# Patient Record
Sex: MALE | Race: WHITE | NOT HISPANIC OR LATINO | Employment: OTHER | URBAN - METROPOLITAN AREA
[De-identification: names, ages, dates, MRNs, and addresses within clinical notes are randomized per-mention and may not be internally consistent; named-entity substitution may affect disease eponyms.]

---

## 2017-05-23 LAB — HBA1C MFR BLD HPLC: 5.6 %

## 2018-02-16 RX ORDER — TAMSULOSIN HYDROCHLORIDE 0.4 MG/1
CAPSULE ORAL
COMMUNITY
End: 2018-03-13 | Stop reason: SDUPTHER

## 2018-02-16 RX ORDER — ACYCLOVIR 200 MG/1
CAPSULE ORAL
COMMUNITY
End: 2019-01-21

## 2018-02-16 RX ORDER — EMPAGLIFLOZIN 10 MG/1
TABLET, FILM COATED ORAL
Status: ON HOLD | COMMUNITY
End: 2018-11-16 | Stop reason: CLARIF

## 2018-02-16 RX ORDER — CHOLECALCIFEROL (VITAMIN D3) 125 MCG
TABLET ORAL
COMMUNITY
End: 2019-01-21

## 2018-02-16 RX ORDER — ALPRAZOLAM 2 MG/1
TABLET ORAL
COMMUNITY
End: 2021-04-14

## 2018-02-16 RX ORDER — CITALOPRAM 10 MG/1
TABLET ORAL
Status: ON HOLD | COMMUNITY
End: 2018-11-16 | Stop reason: CLARIF

## 2018-02-20 ENCOUNTER — OFFICE VISIT (OUTPATIENT)
Dept: UROLOGY | Facility: CLINIC | Age: 63
End: 2018-02-20
Payer: MEDICARE

## 2018-02-20 VITALS
SYSTOLIC BLOOD PRESSURE: 108 MMHG | DIASTOLIC BLOOD PRESSURE: 80 MMHG | WEIGHT: 209 LBS | HEART RATE: 74 BPM | BODY MASS INDEX: 30.96 KG/M2 | HEIGHT: 69 IN

## 2018-02-20 DIAGNOSIS — N52.9 VASCULOGENIC ERECTILE DYSFUNCTION, UNSPECIFIED VASCULOGENIC ERECTILE DYSFUNCTION TYPE: ICD-10-CM

## 2018-02-20 DIAGNOSIS — N40.1 BENIGN PROSTATIC HYPERPLASIA WITH LOWER URINARY TRACT SYMPTOMS, SYMPTOM DETAILS UNSPECIFIED: Primary | ICD-10-CM

## 2018-02-20 LAB
SL AMB  POCT GLUCOSE, UA: NORMAL
SL AMB LEUKOCYTE ESTERASE,UA: NORMAL
SL AMB POCT BILIRUBIN,UA: NORMAL
SL AMB POCT BLOOD,UA: NORMAL
SL AMB POCT CLARITY,UA: CLEAR
SL AMB POCT COLOR,UA: YELLOW
SL AMB POCT KETONES,UA: NORMAL
SL AMB POCT NITRITE,UA: NORMAL
SL AMB POCT PH,UA: 5
SL AMB POCT SPECIFIC GRAVITY,UA: 1.01
SL AMB POCT URINE PROTEIN: NORMAL
SL AMB POCT UROBILINOGEN: NORMAL

## 2018-02-20 PROCEDURE — 81002 URINALYSIS NONAUTO W/O SCOPE: CPT | Performed by: UROLOGY

## 2018-02-20 PROCEDURE — 51741 ELECTRO-UROFLOWMETRY FIRST: CPT | Performed by: UROLOGY

## 2018-02-20 PROCEDURE — 99214 OFFICE O/P EST MOD 30 MIN: CPT | Performed by: UROLOGY

## 2018-02-20 PROCEDURE — 51798 US URINE CAPACITY MEASURE: CPT | Performed by: UROLOGY

## 2018-02-20 RX ORDER — COLESEVELAM 180 1/1
1875 TABLET ORAL 2 TIMES DAILY WITH MEALS
Status: ON HOLD | COMMUNITY
End: 2018-11-16 | Stop reason: CLARIF

## 2018-02-20 RX ORDER — ASPIRIN 81 MG/1
81 TABLET ORAL DAILY
COMMUNITY

## 2018-02-20 RX ORDER — LANOLIN ALCOHOL/MO/W.PET/CERES
CREAM (GRAM) TOPICAL DAILY
COMMUNITY

## 2018-02-20 RX ORDER — MELATONIN
1000 DAILY
COMMUNITY

## 2018-02-20 RX ORDER — OMEPRAZOLE 20 MG/1
20 CAPSULE, DELAYED RELEASE ORAL DAILY
COMMUNITY
End: 2022-03-16 | Stop reason: DRUGHIGH

## 2018-02-20 RX ORDER — GLIMEPIRIDE 1 MG/1
1 TABLET ORAL
COMMUNITY

## 2018-02-20 RX ORDER — ROSUVASTATIN CALCIUM 5 MG/1
5 TABLET, COATED ORAL DAILY
COMMUNITY

## 2018-02-20 NOTE — LETTER
February 20, 2018     Codey Perry MD  [address] [place] [address]    Patient: Jesse Whipple   YOB: 1955   Date of Visit: 2/20/2018       Dear Dr Nuris Maddox: Thank you for referring Jesse Whipple to me for evaluation  Below are my notes for this consultation  If you have questions, please do not hesitate to call me  I look forward to following your patient along with you  Sincerely,        Taz Alford MD        CC: No Recipients  Taz Alford MD  2/20/2018  8:23 AM  Sign at close encounter  Uroflow Result    Indication:    BPH      Procedure  [facility]  There asked to void volitionally into the uroflow apparatus  The following findings were noted:    Findings:  Voided Volume:   60  Maximum flow (Qmax): For ml/s  Average flow:    2 4 ml/s  Description of emptying curve:   diminutive and flattened due to low emptied volume       Post Void Residual Measurement:  After voiding to completion the patient was brought to the exam room and positioned supine  Residual urine volume was measured with an ultrasound at: Thirty ml        Taz Alford MD  2/20/2018  8:22 AM  Sign at close encounter     Diagnoses and all orders for this visit:    Benign prostatic hyperplasia with lower urinary tract symptoms, symptom details unspecified    Vasculogenic erectile dysfunction, unspecified vasculogenic erectile dysfunction type    Other orders  -     acyclovir (ZOVIRAX) 200 mg capsule; Take by mouth  -     ALPRAZolam (XANAX) 2 MG tablet; Take by mouth  -     citalopram (CeleXA) 10 mg tablet; Take by mouth  -     tamsulosin (FLOMAX) 0 4 mg; Take by mouth  -     sitaGLIPtin-metFORMIN (JANUMET)  MG per tablet; Take by mouth  -     Empagliflozin (JARDIANCE) 10 MG TABS; Take by mouth  -     choline fenofibrate (TRILIPIX) 135 MG capsule;  Take by mouth  -     Ergocalciferol (VITAMIN D2) 2000 units TABS; Take by mouth            Assessment and plan:     BPH doing well on tamsulosin    Erectile dysfunction, worsening      Today, we had a long and candid discussion about the multifactorial etiology of erectile dysfunction and the stepwise approach to treatment  I discussed the advantages and disadvantages of the standard treatments for erectile dysfunction including phosphodiesterase inhibitors, intracavernosal injections, urethral suppositories, vacuum erection devices, and penile implants  The patient is interested in trying oral pharmacotherapy to start  I discussed the potential side affects of these medications including, but not limited to flushing, headaches, visual changes, nasal congestion, interactions with other medications, prolonged erections, and failure to achieve an erection  He was warned never to take nitroglycerin in conjunction with medications for erectile dysfunction  I also recommended that he stagger when he takes alpha-blockers for BPH and phosphodiesterase inhibitors  If the patient fails multiple trials of the maximum strength dose, we will further discuss alternative treatment options  All of his questions were answered today and he understands the logic of this approach  I have renewed his tamsulosin  Also prescribed Viagra 100 mg and instructed to start with half tab dosing  We will see him back in 4 months time for symptom reassessment    Raymond Navarrete MD      Chief Complaint     No chief complaint on file  History of Present Illness     Vanesa Momin is a 63y o  year old male with BPH and obstructive lower urinary tract symptoms  He returns for follow-up after more than 1 year  In the interim he has had a heart attack  Overall however he is doing well  His urinary symptoms remain well controlled with tamsulosin monotherapy  He has had worsening erectile dysfunction over the past few months      Detailed Urologic History     - please refer to HPI    Review of Systems     Review of Systems   Constitutional: Negative for activity change and fatigue  HENT: Negative for congestion  Eyes: Negative for visual disturbance  Respiratory: Negative for shortness of breath and wheezing  Cardiovascular: Negative for chest pain and leg swelling  Gastrointestinal: Negative for abdominal pain  Endocrine: Positive for polyuria  Genitourinary: Negative for dysuria, flank pain, hematuria and urgency  Musculoskeletal: Negative for back pain  Allergic/Immunologic: Negative for immunocompromised state  Neurological: Negative for dizziness and numbness  Psychiatric/Behavioral: Negative for dysphoric mood  All other systems reviewed and are negative  Allergies     Allergies   Allergen Reactions    Choline Fenofibrate      Other reaction(s): jaundice    Iodine Solution  [Povidone Iodine]     Shellfish Allergy     Statins        Physical Exam     Physical Exam   Constitutional: He is oriented to person, place, and time  He appears well-developed and well-nourished  No distress  HENT:   Head: Normocephalic and atraumatic  Eyes: EOM are normal    Neck: Normal range of motion  Cardiovascular:   Negative lower extremity edema   Pulmonary/Chest: Effort normal and breath sounds normal    Abdominal: Soft  Genitourinary:   Genitourinary Comments: Negative suprapubic tenderness   Musculoskeletal: Normal range of motion  Neurological: He is alert and oriented to person, place, and time  Skin: Skin is warm  Psychiatric: He has a normal mood and affect  His behavior is normal            Vital Signs  There were no vitals filed for this visit        Current Medications       Current Outpatient Prescriptions:     acyclovir (ZOVIRAX) 200 mg capsule, Take by mouth, Disp: , Rfl:     ALPRAZolam (XANAX) 2 MG tablet, Take by mouth, Disp: , Rfl:     choline fenofibrate (TRILIPIX) 135 MG capsule, Take by mouth, Disp: , Rfl:    citalopram (CeleXA) 10 mg tablet, Take by mouth, Disp: , Rfl:     Empagliflozin (JARDIANCE) 10 MG TABS, Take by mouth, Disp: , Rfl:     Ergocalciferol (VITAMIN D2) 2000 units TABS, Take by mouth, Disp: , Rfl:     sitaGLIPtin-metFORMIN (JANUMET)  MG per tablet, Take by mouth, Disp: , Rfl:     tamsulosin (FLOMAX) 0 4 mg, Take by mouth, Disp: , Rfl:       Active Problems     Patient Active Problem List   Diagnosis    Benign prostatic hyperplasia    ED (erectile dysfunction)         Past Medical History     Past Medical History:   Diagnosis Date    Anxiety     Back problem     [facility]     History of depression     Multiple myeloma ([facility] )          Surgical History     No past surgical history on file        Family History     Family History   Problem Relation Age of Onset    Heart disease Father     Diabetes Brother          Social History     Social History     History   Smoking Status    Former Smoker   Smokeless Tobacco    Not on file         Pertinent Lab Values     No results found for: CREATININE    No results found for: PSA    @RESULTRCNT(1H])@      Pertinent Imaging      - n/a

## 2018-02-20 NOTE — PROGRESS NOTES
Diagnoses and all orders for this visit:    Benign prostatic hyperplasia with lower urinary tract symptoms, symptom details unspecified    Vasculogenic erectile dysfunction, unspecified vasculogenic erectile dysfunction type    Other orders  -     acyclovir (ZOVIRAX) 200 mg capsule; Take by mouth  -     ALPRAZolam (XANAX) 2 MG tablet; Take by mouth  -     citalopram (CeleXA) 10 mg tablet; Take by mouth  -     tamsulosin (FLOMAX) 0 4 mg; Take by mouth  -     sitaGLIPtin-metFORMIN (JANUMET)  MG per tablet; Take by mouth  -     Empagliflozin (JARDIANCE) 10 MG TABS; Take by mouth  -     choline fenofibrate (TRILIPIX) 135 MG capsule; Take by mouth  -     Ergocalciferol (VITAMIN D2) 2000 units TABS; Take by mouth            Assessment and plan:     BPH doing well on tamsulosin    Erectile dysfunction, worsening      Today, we had a long and candid discussion about the multifactorial etiology of erectile dysfunction and the stepwise approach to treatment  I discussed the advantages and disadvantages of the standard treatments for erectile dysfunction including phosphodiesterase inhibitors, intracavernosal injections, urethral suppositories, vacuum erection devices, and penile implants  The patient is interested in trying oral pharmacotherapy to start  I discussed the potential side affects of these medications including, but not limited to flushing, headaches, visual changes, nasal congestion, interactions with other medications, prolonged erections, and failure to achieve an erection  He was warned never to take nitroglycerin in conjunction with medications for erectile dysfunction  I also recommended that he stagger when he takes alpha-blockers for BPH and phosphodiesterase inhibitors  If the patient fails multiple trials of the maximum strength dose, we will further discuss alternative treatment options  All of his questions were answered today and he understands the logic of this approach  I have renewed his tamsulosin  Also prescribed Viagra 100 mg and instructed to start with half tab dosing  We will see him back in 4 months time for symptom reassessment    Verner Squires, MD      Chief Complaint     No chief complaint on file  History of Present Illness     Cena Leventhal is a 63y o  year old male with BPH and obstructive lower urinary tract symptoms  He returns for follow-up after more than 1 year  In the interim he has had a heart attack  Overall however he is doing well  His urinary symptoms remain well controlled with tamsulosin monotherapy  He has had worsening erectile dysfunction over the past few months  Detailed Urologic History     - please refer to HPI    Review of Systems     Review of Systems   Constitutional: Negative for activity change and fatigue  HENT: Negative for congestion  Eyes: Negative for visual disturbance  Respiratory: Negative for shortness of breath and wheezing  Cardiovascular: Negative for chest pain and leg swelling  Gastrointestinal: Negative for abdominal pain  Endocrine: Positive for polyuria  Genitourinary: Negative for dysuria, flank pain, hematuria and urgency  Musculoskeletal: Negative for back pain  Allergic/Immunologic: Negative for immunocompromised state  Neurological: Negative for dizziness and numbness  Psychiatric/Behavioral: Negative for dysphoric mood  All other systems reviewed and are negative  Allergies     Allergies   Allergen Reactions    Choline Fenofibrate      Other reaction(s): jaundice    Iodine Solution  [Povidone Iodine]     Shellfish Allergy     Statins        Physical Exam     Physical Exam   Constitutional: He is oriented to person, place, and time  He appears well-developed and well-nourished  No distress  HENT:   Head: Normocephalic and atraumatic  Eyes: EOM are normal    Neck: Normal range of motion     Cardiovascular:   Negative lower extremity edema Pulmonary/Chest: Effort normal and breath sounds normal    Abdominal: Soft  Genitourinary:   Genitourinary Comments: Negative suprapubic tenderness   Musculoskeletal: Normal range of motion  Neurological: He is alert and oriented to person, place, and time  Skin: Skin is warm  Psychiatric: He has a normal mood and affect  His behavior is normal            Vital Signs  There were no vitals filed for this visit  Current Medications       Current Outpatient Prescriptions:     acyclovir (ZOVIRAX) 200 mg capsule, Take by mouth, Disp: , Rfl:     ALPRAZolam (XANAX) 2 MG tablet, Take by mouth, Disp: , Rfl:     choline fenofibrate (TRILIPIX) 135 MG capsule, Take by mouth, Disp: , Rfl:     citalopram (CeleXA) 10 mg tablet, Take by mouth, Disp: , Rfl:     Empagliflozin (JARDIANCE) 10 MG TABS, Take by mouth, Disp: , Rfl:     Ergocalciferol (VITAMIN D2) 2000 units TABS, Take by mouth, Disp: , Rfl:     sitaGLIPtin-metFORMIN (JANUMET)  MG per tablet, Take by mouth, Disp: , Rfl:     tamsulosin (FLOMAX) 0 4 mg, Take by mouth, Disp: , Rfl:       Active Problems     Patient Active Problem List   Diagnosis    Benign prostatic hyperplasia    ED (erectile dysfunction)         Past Medical History     Past Medical History:   Diagnosis Date    Anxiety     Back problem     [facility]     History of depression     [facility] myeloma ([facility] )          Surgical History     No past surgical history on file        Family History     Family History   Problem Relation Age of Onset    Heart disease Father     Diabetes Brother          Social History     Social History     History   Smoking Status    Former Smoker   Smokeless Tobacco    Not on file         Pertinent Lab Values     No results found for: CREATININE    No results found for: PSA    @RESULTRCNT(1H])@      Pertinent Imaging      - n/a

## 2018-02-20 NOTE — PATIENT INSTRUCTIONS
BLADDER HEALTH    WHAT IS CONSIDERED NORMAL?  The average bladder can hold about 2 cups of urine before it needs to be emptied   The normal range of voiding urine is 6 to 8 times during a 24 hour period  As we get older, our bladder capacity can get smaller and we may need to pass urine more frequently but usually not more than every 2 hours   Urine should flow easily without discomfort in a good, steady stream until the bladder is empty  No pushing or straining is necessary to empty the bladder   An urge is a signal that you feel as the bladder stretches to fill with urine  Urges can be felt even if the bladder is not full  Urges are not commands to go to the toilet, merely a signal and can be controlled  WHAT ARE GOOD BLADDER HABITS?  Take your time when emptying your bladder  Dont strain or push to empty your bladder  Make sure you empty your bladder completely each time you pass urine  Do not rush the process   Consistently ignoring the urge to go (waiting more than 4 hours between toileting) or urinating too infrequently may be convenient but not healthy for your bladder   Avoid going to the toilet just in case or more often than every 2 hours  It is usually not necessary to go when you feel the first urge  Try to go only when your bladder is full  Urgency and frequency of urination can be improved by retraining the bladder and spacing your fluid intake throughout the day  Practice good toilet habits  Dont let your bladder control your life  TIPS TO MAINTAIN GOOD BLADDER HABITS   Maintain a good fluid intake  Depending on your body size and environment, drink 6 -8 cups (8 oz each) of fluid per day unless otherwise advised by your doctor  Not enough fluid creates a foul odor and dark color of the urine     Limit the amount of caffeine (coffee, cola, chocolate or tea) and citrus foods that you consume as these foods can be associated with increased sensation of urinary urgency and frequency   Limit the amount of alcohol you drink  Alcohol increases urine production and makes it difficult for the brain to coordinate bladder control   Avoid constipation by maintaining a balanced diet of dietary fiber   Cigarette smoking is also irritating to the bladder surface and is associated with bladder cancer  In addition, the coughing associated with smoking may lead to increased incontinent episodes because of the increased pressure  HOW DIET CAN AFFECT YOUR BLADDER  Although there is no particular "diet" that can cure bladder control, there are certain dietary suggestions you can use to help control the problem  There are 2 points to consider when evaluating how your habits and diet may affect your bladder:    1  Foods and fluids can irritate the bladder  Some foods and beverages are thought to contribute to bladder leakage and irritability  However their effect on the bladder is not completely understood and you may want to see if eliminating one or all of these items improves your bladder control  If you are unable to give them up completely, it is recommended that you use the following items in moderation:   Acidic beverages and foods (orange juice, grapefruit juice, lemonade etc)   Alcoholic beverages   Vinegar   Coffee (regular and decaf)   Tea (regular and decaf)   Caffeinated beverages   Carbonated beverages          2  Drinking enough and the right kinds of fluids  Many people with bladder control issues decrease their intake of liquids in hope that they will need to urinate less frequently or have less urinary leakage   You should not restrict fluids to control your bladder  While a decrease in liquid intake does result in a decrease in the volume of urine, the smaller amount of urine may be more highly concentrated         Highly concentrated, dark yellow urine is irritating to the bladder surface and may actually cause you to go to the bathroom more frequently   It also encourages the growth of bacteria, which may lead to infections resulting in incontinence   Substitutions for Bladder Irritants: water is always the best beverage choice    Grape and apple juice are thirst quenchers are good selections and are not as irritating to the bladder   o Low acid fruits:  Pears, apricots, papaya, watermelon  o For coffee drinkers: KAVA®, Postum®, Kevin®, Kaffree Yaritza®  o For tea drinkers:  non-citrus or herbal and sun brewed tea

## 2018-02-20 NOTE — PROGRESS NOTES
Uroflow Result    Indication:    BPH      Procedure  The patient was brought ambulatory to the commode and instructions provided  There asked to void volitionally into the uroflow apparatus  The following findings were noted:    Findings:  Voided Volume:   60  Maximum flow (Qmax): For ml/s  Average flow:    2 4 ml/s  Description of emptying curve:   diminutive and flattened due to low emptied volume       Post Void Residual Measurement:  After voiding to completion the patient was brought to the exam room and positioned supine  Residual urine volume was measured with an ultrasound at:     Thirty ml

## 2018-03-13 DIAGNOSIS — N40.0 BENIGN PROSTATIC HYPERPLASIA, UNSPECIFIED WHETHER LOWER URINARY TRACT SYMPTOMS PRESENT: Primary | ICD-10-CM

## 2018-03-13 DIAGNOSIS — N52.9 ERECTILE DYSFUNCTION, UNSPECIFIED ERECTILE DYSFUNCTION TYPE: ICD-10-CM

## 2018-03-13 RX ORDER — TAMSULOSIN HYDROCHLORIDE 0.4 MG/1
0.4 CAPSULE ORAL
Qty: 30 CAPSULE | Refills: 6 | Status: SHIPPED | OUTPATIENT
Start: 2018-03-13 | End: 2018-06-21 | Stop reason: SDUPTHER

## 2018-03-13 RX ORDER — SILDENAFIL 50 MG/1
50 TABLET, FILM COATED ORAL DAILY PRN
Qty: 10 TABLET | Refills: 6 | Status: SHIPPED | OUTPATIENT
Start: 2018-03-13 | End: 2019-11-19 | Stop reason: DRUGHIGH

## 2018-03-13 NOTE — TELEPHONE ENCOUNTER
[facility] patient,  Spoke to patient, recently seen in follow up on 2/20/18 with Dr Flavia Villegas, patient states his prescriptions for Flomax and Viagra were not sent to [facility] on [address]  Advised patient will send message to advanced practitioner and request refills be sent  Patient requests we call him once scripts are sent  Call back number [phone]

## 2018-06-21 ENCOUNTER — OFFICE VISIT (OUTPATIENT)
Dept: UROLOGY | Facility: CLINIC | Age: 63
End: 2018-06-21
Payer: MEDICARE

## 2018-06-21 VITALS
WEIGHT: 215 LBS | DIASTOLIC BLOOD PRESSURE: 80 MMHG | BODY MASS INDEX: 31.84 KG/M2 | HEART RATE: 74 BPM | SYSTOLIC BLOOD PRESSURE: 114 MMHG | HEIGHT: 69 IN

## 2018-06-21 DIAGNOSIS — Z12.5 PROSTATE CANCER SCREENING: Primary | ICD-10-CM

## 2018-06-21 DIAGNOSIS — N40.0 BENIGN PROSTATIC HYPERPLASIA, UNSPECIFIED WHETHER LOWER URINARY TRACT SYMPTOMS PRESENT: ICD-10-CM

## 2018-06-21 PROCEDURE — 99213 OFFICE O/P EST LOW 20 MIN: CPT | Performed by: PHYSICIAN ASSISTANT

## 2018-06-21 RX ORDER — TAMSULOSIN HYDROCHLORIDE 0.4 MG/1
0.4 CAPSULE ORAL
Qty: 90 CAPSULE | Refills: 3 | Status: SHIPPED | OUTPATIENT
Start: 2018-06-21 | End: 2019-05-23 | Stop reason: SDUPTHER

## 2018-06-21 RX ORDER — ACYCLOVIR 400 MG/1
400 TABLET ORAL DAILY
Refills: 5 | COMMUNITY
Start: 2018-06-09

## 2018-06-21 NOTE — PROGRESS NOTES
1  Prostate cancer screening  PSA Total, Diagnostic   2  Benign prostatic hyperplasia, unspecified whether lower urinary tract symptoms present  tamsulosin (FLOMAX) 0 4 mg         Assessment and plan:       1  BPH with lower urinary tract symptoms -managed by Dr Felipe Husain  - symptoms adequately managed on tamsulosin daily at this time  - refills provided    2  Erectile dysfunction  - patient is having mild improvement with Viagra 50 mg  He has not tried maximum dose management however yet  Patient denies any side effects from the medication     -we discussed other treatment options including daily Cialis, intraurethral suppositories, intracavernosal injections, vacuum assist devices, and penile prostheses  -we discussed trying maximum strength Viagra 100 mg  This will be in the form of Sildenfil 20 mg 5 tablets as needed since Viagra was cost prohibitive   -side effect profile reviewed  Patient is aware that he can never take this medication in conjunction with nitroglycerin  -patient will follow up in the next 2-3 months with a PSA prior to visit for re-evaluation  Felicia Garcia PA-C      Chief Complaint     Chief Complaint   Patient presents with    Benign Prostatic Hypertrophy         History of Present Illness     Nuzhat Rea is a 63 y o  male patient of Dr Jamilah Ortiz with a history of BPH with obstructive lower urinary tract symptoms presenting for follow-up  Patient is BPH with lower urinary tract symptoms have been managed on tamsulosin daily  N/C and post micturition dribbling  He denies any urinary incontinence, gross hematuria, dysuria, suprapubic pressure  He denies any urinary tract infections  Patient at his last appointment had been noting worsening erectile function  He was initiated on Viagra 100 mg was instructed to did start with a half tab dose  Patient had taken Viagra 50 mg without any side effects    He states he is able to only achieve a partial erection with this medication, however this is better than without medication where he is unable to achieve erection at all  Patient states that Viagra 50 mg was cost prohibitive as well  Patient denies any family history of prostate cancer    Laboratory       Review of Systems     Review of Systems   Constitutional: Negative for activity change, appetite change, chills, diaphoresis, fatigue, fever and unexpected weight change  Respiratory: Negative for chest tightness and shortness of breath  Cardiovascular: Negative for chest pain, palpitations and leg swelling  Gastrointestinal: Negative for abdominal distention, abdominal pain, constipation, diarrhea, nausea and vomiting  Genitourinary: Negative for decreased urine volume, difficulty urinating, dysuria, enuresis, flank pain, frequency, genital sores, hematuria and urgency  Musculoskeletal: Negative for back pain, gait problem and myalgias  Skin: Negative for color change, pallor, rash and wound  Psychiatric/Behavioral: Negative for behavioral problems  The patient is not nervous/anxious  Allergies     Allergies   Allergen Reactions    Iodinated Diagnostic Agents Anaphylaxis    Iodine Anaphylaxis    Atorvastatin      Other reaction(s): Other (See Comments)    Choline Fenofibrate      Other reaction(s): jaundice  Other reaction(s): jaundice    Iodine Solution  [Povidone Iodine]     Shellfish Allergy     Statins        Physical Exam     Physical Exam   Constitutional: He is oriented to person, place, and time  He appears well-developed and well-nourished  No distress  HENT:   Head: Normocephalic and atraumatic  Eyes: Conjunctivae are normal    Neck: Normal range of motion  No tracheal deviation present  Pulmonary/Chest: Effort normal    Musculoskeletal: Normal range of motion  He exhibits no edema or deformity  Neurological: He is alert and oriented to person, place, and time  Skin: Skin is warm and dry  No rash noted   He is not diaphoretic  No erythema  Psychiatric: He has a normal mood and affect   His behavior is normal          Vital Signs     Vitals:    06/21/18 0939   BP: 114/80   BP Location: Left arm   Patient Position: Sitting   Cuff Size: Adult   Pulse: 74   Weight: 97 5 kg (215 lb)   Height: 5' 9" (1 753 m)         Current Medications       Current Outpatient Prescriptions:     acyclovir (ZOVIRAX) 200 mg capsule, Take by mouth, Disp: , Rfl:     ALPRAZolam (XANAX) 2 MG tablet, Take by mouth, Disp: , Rfl:     aspirin (ECOTRIN LOW STRENGTH) 81 mg EC tablet, Take 81 mg by mouth daily, Disp: , Rfl:     cholecalciferol (VITAMIN D3) 1,000 units tablet, Take 1,000 Units by mouth daily, Disp: , Rfl:     choline fenofibrate (TRILIPIX) 135 MG capsule, Take by mouth, Disp: , Rfl:     citalopram (CeleXA) 10 mg tablet, Take by mouth, Disp: , Rfl:     colesevelam (WELCHOL) 625 mg tablet, Take 1,875 mg by mouth 2 (two) times a day with meals, Disp: , Rfl:     cyanocobalamin (VITAMIN B-12) 1,000 mcg tablet, Take by mouth daily, Disp: , Rfl:     Empagliflozin (JARDIANCE) 10 MG TABS, Take by mouth, Disp: , Rfl:     Ergocalciferol (VITAMIN D2) 2000 units TABS, Take by mouth, Disp: , Rfl:     glimepiride (AMARYL) 1 mg tablet, Take 1 mg by mouth every morning before breakfast, Disp: , Rfl:     metoprolol tartrate (LOPRESSOR) 25 mg tablet, Take 25 mg by mouth every 12 (twelve) hours, Disp: , Rfl:     omeprazole (PriLOSEC) 20 mg delayed release capsule, Take 20 mg by mouth daily, Disp: , Rfl:     rosuvastatin (CRESTOR) 5 mg tablet, Take 5 mg by mouth daily, Disp: , Rfl:     sildenafil (VIAGRA) 50 MG tablet, Take 1 tablet (50 mg total) by mouth daily as needed for erectile dysfunction, Disp: 10 tablet, Rfl: 6    sitaGLIPtin-metFORMIN (JANUMET)  MG per tablet, Take by mouth, Disp: , Rfl:     tamsulosin (FLOMAX) 0 4 mg, Take 1 capsule (0 4 mg total) by mouth daily with dinner, Disp: 90 capsule, Rfl: 3    acyclovir (ZOVIRAX) 400 MG tablet, Take 400 mg by mouth daily, Disp: , Rfl: 5      Active Problems     Patient Active Problem List   Diagnosis    Benign prostatic hyperplasia    ED (erectile dysfunction)         Past Medical History     Past Medical History:   Diagnosis Date    Anxiety     Back problem     [facility] ([facility] )     History of depression     Multiple myeloma ([facility] )          Surgical History     History reviewed  No pertinent surgical history        Family History     Family History   Problem Relation Age of Onset    Heart disease Father     Diabetes Brother          Social History     Social History       Radiology

## 2018-08-09 ENCOUNTER — TELEPHONE (OUTPATIENT)
Dept: GASTROENTEROLOGY | Facility: CLINIC | Age: 63
End: 2018-08-09

## 2018-08-14 ENCOUNTER — TELEPHONE (OUTPATIENT)
Dept: GASTROENTEROLOGY | Facility: CLINIC | Age: 63
End: 2018-08-14

## 2018-10-17 RX ORDER — TRAZODONE HYDROCHLORIDE 50 MG/1
50 TABLET ORAL
Refills: 1 | COMMUNITY
Start: 2018-08-28 | End: 2021-05-25

## 2018-10-17 RX ORDER — LOSARTAN POTASSIUM 25 MG/1
25 TABLET ORAL DAILY
Refills: 1 | COMMUNITY
Start: 2018-09-09 | End: 2022-03-16 | Stop reason: DRUGHIGH

## 2018-10-17 RX ORDER — KETOCONAZOLE 20 MG/G
CREAM TOPICAL
Refills: 2 | COMMUNITY
Start: 2018-08-01 | End: 2020-10-06

## 2018-10-17 RX ORDER — CHLORHEXIDINE GLUCONATE 0.12 MG/ML
RINSE ORAL
Refills: 2 | COMMUNITY
Start: 2018-07-31 | End: 2021-04-14

## 2018-10-17 RX ORDER — BUPROPION HYDROCHLORIDE 100 MG/1
300 TABLET, EXTENDED RELEASE ORAL DAILY
Refills: 4 | COMMUNITY
Start: 2018-10-10 | End: 2019-01-21

## 2018-10-18 ENCOUNTER — OFFICE VISIT (OUTPATIENT)
Dept: GASTROENTEROLOGY | Facility: CLINIC | Age: 63
End: 2018-10-18
Payer: MEDICARE

## 2018-10-18 VITALS
HEART RATE: 72 BPM | WEIGHT: 214 LBS | BODY MASS INDEX: 31.6 KG/M2 | DIASTOLIC BLOOD PRESSURE: 74 MMHG | SYSTOLIC BLOOD PRESSURE: 130 MMHG

## 2018-10-18 DIAGNOSIS — K59.1 FUNCTIONAL DIARRHEA: ICD-10-CM

## 2018-10-18 DIAGNOSIS — Z12.11 SCREENING FOR COLON CANCER: Primary | ICD-10-CM

## 2018-10-18 PROBLEM — R19.7 DIARRHEA: Status: ACTIVE | Noted: 2018-10-18

## 2018-10-18 PROCEDURE — 99204 OFFICE O/P NEW MOD 45 MIN: CPT | Performed by: INTERNAL MEDICINE

## 2018-10-18 RX ORDER — ZOLEDRONIC ACID 4 MG/100ML
4 INJECTION, SOLUTION, CONCENTRATE INTRAVENOUS ONCE
COMMUNITY
End: 2021-04-14

## 2018-10-18 RX ORDER — LENALIDOMIDE 5 MG/1
5 CAPSULE ORAL DAILY
COMMUNITY

## 2018-10-18 NOTE — PROGRESS NOTES
Consultation - [facility]  Markiah Sauls 1955 66 y o  male     ASSESSMENT @ PLAN:   He is a 61-year-old male that needs a colonoscopy for colon cancer screening  He has multiple myeloma and is in remission  He is having some diarrhea secondary to his Revlimid therapy  Will do colonoscopy to investigate    Will recommend high-fiber diet    He will be checking his platelet count before the colonoscopy to make sure that it is above 50,000    Chief Complaint:   Diarrhea and colon cancer screening    HPI:   He is a 61-year-old male here for colon cancer screening and diarrhea  He is having some diarrhea from the Revlimid therapy  He has no melena hematochezia  He needs a colonoscopy  He is in remission for multiple myeloma for about a year  He has no fevers chills nausea vomiting  Nobody in the family has Crohn's disease or ulcerative colitis  His last colonoscopy was in 2013 he had benign polyps  REVIEW OF SYSTEMS:     CONSTITUTIONAL: Denies any fever, chills, or rigors  Good appetite, and no recent weight loss  HEENT: No earache or tinnitus  Denies hearing loss or visual disturbances  CARDIOVASCULAR: No chest pain or palpitations  RESPIRATORY: Denies any cough, hemoptysis, shortness of breath or dyspnea on exertion  GASTROINTESTINAL: As noted in the History of Present Illness  GENITOURINARY: No problems with urination  Denies any hematuria or dysuria  NEUROLOGIC: No dizziness or vertigo, denies headaches  MUSCULOSKELETAL: Denies any muscle or joint pain  SKIN: Denies skin rashes or itching  ENDOCRINE: Denies excessive thirst  Denies intolerance to heat or cold  PSYCHOSOCIAL: Denies depression or anxiety  Denies any recent memory loss       Past Medical History:   Diagnosis Date    Anxiety     Back problem     BPH (benign prostatic hyperplasia)     [facility]     History of depression     Multiple myeloma (HCC)     [facility] )       Past Surgical History:   Procedure Laterality Date    HERNIA REPAIR      TONSILLECTOMY       Social History     Social History    Marital status: /Civil Union     Spouse name: N/A    Number of children: N/A    Years of education: N/A     Occupational History    Not on file  Social History Main Topics    Smoking status: Former Smoker    Smokeless tobacco: Never Used    Alcohol use No    Drug use: No    Sexual activity: Not on file     Other Topics Concern    Not on file     Social History Narrative    No narrative on file     Family History   Problem Relation Age of Onset    Heart disease Father     Diabetes Brother      Iodinated diagnostic agents; Iodine; Trilipix [choline fenofibrate]; Atorvastatin; Iodine solution [povidone iodine];  Shellfish allergy; and Statins  Current Outpatient Prescriptions   Medication Sig Dispense Refill    acyclovir (ZOVIRAX) 400 MG tablet Take 400 mg by mouth daily  5    ALPRAZolam (XANAX) 2 MG tablet Take by mouth      aspirin (ECOTRIN LOW STRENGTH) 81 mg EC tablet Take 81 mg by mouth daily      cholecalciferol (VITAMIN D3) 1,000 units tablet Take 1,000 Units by mouth daily      glimepiride (AMARYL) 1 mg tablet Take 1 mg by mouth every morning before breakfast      ketoconazole (NIZORAL) 2 % cream APPLY DAILY BETWEEN ALL TOES  2    lenalidomide (REVLIMID) 5 MG CAPS Take 5 mg by mouth daily      losartan (COZAAR) 25 mg tablet Take 25 mg by mouth daily  1    metoprolol tartrate (LOPRESSOR) 25 mg tablet Take 25 mg by mouth every 12 (twelve) hours      omeprazole (PriLOSEC) 20 mg delayed release capsule Take 20 mg by mouth daily      rosuvastatin (CRESTOR) 5 mg tablet Take 5 mg by mouth daily      sildenafil (VIAGRA) 50 MG tablet Take 1 tablet (50 mg total) by mouth daily as needed for erectile dysfunction 10 tablet 6    sitaGLIPtin-metFORMIN (JANUMET)  MG per tablet Take by mouth      tamsulosin (FLOMAX) 0 4 mg Take 1 capsule (0 4 mg total) by mouth daily with dinner 90 capsule 3    traZODone (DESYREL) 50 mg tablet 50 mg daily at bedtime as needed for sleep  1    zoledronic acid (ZOMETA) 4 mg/100 mL Infuse 4 mg into a venous catheter once      acyclovir (ZOVIRAX) 200 mg capsule Take by mouth      buPROPion (WELLBUTRIN SR) 100 mg 12 hr tablet Take 300 mg by mouth daily  4    chlorhexidine (PERIDEX) 0 12 % solution RINSE FOR 60 SECONDS AND EXPECTORATE TWICE DAILY  2    choline fenofibrate (TRILIPIX) 135 MG capsule Take by mouth      citalopram (CeleXA) 10 mg tablet Take by mouth      colesevelam (WELCHOL) 625 mg tablet Take 1,875 mg by mouth 2 (two) times a day with meals      cyanocobalamin (VITAMIN B-12) 1,000 mcg tablet Take by mouth daily      Empagliflozin (JARDIANCE) 10 MG TABS Take by mouth      Ergocalciferol (VITAMIN D2) 2000 units TABS Take by mouth       No current facility-administered medications for this visit  Blood pressure 130/74, pulse 72, weight 97 1 kg (214 lb)  PHYSICAL EXAM:     General Appearance:   Alert, cooperative, no distress, appears stated age    HEENT:   Normocephalic, atraumatic, anicteric      Neck:  Supple, symmetrical, trachea midline, no adenopathy;    thyroid: no enlargement/tenderness/nodules; no carotid  bruit or JVD    Lungs:   Clear to auscultation bilaterally; no rales, rhonchi or wheezing; respirations unlabored    Heart[de-identified]   S1 and S2 normal; regular rate and rhythm; no murmur, rub, or gallop     Abdomen:   Soft, non-tender, non-distended; normal bowel sounds; no masses, no organomegaly    Genitalia:   Deferred    Rectal:   Deferred    Extremities:  No cyanosis, clubbing or edema    Pulses:  2+ and symmetric all extremities    Skin:  Skin color, texture, turgor normal, no rashes or lesions    Lymph nodes:  No palpable cervical, axillary or inguinal lymphadenopathy        No results found for: WBC, HGB, HCT, MCV, PLT  No results found for: GLUCOSE, CALCIUM, NA, K, CO2, CL, BUN, CREATININE  No results found for: ALT, AST, GGT, ALKPHOS, BILITOT  No results found for: INR, PROTIME

## 2018-11-14 ENCOUNTER — TELEPHONE (OUTPATIENT)
Dept: UROLOGY | Facility: CLINIC | Age: 63
End: 2018-11-14

## 2018-11-15 ENCOUNTER — ANESTHESIA EVENT (OUTPATIENT)
Dept: PERIOP | Facility: HOSPITAL | Age: 63
End: 2018-11-15
Payer: MEDICARE

## 2018-11-15 ENCOUNTER — OFFICE VISIT (OUTPATIENT)
Dept: UROLOGY | Facility: CLINIC | Age: 63
End: 2018-11-15
Payer: MEDICARE

## 2018-11-15 VITALS
BODY MASS INDEX: 32.17 KG/M2 | DIASTOLIC BLOOD PRESSURE: 70 MMHG | HEIGHT: 69 IN | HEART RATE: 64 BPM | SYSTOLIC BLOOD PRESSURE: 124 MMHG | WEIGHT: 217.2 LBS

## 2018-11-15 DIAGNOSIS — Z12.5 PROSTATE CANCER SCREENING: Primary | ICD-10-CM

## 2018-11-15 DIAGNOSIS — N52.9 ERECTILE DYSFUNCTION, UNSPECIFIED ERECTILE DYSFUNCTION TYPE: ICD-10-CM

## 2018-11-15 PROCEDURE — 99213 OFFICE O/P EST LOW 20 MIN: CPT | Performed by: PHYSICIAN ASSISTANT

## 2018-11-15 RX ORDER — SILDENAFIL CITRATE 20 MG/1
TABLET ORAL
Qty: 50 TABLET | Refills: 6 | Status: ON HOLD | OUTPATIENT
Start: 2018-11-15 | End: 2018-11-16 | Stop reason: CLARIF

## 2018-11-15 NOTE — PROGRESS NOTES
1  Prostate cancer screening  PSA, Total Screen   2  Erectile dysfunction, unspecified erectile dysfunction type  sildenafil (REVATIO) 20 mg tablet     Assessment and plan:       1  BPH with lower urinary tract symptoms -managed by Dr Farrah aZidi  - continue tamsulosin monotherapy    2  Erectile dysfunction  - unable to achieve fully rigid erection with sildenafil 100 mg   - we discussed further intervention with vacuum assisted device, intraurethral suppositories, and intracavernosal injections  Patient does not wish to pursue any of these modalities because he feels like it removed the spontaneity   -  I encouraged patient to increase cardiovascular exercise and have weight loss  - he will continue with sildenafil 100 mg as needed  3  Routine prostate cancer screening  - PSA within normal limits of 0 35  F/u 1 year PSA prior to visit    Christofer Carlson PA-C      Chief Complaint     Chief Complaint   Patient presents with    Benign Prostatic Hypertrophy    prostate cancer screening     PSA=0 35(10/29/2018)       History of Present Illness     Ramiro Moody is a 66 y o   male patient of Dr Farrah Zaidi a history of BPH with obstructive lower urinary tract symptoms presenting for follow-up    Patient continues to take tamsulosin monotherapy  He is overall comfortable with urination  Feels like he has a fair stream, feels empty after urination, has nocturia 2-3 times nightly  Denies any dysuria, gross hematuria, suprapubic pressure, or urinary infections  Patient has had complaints of erectile dysfunction  He most recently had been utilizing sildenafil 100 mg  He states that he was able to achieve approximately a 70% rigidity of the erection, however did not feel that this was sufficient enough for penetration  He denied any adverse reactions to the medication  Patient had a recent PSA is 0 35 (10/29/2018)        Review of Systems     Review of Systems   Constitutional: Negative for activity change, appetite change, chills, diaphoresis, fatigue, fever and unexpected weight change  Respiratory: Negative for chest tightness and shortness of breath  Cardiovascular: Negative for chest pain, palpitations and leg swelling  Gastrointestinal: Negative for abdominal distention, abdominal pain, constipation, diarrhea, nausea and vomiting  Genitourinary: Negative for decreased urine volume, difficulty urinating, dysuria, enuresis, flank pain, frequency, genital sores, hematuria and urgency  Musculoskeletal: Negative for back pain, gait problem and myalgias  Skin: Negative for color change, pallor, rash and wound  Psychiatric/Behavioral: Negative for behavioral problems  The patient is not nervous/anxious  Allergies     Allergies   Allergen Reactions    Iodinated Diagnostic Agents Anaphylaxis    Iodine Anaphylaxis    Trilipix [Choline Fenofibrate]      Other reaction(s): jaundice  Other reaction(s): jaundice    Atorvastatin     Iodine Solution [Povidone Iodine]     Shellfish Allergy     Statins        Physical Exam     Physical Exam   Constitutional: He appears well-developed and well-nourished  No distress  HENT:   Head: Normocephalic and atraumatic  Eyes: Conjunctivae are normal    Neck: Normal range of motion  No tracheal deviation present  Pulmonary/Chest: Effort normal    Musculoskeletal: Normal range of motion  He exhibits no edema or deformity  Neurological: He is alert  Skin: Skin is warm and dry  No rash noted  He is not diaphoretic  No erythema  Psychiatric: He has a normal mood and affect   His behavior is normal          Vital Signs     Vitals:    11/15/18 1103   BP: 124/70   Pulse: 64   Weight: 98 5 kg (217 lb 3 2 oz)   Height: 5' 9" (1 753 m)         Current Medications       Current Outpatient Prescriptions:     acyclovir (ZOVIRAX) 200 mg capsule, Take by mouth, Disp: , Rfl:     acyclovir (ZOVIRAX) 400 MG tablet, Take 400 mg by mouth daily, Disp: , Rfl: 5    ALPRAZolam (XANAX) 2 MG tablet, Take by mouth, Disp: , Rfl:     aspirin (ECOTRIN LOW STRENGTH) 81 mg EC tablet, Take 81 mg by mouth daily, Disp: , Rfl:     buPROPion (WELLBUTRIN SR) 100 mg 12 hr tablet, Take 300 mg by mouth daily, Disp: , Rfl: 4    cholecalciferol (VITAMIN D3) 1,000 units tablet, Take 1,000 Units by mouth daily, Disp: , Rfl:     colesevelam (WELCHOL) 625 mg tablet, Take 1,875 mg by mouth 2 (two) times a day with meals, Disp: , Rfl:     cyanocobalamin (VITAMIN B-12) 1,000 mcg tablet, Take by mouth daily, Disp: , Rfl:     Empagliflozin (JARDIANCE) 10 MG TABS, Take by mouth, Disp: , Rfl:     Ergocalciferol (VITAMIN D2) 2000 units TABS, Take by mouth, Disp: , Rfl:     glimepiride (AMARYL) 1 mg tablet, Take 1 mg by mouth every morning before breakfast, Disp: , Rfl:     ketoconazole (NIZORAL) 2 % cream, APPLY DAILY BETWEEN ALL TOES, Disp: , Rfl: 2    lenalidomide (REVLIMID) 5 MG CAPS, Take 5 mg by mouth daily, Disp: , Rfl:     losartan (COZAAR) 25 mg tablet, Take 25 mg by mouth daily, Disp: , Rfl: 1    metoprolol tartrate (LOPRESSOR) 25 mg tablet, Take 25 mg by mouth every 12 (twelve) hours, Disp: , Rfl:     omeprazole (PriLOSEC) 20 mg delayed release capsule, Take 20 mg by mouth daily, Disp: , Rfl:     rosuvastatin (CRESTOR) 5 mg tablet, Take 5 mg by mouth daily, Disp: , Rfl:     sildenafil (VIAGRA) 50 MG tablet, Take 1 tablet (50 mg total) by mouth daily as needed for erectile dysfunction, Disp: 10 tablet, Rfl: 6    sitaGLIPtin-metFORMIN (JANUMET)  MG per tablet, Take by mouth, Disp: , Rfl:     tamsulosin (FLOMAX) 0 4 mg, Take 1 capsule (0 4 mg total) by mouth daily with dinner, Disp: 90 capsule, Rfl: 3    traZODone (DESYREL) 50 mg tablet, 50 mg daily at bedtime as needed for sleep, Disp: , Rfl: 1    zoledronic acid (ZOMETA) 4 mg/100 mL, Infuse 4 mg into a venous catheter once, Disp: , Rfl:     chlorhexidine (PERIDEX) 0 12 % solution, RINSE FOR 60 SECONDS AND EXPECTORATE TWICE DAILY, Disp: , Rfl: 2    choline fenofibrate (TRILIPIX) 135 MG capsule, Take by mouth, Disp: , Rfl:     citalopram (CeleXA) 10 mg tablet, Take by mouth, Disp: , Rfl:     sildenafil (REVATIO) 20 mg tablet, Please take 5 tablet PO PRN erectile dysufnction, Disp: 50 tablet, Rfl: 6      Active Problems     Patient Active Problem List   Diagnosis    Benign prostatic hyperplasia    ED (erectile dysfunction)    Screening for colon cancer    Diarrhea       Past Medical History     Past Medical History:   Diagnosis Date    Anxiety     Back problem     BPH (benign prostatic hyperplasia)     [facility]     History of depression     [facility]     [facility] myeloma ([facility]          Surgical History     Past Surgical History:   Procedure Laterality Date    HERNIA REPAIR      TONSILLECTOMY         Family History     Family History   Problem Relation Age of Onset    Heart disease Father     Diabetes Brother        Social History     Social History       Radiology

## 2018-11-16 ENCOUNTER — ANESTHESIA (OUTPATIENT)
Dept: PERIOP | Facility: HOSPITAL | Age: 63
End: 2018-11-16
Payer: MEDICARE

## 2018-11-16 ENCOUNTER — HOSPITAL ENCOUNTER (OUTPATIENT)
Facility: HOSPITAL | Age: 63
Setting detail: OUTPATIENT SURGERY
Discharge: HOME | End: 2018-11-16
Attending: INTERNAL MEDICINE | Admitting: INTERNAL MEDICINE
Payer: MEDICARE

## 2018-11-16 VITALS
SYSTOLIC BLOOD PRESSURE: 108 MMHG | HEIGHT: 69 IN | WEIGHT: 211.42 LBS | BODY MASS INDEX: 31.31 KG/M2 | TEMPERATURE: 98.5 F | DIASTOLIC BLOOD PRESSURE: 52 MMHG | OXYGEN SATURATION: 97 % | RESPIRATION RATE: 18 BRPM | HEART RATE: 68 BPM

## 2018-11-16 LAB — GLUCOSE SERPL-MCNC: 167 MG/DL (ref 65–140)

## 2018-11-16 PROCEDURE — G0105 COLORECTAL SCRN; HI RISK IND: HCPCS | Performed by: INTERNAL MEDICINE

## 2018-11-16 PROCEDURE — 82948 REAGENT STRIP/BLOOD GLUCOSE: CPT

## 2018-11-16 RX ORDER — SODIUM CHLORIDE, SODIUM LACTATE, POTASSIUM CHLORIDE, CALCIUM CHLORIDE 600; 310; 30; 20 MG/100ML; MG/100ML; MG/100ML; MG/100ML
50 INJECTION, SOLUTION INTRAVENOUS CONTINUOUS
Status: DISCONTINUED | OUTPATIENT
Start: 2018-11-16 | End: 2018-11-16 | Stop reason: HOSPADM

## 2018-11-16 RX ORDER — SODIUM CHLORIDE, SODIUM LACTATE, POTASSIUM CHLORIDE, CALCIUM CHLORIDE 600; 310; 30; 20 MG/100ML; MG/100ML; MG/100ML; MG/100ML
INJECTION, SOLUTION INTRAVENOUS CONTINUOUS PRN
Status: DISCONTINUED | OUTPATIENT
Start: 2018-11-16 | End: 2018-11-16 | Stop reason: SURG

## 2018-11-16 RX ORDER — PROPOFOL 10 MG/ML
INJECTION, EMULSION INTRAVENOUS AS NEEDED
Status: DISCONTINUED | OUTPATIENT
Start: 2018-11-16 | End: 2018-11-16 | Stop reason: SURG

## 2018-11-16 RX ORDER — SODIUM CHLORIDE, SODIUM LACTATE, POTASSIUM CHLORIDE, CALCIUM CHLORIDE 600; 310; 30; 20 MG/100ML; MG/100ML; MG/100ML; MG/100ML
125 INJECTION, SOLUTION INTRAVENOUS CONTINUOUS
Status: DISCONTINUED | OUTPATIENT
Start: 2018-11-16 | End: 2018-11-16 | Stop reason: HOSPADM

## 2018-11-16 RX ADMIN — PROPOFOL 30 MG: 10 INJECTION, EMULSION INTRAVENOUS at 07:34

## 2018-11-16 RX ADMIN — PROPOFOL 30 MG: 10 INJECTION, EMULSION INTRAVENOUS at 07:31

## 2018-11-16 RX ADMIN — SODIUM CHLORIDE, SODIUM LACTATE, POTASSIUM CHLORIDE, AND CALCIUM CHLORIDE: .6; .31; .03; .02 INJECTION, SOLUTION INTRAVENOUS at 07:26

## 2018-11-16 RX ADMIN — PROPOFOL 90 MG: 10 INJECTION, EMULSION INTRAVENOUS at 07:28

## 2018-11-16 NOTE — ANESTHESIA POSTPROCEDURE EVALUATION
Post-Op Assessment Note      CV Status:  Stable    Mental Status:  Alert and awake    Hydration Status:  Euvolemic    PONV Controlled:  Controlled    Airway Patency:  Patent    Post Op Vitals Reviewed: Yes          Staff: CRNA, Anesthesiologist           BP      Temp     Pulse     Resp      SpO2

## 2018-11-16 NOTE — ANESTHESIA PREPROCEDURE EVALUATION
Review of Systems/Medical History  Patient summary reviewed  Chart reviewed      Cardiovascular  EKG reviewed, Hyperlipidemia, Hypertension , Past MI > 6 months, Cardiac stents > 6 months    Pulmonary  Shortness of breath,        GI/Hepatic            Endo/Other  Diabetes well controlled type 2 Oral agent,   Comment:  this AM    GYN       Hematology   Musculoskeletal       Neurology   Psychology   Anxiety,              Physical Exam    Airway    Mallampati score: III  TM Distance: >3 FB  Neck ROM: full     Dental   No notable dental hx     Cardiovascular  Rhythm: regular, Rate: normal, Cardiovascular exam normal    Pulmonary  Pulmonary exam normal Breath sounds clear to auscultation,     Other Findings        Anesthesia Plan  ASA Score- 2     Anesthesia Type- IV sedation with anesthesia with ASA Monitors  Additional Monitors:   Airway Plan:         Plan Factors-    Induction- intravenous  Postoperative Plan- Plan for postoperative opioid use  Informed Consent- Anesthetic plan and risks discussed with patient and spouse  I personally reviewed this patient with the CRNA  Discussed and agreed on the Anesthesia Plan with the CRNA  Karrie Nissen

## 2018-11-16 NOTE — DISCHARGE INSTRUCTIONS
Colonoscopy   WHAT YOU NEED TO KNOW:   A colonoscopy is a procedure to examine the inside of your colon (intestine) with a scope  Polyps or tissue growths may have been removed during your colonoscopy  It is normal to feel bloated and to have some abdominal discomfort  You should be passing gas  If you have hemorrhoids or you had polyps removed, you may have a small amount of bleeding  DISCHARGE INSTRUCTIONS:   Seek care immediately if:   · You have a large amount of bright red blood in your bowel movements  · Your abdomen is hard and firm and you have severe pain  · You have sudden trouble breathing  Contact your healthcare provider if:   · You develop a rash or hives  · You have a fever within 24 hours of your procedure  · You have not had a bowel movement for 3 days after your procedure  · You have questions or concerns about your condition or care  Activity:   · Do not lift, strain, or run  for 3 days after your procedure  · Rest after your procedure  You have been given medicine to relax you  Do not  drive or make important decisions until the day after your procedure  Return to your normal activity as directed  · Relieve gas and discomfort from bloating  by lying on your right side with a heating pad on your abdomen  You may need to take short walks to help the gas move out  Eat small meals until bloating is relieved  If you had polyps removed: For 7 days after your procedure:  · Do not  take aspirin  · Do not  go on long car rides  Help prevent constipation:   · Eat a variety of healthy foods  Healthy foods include fruit, vegetables, whole-grain breads, low-fat dairy products, beans, lean meat, and fish  Ask if you need to be on a special diet  Your healthcare provider may recommend that you eat high-fiber foods such as cooked beans  Fiber helps you have regular bowel movements  · Drink liquids as directed    Adults should drink between 9 and 13 eight-ounce cups of liquid every day  Ask what amount is best for you  For most people, good liquids to drink are water, juice, and milk  · Exercise as directed  Talk to your healthcare provider about the best exercise plan for you  Exercise can help prevent constipation, decrease your blood pressure and improve your health  Follow up with your healthcare provider as directed:  Write down your questions so you remember to ask them during your visits  © 2017 [employer] Information is for End User's use only and may not be sold, redistributed or otherwise used for commercial purposes  All illustrations and images included in CareNotes® are the copyrighted property of [employer]  or [employer]  The above information is an  only  It is not intended as medical advice for individual conditions or treatments  Talk to your doctor, nurse or pharmacist before following any medical regimen to see if it is safe and effective for you

## 2018-11-16 NOTE — DISCHARGE INSTR - AVS FIRST PAGE
COLONOSCOPY    PROCEDURE: Colonoscopy    INDICATIONS: History of Colon Polyps; last exam in 2012    POST-OP DIAGNOSIS: See the impression below    SEDATION: Monitored anesthesia care, check anesthesia records    PHYSICAL EXAM:  Vitals:    11/16/18 0642   BP: 122/64   Pulse: 66   Resp: 12   Temp: (!) 97 2 °F (36 2 °C)   SpO2: 97%     Body mass index is 31 22 kg/m²  General: NAD  Heart: S1 & S2 normal, RRR  Lungs: CTA, No rales or rhonchi  Abdomen: Soft, nontender, nondistended, good bowel sounds    CONSENT:  Informed consent was obtained for the procedure, including sedation after explaining the risks and benefits of the procedure  Risks including but not limited to bleeding, perforation, infection, aspiration were discussed in detail  Also explained about less than 100%$ sensitivity with the exam and other alternatives  PREPARATION:   EKG tracing, pulse oximetry, blood pressure were monitored throughout the procedure  Patient was identified by myself both verbally and by visual inspection of ID band  DESCRIPTION:   Patient was placed in the left lateral decubitus position and was sedated with the above medication  Digital rectal examination was performed  The colonoscope was introduced in to the anal canal and advanced up to cecum, which was identified by the appendiceal orifice and IC valve  A careful inspection was made as the colonoscope was withdrawn, including a retroflexed view of the rectum; findings and interventions are described below  Appropriate photodocumentation was obtained  The quality of the colonic preparation was excellent  The blood loss was minimal     FINDINGS:  There was diverticulosis noted in the cecum ascending colon hepatic flexure splenic flexure descending colon and sigmoid colon    The transverse colon and rectum were normal          IMPRESSIONS:    Pandiverticulosis    RECOMMENDATIONS:  High-fiber diet  Repeat colonoscopy in 5 years    COMPLICATIONS:  None; patient tolerated the procedure well    DISPOSITION: PACU  CONDITION: Stable    Tai Garcia MD  11/16/2018,7:38 AM

## 2019-01-21 ENCOUNTER — OFFICE VISIT (OUTPATIENT)
Dept: INTERNAL MEDICINE CLINIC | Facility: CLINIC | Age: 64
End: 2019-01-21
Payer: MEDICARE

## 2019-01-21 VITALS
SYSTOLIC BLOOD PRESSURE: 124 MMHG | DIASTOLIC BLOOD PRESSURE: 78 MMHG | RESPIRATION RATE: 14 BRPM | WEIGHT: 214.6 LBS | BODY MASS INDEX: 31.78 KG/M2 | HEIGHT: 69 IN | HEART RATE: 72 BPM

## 2019-01-21 DIAGNOSIS — F33.42 RECURRENT MAJOR DEPRESSIVE DISORDER, IN FULL REMISSION (HCC): ICD-10-CM

## 2019-01-21 DIAGNOSIS — I10 ESSENTIAL HYPERTENSION: Primary | ICD-10-CM

## 2019-01-21 DIAGNOSIS — N40.1 BENIGN PROSTATIC HYPERPLASIA WITH LOWER URINARY TRACT SYMPTOMS, SYMPTOM DETAILS UNSPECIFIED: ICD-10-CM

## 2019-01-21 DIAGNOSIS — C90.01 MULTIPLE MYELOMA IN REMISSION (HCC): ICD-10-CM

## 2019-01-21 DIAGNOSIS — E11.9 TYPE 2 DIABETES MELLITUS WITHOUT COMPLICATION, WITHOUT LONG-TERM CURRENT USE OF INSULIN (HCC): ICD-10-CM

## 2019-01-21 PROBLEM — C90.00 MULTIPLE MYELOMA (HCC): Status: ACTIVE | Noted: 2019-01-21

## 2019-01-21 PROBLEM — I21.9 MYOCARDIAL INFARCTION (HCC): Status: RESOLVED | Noted: 2017-12-30 | Resolved: 2019-01-21

## 2019-01-21 PROCEDURE — 99204 OFFICE O/P NEW MOD 45 MIN: CPT | Performed by: INTERNAL MEDICINE

## 2019-01-21 RX ORDER — NITROGLYCERIN 0.4 MG/1
TABLET SUBLINGUAL
Refills: 0 | COMMUNITY
Start: 2019-01-18

## 2019-01-21 RX ORDER — BUPROPION HYDROCHLORIDE 150 MG/1
450 TABLET ORAL DAILY
Refills: 4 | COMMUNITY
Start: 2019-01-14 | End: 2021-04-14

## 2019-01-21 RX ORDER — ALPRAZOLAM 1 MG/1
1 TABLET ORAL 2 TIMES DAILY PRN
Refills: 0 | COMMUNITY
Start: 2018-12-07 | End: 2022-03-15

## 2019-01-21 RX ORDER — SITAGLIPTIN AND METFORMIN HYDROCHLORIDE 1000; 50 MG/1; MG/1
2 TABLET, FILM COATED, EXTENDED RELEASE ORAL EVERY EVENING
Refills: 2 | COMMUNITY
Start: 2018-12-22 | End: 2022-03-16 | Stop reason: DRUGHIGH

## 2019-01-21 NOTE — PROGRESS NOTES
Assessment/Plan:       Diagnoses and all orders for this visit:    Essential hypertension    Benign prostatic hyperplasia with lower urinary tract symptoms, symptom details unspecified    Multiple myeloma in remission ([facility] )    Recurrent major depressive disorder, in full remission ([facility] )    Type 2 diabetes mellitus without complication, without long-term current use of insulin ([facility] )    Other orders  -     ALPRAZolam (XANAX) 1 mg tablet; 1 mg 2 (two) times a day as needed  -     buPROPion (WELLBUTRIN XL) 150 mg 24 hr tablet; Take 450 mg by mouth daily    -     nitroglycerin (NITROSTAT) 0 4 mg SL tablet; PLACE 1 TABLET UNDER TONGUE EVERY 5 MINS 3 TIMES IF NEEDED FOR CHEST PAIN  -     JANUMET XR  MG TB24; Take 2 tablets by mouth every evening          Patient Instructions   Chronic diagnoses noted above were treated and stable  Multiple specialty follow-up so noted  Laboratory data reviewed from October; no abnormalities that need to be addressed  See primary care again yearly  See me at the time of your birthday  Call if problems develop  Subjective:      Patient ID: Irina Tristan is a 66 y o  male  A 37-year-old male who has multiple health problems but was treated by multiple specialists  Multiple myeloma in remission followed at [facility], on [address]   Currently taking acyclovir for prevention of shingles as a part of this process  Major depression treated with bupropion  Diabetic followed by [facility]  Last A1c reported to be less than 6  BPH followed by Dr Gatito Yanes  Coronary artery disease  Myocardial infarction in December 2017  Treated at [facility][1] with stenting  Currently on medication and followed by Dr Ness Yang    Only surgical history is tonsillectomy and right inguinal hernia  Occasional alcohol, no drugs no smoking  A retiree from [employer]    At this moment he feels well  No symptoms reported          The following portions of the patient's history were reviewed and updated as appropriate:   He has a past medical history of Back problem; Diabetes ([facility] ); History of depression; Hyperlipidemia; Hypertension; Multiple myeloma ([facility] ); and Myocardial infarction ([facility] )  ,   does not have any pertinent problems on file  ,   has a past surgical history that includes Hernia repair; TONSILLECTOMY; Cardiac catheterization; Tonsillectomy; and pr colonoscopy flx dx w/collj spec when pfrmd (N/A, 11/16/2018)  ,  family history includes Diabetes in his brother; Heart disease in his father  ,   reports that he has never smoked  He has never used smokeless tobacco  He reports that he drinks alcohol  He reports that he does not use drugs  ,  is allergic to iodinated diagnostic agents; iodine; trilipix [choline fenofibrate]; atorvastatin; iodine solution [povidone iodine]; shellfish allergy; and statins     Current Outpatient Prescriptions   Medication Sig Dispense Refill    acyclovir (ZOVIRAX) 400 MG tablet Take 400 mg by mouth daily  5    aspirin (ECOTRIN LOW STRENGTH) 81 mg EC tablet Take 81 mg by mouth daily      buPROPion (WELLBUTRIN XL) 150 mg 24 hr tablet Take 450 mg by mouth daily    4    cholecalciferol (VITAMIN D3) 1,000 units tablet Take 1,000 Units by mouth daily      cyanocobalamin (VITAMIN B-12) 1,000 mcg tablet Take by mouth daily      glimepiride (AMARYL) 1 mg tablet Take 1 mg by mouth every morning before breakfast      JANUMET XR  MG TB24 Take 2 tablets by mouth every evening  2    ketoconazole (NIZORAL) 2 % cream BETWEEN ALL TOES as needed  2    lenalidomide (REVLIMID) 5 MG CAPS Take 5 mg by mouth daily      losartan (COZAAR) 25 mg tablet Take 25 mg by mouth daily  1    metoprolol tartrate (LOPRESSOR) 25 mg tablet Take 25 mg by mouth every 12 (twelve) hours      nitroglycerin (NITROSTAT) 0 4 mg SL tablet PLACE 1 TABLET UNDER TONGUE EVERY 5 MINS 3 TIMES IF NEEDED FOR CHEST PAIN  0    omeprazole (PriLOSEC) 20 mg delayed release capsule Take 20 mg by mouth daily      rosuvastatin (CRESTOR) 5 mg tablet Take 5 mg by mouth daily      sildenafil (VIAGRA) 50 MG tablet Take 1 tablet (50 mg total) by mouth daily as needed for erectile dysfunction 10 tablet 6    tamsulosin (FLOMAX) 0 4 mg Take 1 capsule (0 4 mg total) by mouth daily with dinner 90 capsule 3    traZODone (DESYREL) 50 mg tablet 50 mg daily at bedtime as needed for sleep  1    zoledronic acid (ZOMETA) 4 mg/100 mL Infuse 4 mg into a venous catheter once      ALPRAZolam (XANAX) 1 mg tablet 1 mg 2 (two) times a day as needed  0    ALPRAZolam (XANAX) 2 MG tablet Take by mouth      chlorhexidine (PERIDEX) 0 12 % solution RINSE FOR 60 SECONDS AND EXPECTORATE TWICE DAILY  2     No current facility-administered medications for this visit  Review of Systems   Constitutional: Negative for chills and fever  HENT: Negative for sore throat and trouble swallowing  Eyes: Negative for pain  Respiratory: Negative for cough and shortness of breath  Cardiovascular: Negative for chest pain and palpitations  Gastrointestinal: Negative for abdominal pain, blood in stool, diarrhea, nausea and vomiting  Endocrine: Negative for cold intolerance and heat intolerance  Genitourinary: Negative for dysuria, frequency and testicular pain  Musculoskeletal: Negative for joint swelling  Allergic/Immunologic: Negative for immunocompromised state  Neurological: Negative for dizziness, syncope and headaches  Hematological: Negative for adenopathy  Does not bruise/bleed easily  Psychiatric/Behavioral: Negative for dysphoric mood  The patient is not nervous/anxious  Objective:  Vitals:    01/21/19 0918   BP: 124/78   Pulse: 72   Resp: 14      Physical Exam   Constitutional: He is oriented to person, place, and time  He appears well-developed and well-nourished  No distress     An alert moderately overweight male patient who appears to be stated age   HENT:   Head: Normocephalic and atraumatic  Eyes: Pupils are equal, round, and reactive to light  Neck: Normal range of motion  Neck supple  No tracheal deviation present  No thyromegaly present  Cardiovascular: Normal rate, regular rhythm and normal heart sounds  Exam reveals no gallop  No murmur heard  Pulmonary/Chest: Effort normal  No respiratory distress  He has no wheezes  He has no rales  Abdominal: Soft  Bowel sounds are normal  There is no tenderness  Musculoskeletal: Normal range of motion  He exhibits no tenderness or deformity  Neurological: He is alert and oriented to person, place, and time  He has normal reflexes  Coordination normal    Skin: Skin is warm and dry  Psychiatric: He has a normal mood and affect

## 2019-01-21 NOTE — PATIENT INSTRUCTIONS
Chronic diagnoses noted above were treated and stable  Multiple specialty follow-up so noted  Laboratory data reviewed from October; no abnormalities that need to be addressed  See primary care again yearly  See me at the time of your birthday  Call if problems develop

## 2019-03-11 LAB — HBA1C MFR BLD HPLC: 6.3 %

## 2019-05-06 ENCOUNTER — TELEPHONE (OUTPATIENT)
Dept: INTERNAL MEDICINE CLINIC | Facility: CLINIC | Age: 64
End: 2019-05-06

## 2019-05-23 DIAGNOSIS — N40.0 BENIGN PROSTATIC HYPERPLASIA, UNSPECIFIED WHETHER LOWER URINARY TRACT SYMPTOMS PRESENT: ICD-10-CM

## 2019-05-23 RX ORDER — TAMSULOSIN HYDROCHLORIDE 0.4 MG/1
0.8 CAPSULE ORAL
Qty: 90 CAPSULE | Refills: 3 | Status: SHIPPED | OUTPATIENT
Start: 2019-05-23 | End: 2019-11-19 | Stop reason: SDUPTHER

## 2019-06-11 ENCOUNTER — TRANSCRIBE ORDERS (OUTPATIENT)
Dept: LAB | Facility: CLINIC | Age: 64
End: 2019-06-11

## 2019-06-11 ENCOUNTER — APPOINTMENT (OUTPATIENT)
Dept: LAB | Facility: CLINIC | Age: 64
End: 2019-06-11
Payer: MEDICARE

## 2019-06-11 DIAGNOSIS — C90.00 MYELOMA ASSOCIATED AMYLOIDOSIS (HCC): ICD-10-CM

## 2019-06-11 DIAGNOSIS — E85.9 MYELOMA ASSOCIATED AMYLOIDOSIS (HCC): ICD-10-CM

## 2019-06-11 DIAGNOSIS — E85.9 MYELOMA ASSOCIATED AMYLOIDOSIS (HCC): Primary | ICD-10-CM

## 2019-06-11 DIAGNOSIS — C90.00 MYELOMA ASSOCIATED AMYLOIDOSIS (HCC): Primary | ICD-10-CM

## 2019-06-11 LAB
BASOPHILS # BLD AUTO: 0.03 THOUSANDS/ΜL (ref 0–0.1)
BASOPHILS NFR BLD AUTO: 1 % (ref 0–1)
EOSINOPHIL # BLD AUTO: 0.38 THOUSAND/ΜL (ref 0–0.61)
EOSINOPHIL NFR BLD AUTO: 12 % (ref 0–6)
ERYTHROCYTE [DISTWIDTH] IN BLOOD BY AUTOMATED COUNT: 19.4 % (ref 11.6–15.1)
HCT VFR BLD AUTO: 30.1 % (ref 36.5–49.3)
HGB BLD-MCNC: 9.4 G/DL (ref 12–17)
IMM GRANULOCYTES # BLD AUTO: 0.01 THOUSAND/UL (ref 0–0.2)
IMM GRANULOCYTES NFR BLD AUTO: 0 % (ref 0–2)
LYMPHOCYTES # BLD AUTO: 0.96 THOUSANDS/ΜL (ref 0.6–4.47)
LYMPHOCYTES NFR BLD AUTO: 30 % (ref 14–44)
MCH RBC QN AUTO: 24.9 PG (ref 26.8–34.3)
MCHC RBC AUTO-ENTMCNC: 31.2 G/DL (ref 31.4–37.4)
MCV RBC AUTO: 80 FL (ref 82–98)
MONOCYTES # BLD AUTO: 0.27 THOUSAND/ΜL (ref 0.17–1.22)
MONOCYTES NFR BLD AUTO: 9 % (ref 4–12)
NEUTROPHILS # BLD AUTO: 1.51 THOUSANDS/ΜL (ref 1.85–7.62)
NEUTS SEG NFR BLD AUTO: 48 % (ref 43–75)
NRBC BLD AUTO-RTO: 1 /100 WBCS
RBC # BLD AUTO: 3.77 MILLION/UL (ref 3.88–5.62)
WBC # BLD AUTO: 3.16 THOUSAND/UL (ref 4.31–10.16)

## 2019-06-11 PROCEDURE — 36415 COLL VENOUS BLD VENIPUNCTURE: CPT

## 2019-06-11 PROCEDURE — 85025 COMPLETE CBC W/AUTO DIFF WBC: CPT

## 2019-09-04 ENCOUNTER — APPOINTMENT (OUTPATIENT)
Dept: LAB | Facility: CLINIC | Age: 64
End: 2019-09-04
Payer: MEDICARE

## 2019-09-04 ENCOUNTER — TRANSCRIBE ORDERS (OUTPATIENT)
Dept: ADMINISTRATIVE | Facility: HOSPITAL | Age: 64
End: 2019-09-04

## 2019-09-04 DIAGNOSIS — C90.00 MYELOMA ASSOCIATED AMYLOIDOSIS (HCC): Primary | ICD-10-CM

## 2019-09-04 DIAGNOSIS — E85.9 MYELOMA ASSOCIATED AMYLOIDOSIS (HCC): ICD-10-CM

## 2019-09-04 DIAGNOSIS — E85.9 MYELOMA ASSOCIATED AMYLOIDOSIS (HCC): Primary | ICD-10-CM

## 2019-09-04 DIAGNOSIS — C90.00 MYELOMA ASSOCIATED AMYLOIDOSIS (HCC): ICD-10-CM

## 2019-09-04 LAB
ANISOCYTOSIS BLD QL SMEAR: PRESENT
BASOPHILS # BLD MANUAL: 0 THOUSAND/UL (ref 0–0.1)
BASOPHILS NFR MAR MANUAL: 0 % (ref 0–1)
DACRYOCYTES BLD QL SMEAR: PRESENT
EOSINOPHIL # BLD MANUAL: 0.23 THOUSAND/UL (ref 0–0.4)
EOSINOPHIL NFR BLD MANUAL: 6 % (ref 0–6)
ERYTHROCYTE [DISTWIDTH] IN BLOOD BY AUTOMATED COUNT: 19.6 % (ref 11.6–15.1)
HCT VFR BLD AUTO: 32.5 % (ref 36.5–49.3)
HGB BLD-MCNC: 10.1 G/DL (ref 12–17)
LYMPHOCYTES # BLD AUTO: 1.39 THOUSAND/UL (ref 0.6–4.47)
LYMPHOCYTES # BLD AUTO: 36 % (ref 14–44)
MCH RBC QN AUTO: 24.8 PG (ref 26.8–34.3)
MCHC RBC AUTO-ENTMCNC: 31.1 G/DL (ref 31.4–37.4)
MCV RBC AUTO: 80 FL (ref 82–98)
MICROCYTES BLD QL AUTO: PRESENT
MONOCYTES # BLD AUTO: 0.31 THOUSAND/UL (ref 0–1.22)
MONOCYTES NFR BLD: 8 % (ref 4–12)
NEUTROPHILS # BLD MANUAL: 1.89 THOUSAND/UL (ref 1.85–7.62)
NEUTS SEG NFR BLD AUTO: 49 % (ref 43–75)
NRBC BLD AUTO-RTO: 1 /100 WBCS
PLATELET # BLD AUTO: 69 THOUSANDS/UL (ref 149–390)
PLATELET BLD QL SMEAR: ABNORMAL
POIKILOCYTOSIS BLD QL SMEAR: PRESENT
RBC # BLD AUTO: 4.07 MILLION/UL (ref 3.88–5.62)
RBC MORPH BLD: PRESENT
VARIANT LYMPHS # BLD AUTO: 1 %
WBC # BLD AUTO: 3.85 THOUSAND/UL (ref 4.31–10.16)

## 2019-09-04 PROCEDURE — 36415 COLL VENOUS BLD VENIPUNCTURE: CPT

## 2019-09-04 PROCEDURE — 85027 COMPLETE CBC AUTOMATED: CPT

## 2019-09-04 PROCEDURE — 85007 BL SMEAR W/DIFF WBC COUNT: CPT

## 2019-09-12 ENCOUNTER — TELEPHONE (OUTPATIENT)
Dept: INTERNAL MEDICINE CLINIC | Facility: CLINIC | Age: 64
End: 2019-09-12

## 2019-09-17 ENCOUNTER — OFFICE VISIT (OUTPATIENT)
Dept: INTERNAL MEDICINE CLINIC | Facility: CLINIC | Age: 64
End: 2019-09-17
Payer: MEDICARE

## 2019-09-17 DIAGNOSIS — G62.9 NEUROPATHY: Primary | ICD-10-CM

## 2019-09-17 DIAGNOSIS — C90.01 MULTIPLE MYELOMA IN REMISSION (HCC): ICD-10-CM

## 2019-09-17 DIAGNOSIS — E11.9 TYPE 2 DIABETES MELLITUS WITHOUT COMPLICATION, WITHOUT LONG-TERM CURRENT USE OF INSULIN (HCC): ICD-10-CM

## 2019-09-17 DIAGNOSIS — E78.2 MIXED HYPERLIPIDEMIA: ICD-10-CM

## 2019-09-17 DIAGNOSIS — I10 ESSENTIAL HYPERTENSION: ICD-10-CM

## 2019-09-17 DIAGNOSIS — R13.12 OROPHARYNGEAL DYSPHAGIA: ICD-10-CM

## 2019-09-17 PROBLEM — E78.5 HYPERLIPIDEMIA: Status: ACTIVE | Noted: 2019-09-17

## 2019-09-17 PROCEDURE — 99213 OFFICE O/P EST LOW 20 MIN: CPT | Performed by: INTERNAL MEDICINE

## 2019-09-17 NOTE — PATIENT INSTRUCTIONS
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

## 2019-09-17 NOTE — PROGRESS NOTES
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  However, not severe enough to need medication  Nevertheless, the next time the patient gets good routine call laboratory panels done check in particular B12 and folate and thyroid  Referral for video fluoroscopy with speech therapy to evaluate oropharyngeal dysphagia  See me back here again in about a year  However, call to review these various test reports  Continue to follow with various specialists  Subjective:      Patient ID: Missy Marques is a 60 y o  male  A 29-year-old male who has multiple health problems but was treated by multiple specialists  Multiple myeloma in remission followed at [facility], on [place]   Currently taking acyclovir for prevention of shingles as a part of this process  Major depression treated with bupropion  Diabetic followed by [facility]  Last A1c reported to be less than 6  BPH followed by Dr Isma Hirsch  Coronary artery disease  Myocardial infarction in December 2017  Treated at [facility] with stenting  Currently on medication and followed by Dr Johanna Salas    Only surgical history is tonsillectomy and right inguinal hernia  Occasional alcohol, no drugs no smoking  A retiree from the [employer]    A chief complaint verbalized today of coughing which happens while he eats  It is not constant but it does occur    He also has a sensation of difficulty handling his secretions  He has a low-grade sensation of occasionally choking on his saliva  This is been going on for at least 6 months  He has never had radiation therapy in the neck  He has not had any change in his medication regimes in the 6 months since I have last seen him    Another complaint is that of paresthesias of both feet which have developed in the past 6 months  He has a sensation as if he has calluses on the bottom of both feet with a reduction in sensation  The following portions of the patient's history were reviewed and updated as appropriate:   He has a past medical history of Back problem, Diabetes ([facility] ), History of depression, Hyperlipidemia, Hypertension, Multiple myeloma ([facility] ), and Myocardial infarction ([facility] )  ,  does not have any pertinent problems on file  ,   has a past surgical history that includes Hernia repair; TONSILLECTOMY; Cardiac catheterization; Tonsillectomy; and pr colonoscopy flx dx w/collj spec when pfrmd (N/A, 11/16/2018)  ,  family history includes Diabetes in his brother; Heart disease in his father  ,   reports that he has never smoked  He has never used smokeless tobacco  He reports that he drinks alcohol  He reports that he does not use drugs  ,  is allergic to iodinated diagnostic agents; iodine; trilipix [choline fenofibrate]; atorvastatin; iodine solution [povidone iodine]; and shellfish allergy     Current Outpatient Medications   Medication Sig Dispense Refill    acyclovir (ZOVIRAX) 400 MG tablet Take 400 mg by mouth daily  5    ALPRAZolam (XANAX) 1 mg tablet 1 mg 2 (two) times a day as needed  0    ALPRAZolam (XANAX) 2 MG tablet Take by mouth      aspirin (ECOTRIN LOW STRENGTH) 81 mg EC tablet Take 81 mg by mouth daily      buPROPion (WELLBUTRIN XL) 150 mg 24 hr tablet Take 450 mg by mouth daily    4    cholecalciferol (VITAMIN D3) 1,000 units tablet Take 1,000 Units by mouth daily      cyanocobalamin (VITAMIN B-12) 1,000 mcg tablet Take by mouth daily      glimepiride (AMARYL) 1 mg tablet Take 1 mg by mouth every morning before breakfast      JANUMET XR  MG TB24 Take 2 tablets by mouth every evening  2    ketoconazole (NIZORAL) 2 % cream BETWEEN ALL TOES as needed  2    lenalidomide (REVLIMID) 5 MG CAPS Take 5 mg by mouth daily      losartan (COZAAR) 25 mg tablet Take 25 mg by mouth daily  1    metoprolol tartrate (LOPRESSOR) 25 mg tablet Take 25 mg by mouth every 12 (twelve) hours      nitroglycerin (NITROSTAT) 0 4 mg SL tablet PLACE 1 TABLET UNDER TONGUE EVERY 5 MINS 3 TIMES IF NEEDED FOR CHEST PAIN  0    omeprazole (PriLOSEC) 20 mg delayed release capsule Take 20 mg by mouth daily      rosuvastatin (CRESTOR) 5 mg tablet Take 5 mg by mouth daily      sildenafil (VIAGRA) 50 MG tablet Take 1 tablet (50 mg total) by mouth daily as needed for erectile dysfunction 10 tablet 6    tamsulosin (FLOMAX) 0 4 mg Take 2 capsules (0 8 mg total) by mouth daily with dinner 90 capsule 3    traZODone (DESYREL) 50 mg tablet 50 mg daily at bedtime as needed for sleep  1    zoledronic acid (ZOMETA) 4 mg/100 mL Infuse 4 mg into a venous catheter once      chlorhexidine (PERIDEX) 0 12 % solution RINSE FOR 60 SECONDS AND EXPECTORATE TWICE DAILY  2     No current facility-administered medications for this visit  Review of Systems   Constitutional: Negative for chills and fever  HENT: Negative for sore throat and trouble swallowing  Eyes: Negative for pain  Respiratory: Negative for cough and shortness of breath  Cardiovascular: Negative for chest pain and palpitations  Gastrointestinal: Negative for abdominal pain, blood in stool, diarrhea, nausea and vomiting  Endocrine: Negative for cold intolerance and heat intolerance  Genitourinary: Negative for dysuria, frequency and testicular pain  Musculoskeletal: Negative for joint swelling  Allergic/Immunologic: Negative for immunocompromised state  Neurological: Positive for numbness   Negative for dizziness, syncope and headaches  Hematological: Negative for adenopathy  Does not bruise/bleed easily  Psychiatric/Behavioral: Negative for dysphoric mood  The patient is not nervous/anxious  Objective: There were no vitals filed for this visit  Physical Exam   Constitutional: He is oriented to person, place, and time  He appears well-developed and well-nourished  No distress  An alert moderately overweight male patient who appears to be stated age   HENT:   Head: Normocephalic and atraumatic  Eyes: Pupils are equal, round, and reactive to light  Neck: Normal range of motion  Neck supple  No tracheal deviation present  No thyromegaly present  Cardiovascular: Normal rate, regular rhythm and normal heart sounds  Exam reveals no gallop  No murmur heard  Pulmonary/Chest: Effort normal  No respiratory distress  He has no wheezes  He has no rales  Abdominal: Soft  Bowel sounds are normal  There is no tenderness  Musculoskeletal: Normal range of motion  He exhibits no tenderness or deformity  Neurological: He is alert and oriented to person, place, and time  He has normal reflexes  Coordination normal    Skin: Skin is warm and dry  Psychiatric: He has a normal mood and affect

## 2019-09-17 NOTE — PROGRESS NOTES
Diabetic Foot Exam    Patient's shoes and socks removed  Right Foot/Ankle   Right Foot Inspection  Skin Exam: skin intact                            Sensory       Monofilament testing: diminished  Vascular    The right DP pulse is 0  The right PT pulse is 2+  Left Foot/Ankle  Left Foot Inspection  Skin Exam: skin intact                                         Sensory       Monofilament: diminished  Vascular    The left DP pulse is 0  The left PT pulse is 2+  Assign Risk Category:  No deformity present;  Loss of protective sensation; Weak pulses       Risk: 2

## 2019-10-01 ENCOUNTER — HOSPITAL ENCOUNTER (OUTPATIENT)
Dept: RADIOLOGY | Facility: HOSPITAL | Age: 64
Discharge: HOME | End: 2019-10-01
Attending: INTERNAL MEDICINE
Payer: MEDICARE

## 2019-10-01 DIAGNOSIS — R13.12 OROPHARYNGEAL DYSPHAGIA: ICD-10-CM

## 2019-10-01 PROCEDURE — G8998 SWALLOW D/C STATUS: HCPCS

## 2019-10-01 PROCEDURE — 92611 MOTION FLUOROSCOPY/SWALLOW: CPT

## 2019-10-01 PROCEDURE — G8996 SWALLOW CURRENT STATUS: HCPCS

## 2019-10-01 PROCEDURE — 74230 X-RAY XM SWLNG FUNCJ C+: CPT

## 2019-10-01 PROCEDURE — G8997 SWALLOW GOAL STATUS: HCPCS

## 2019-10-01 NOTE — PROCEDURES
Speech-Language Pathology Videofluoroscopic Swallow Study      Patient Name: Mya Pedro    CHCRW'N Date: 10/1/2019     Problem List  Active Problems:    * No active hospital problems  *      Past Medical History  Past Medical History:   Diagnosis Date    Back problem     Diabetes [facility] )     History of depression     Hyperlipidemia     Hypertension     Multiple myeloma ([facility] )     Myocardial infarction [facility])        Past Surgical History  Past Surgical History:   Procedure Laterality Date    CARDIAC CATHETERIZATION      HERNIA REPAIR      [place] COLONOSCOPY FLX DX W/COLLJ SPEC WHEN PFRMD N/A 11/16/2018    Procedure: COLONOSCOPY;  Surgeon: Homero Pascual MD;  Location: [facility]; Service: Gastroenterology    TONSILLECTOMY      TONSILLECTOMY           General Information;  Mr Mya Pedro is a 60 y o  male referred by his primary care physician given "coughing which happens while he eats  It is not constant but it does occur  He also has a sensation of difficulty handling his secretions  He has a low-grade sensation of occasionally choking on his saliva  This is been going on for at least 6 months "  "Raisin bran really gives me trouble "  VBS was recommended to assess oropharyngeal stage swallowing skills at this time  He was viewed in lateral position and was given trials of pureed, soft moist food (bites of Nutrigrain bar) and solid food (dense hard cookie) as well as nectar (mildly thick), thin liquid, food mixed with thin liquid and a13mm pill with thin liquid  Oral stage:  Mild oral stage dysphagia dymptoms     Mastication was timely and effective with materials administered today  Bolus formation and transfer were functional to mildly impaired with mild oral residue noted with foods  Oral control was mildly impaired as evidenced by episodes of  premature spillage over the base of tongue with food, food/liquid combination and saliva  Pharyngeal stage;   Pt presented with pharyngeal swallowing skills that were functional to mildly impaired  Swallowing initiation was prompt with liquids  Oral spillage and/or mildly delayed initiation noted episodically with foods  Epiglottis appeared to abut the posterior tongue, essentially minimizing vallecular space (increasing the risk for saliva and food to spill to the hypopharynx or laryngeal inlet)  Hyolaryngeal rise and anterior displacement appeared adequate  Tongue base retraction appeared to be of adequate strength  Pharyngeal constriction also appeared adequate  Management of food/liquid follows: All liquid passed through the pharynx with ease and safety with no penetration, aspiration or significant pharyngeal residue noted despite challenge with consecutive sips  With pureed foods, mild oral residue and SALIVA spilled after the primary swallow with TRACE amount of material penetrating into the larynx (+cough produced in response to this mild penetration)  With soft food, solid food and material spilled down lateral channels of the pharynx just reaching the pyriform spaces before swallows were initiated  Given mixed food/liquid bolus, material spilled deeply and filled the pyriform spaces before the swallow with increased aspiration risk  Despite the spillage and delay (risk), no penetration or aspiration occurred with foods today  Given a 13 mm pill with a teaspoon of thin liquid, the liquid traversed the pharynx safely but the pill was retained in the vallecular space  Given large sips of liquid, the pill was then transferred to the pharynx  Strategies and Efficacy: NECK FLEXION and SECONDARY SWALLOW lessened spillage of material from the mouth and managed oral residue and saliva in a limited sample today  Esophageal stage;  Esophageal screening was completed with no gross stasis identified  Assessment Summary; Pt presents with mild oropharyngeal dysphagia as described above   I am uncertain if he has a variant of normal re: shape and position of epiglottis (eg omega shaped epiglottis which limited his vallecular space) which contributes to presumed occasions of material (eg saliva or trace amount of food/liquid) penetrating into his laryngeal inlet versus other  I am hopeful that strategies will lessen his symptoms  Recommendations: Diet as desired and tolerated (consider minimizing mixed food/liquid boluses especially raisin bran or any dense dry cereal w/milk (eg shredded wheat)  Recommended Form of Meds: whole with large sip or sips of liquid     Aspiration precautions and/or compensatory strategies:   1  Use "chin down" (neck flexion) before/durign swallowing (use gravity to avoid spillage/"Seepage" of foods from mouth into pharynx  2  Eat slowly, allowing time to swallow twice with foods    Results Reviewed with: patient     Treatment Recommended: I plan to follow-up by phone in 2 weeks to assess iof use of strategies is lessening throat clearing and cough with oral intake  Thank you for this referral   Please do not hesitate to contact me with any questions or concerns      Alka [facility]   Inpatient Speech Pathologist  [place] License [id number]    Available via [facility] Text or  Cell [phone]

## 2019-10-02 ENCOUNTER — APPOINTMENT (OUTPATIENT)
Dept: LAB | Facility: CLINIC | Age: 64
End: 2019-10-02
Payer: MEDICARE

## 2019-10-02 DIAGNOSIS — C90.00 MYELOMA ASSOCIATED AMYLOIDOSIS (HCC): ICD-10-CM

## 2019-10-02 DIAGNOSIS — G62.9 NEUROPATHY: ICD-10-CM

## 2019-10-02 DIAGNOSIS — E85.9 MYELOMA ASSOCIATED AMYLOIDOSIS (HCC): ICD-10-CM

## 2019-10-02 LAB
BASOPHILS # BLD AUTO: 0.02 THOUSANDS/ΜL (ref 0–0.1)
BASOPHILS NFR BLD AUTO: 1 % (ref 0–1)
EOSINOPHIL # BLD AUTO: 0.32 THOUSAND/ΜL (ref 0–0.61)
EOSINOPHIL NFR BLD AUTO: 10 % (ref 0–6)
ERYTHROCYTE [DISTWIDTH] IN BLOOD BY AUTOMATED COUNT: 19.3 % (ref 11.6–15.1)
FOLATE SERPL-MCNC: 11.7 NG/ML (ref 3.1–17.5)
HCT VFR BLD AUTO: 32.5 % (ref 36.5–49.3)
HGB BLD-MCNC: 10.2 G/DL (ref 12–17)
IMM GRANULOCYTES # BLD AUTO: 0.01 THOUSAND/UL (ref 0–0.2)
IMM GRANULOCYTES NFR BLD AUTO: 0 % (ref 0–2)
IRON SERPL-MCNC: 104 UG/DL (ref 65–175)
LYMPHOCYTES # BLD AUTO: 1.08 THOUSANDS/ΜL (ref 0.6–4.47)
LYMPHOCYTES NFR BLD AUTO: 32 % (ref 14–44)
MCH RBC QN AUTO: 25.1 PG (ref 26.8–34.3)
MCHC RBC AUTO-ENTMCNC: 31.4 G/DL (ref 31.4–37.4)
MCV RBC AUTO: 80 FL (ref 82–98)
MONOCYTES # BLD AUTO: 0.3 THOUSAND/ΜL (ref 0.17–1.22)
MONOCYTES NFR BLD AUTO: 9 % (ref 4–12)
NEUTROPHILS # BLD AUTO: 1.65 THOUSANDS/ΜL (ref 1.85–7.62)
NEUTS SEG NFR BLD AUTO: 48 % (ref 43–75)
NRBC BLD AUTO-RTO: 1 /100 WBCS
PLATELET # BLD AUTO: 64 THOUSANDS/UL (ref 149–390)
RBC # BLD AUTO: 4.07 MILLION/UL (ref 3.88–5.62)
TSH SERPL DL<=0.05 MIU/L-ACNC: 1.05 UIU/ML (ref 0.36–3.74)
VIT B12 SERPL-MCNC: 493 PG/ML (ref 100–900)
WBC # BLD AUTO: 3.38 THOUSAND/UL (ref 4.31–10.16)

## 2019-10-02 PROCEDURE — 85025 COMPLETE CBC W/AUTO DIFF WBC: CPT

## 2019-10-02 PROCEDURE — 82607 VITAMIN B-12: CPT

## 2019-10-02 PROCEDURE — 84443 ASSAY THYROID STIM HORMONE: CPT

## 2019-10-02 PROCEDURE — 36415 COLL VENOUS BLD VENIPUNCTURE: CPT

## 2019-10-02 PROCEDURE — 82746 ASSAY OF FOLIC ACID SERUM: CPT

## 2019-10-02 PROCEDURE — 83540 ASSAY OF IRON: CPT

## 2019-10-21 ENCOUNTER — APPOINTMENT (OUTPATIENT)
Dept: LAB | Facility: CLINIC | Age: 64
End: 2019-10-21
Payer: MEDICARE

## 2019-10-21 ENCOUNTER — TRANSCRIBE ORDERS (OUTPATIENT)
Dept: ADMINISTRATIVE | Facility: HOSPITAL | Age: 64
End: 2019-10-21

## 2019-10-21 DIAGNOSIS — E11.649 UNCONTROLLED TYPE 2 DIABETES MELLITUS WITH HYPOGLYCEMIA, UNSPECIFIED HYPOGLYCEMIA COMA STATUS (HCC): ICD-10-CM

## 2019-10-21 DIAGNOSIS — E55.9 AVITAMINOSIS D: ICD-10-CM

## 2019-10-21 DIAGNOSIS — E11.649 UNCONTROLLED TYPE 2 DIABETES MELLITUS WITH HYPOGLYCEMIA, UNSPECIFIED HYPOGLYCEMIA COMA STATUS (HCC): Primary | ICD-10-CM

## 2019-10-21 DIAGNOSIS — E53.8 BIOTIN-(PROPIONYL-COA-CARBOXYLASE) LIGASE DEFICIENCY: ICD-10-CM

## 2019-10-21 LAB
25(OH)D3 SERPL-MCNC: 27 NG/ML (ref 30–100)
ALBUMIN SERPL BCP-MCNC: 3.9 G/DL (ref 3.5–5)
ALP SERPL-CCNC: 112 U/L (ref 46–116)
ALT SERPL W P-5'-P-CCNC: 51 U/L (ref 12–78)
ANION GAP SERPL CALCULATED.3IONS-SCNC: 6 MMOL/L (ref 4–13)
AST SERPL W P-5'-P-CCNC: 28 U/L (ref 5–45)
BILIRUB SERPL-MCNC: 0.98 MG/DL (ref 0.2–1)
BUN SERPL-MCNC: 19 MG/DL (ref 5–25)
CALCIUM SERPL-MCNC: 9.1 MG/DL (ref 8.3–10.1)
CHLORIDE SERPL-SCNC: 106 MMOL/L (ref 100–108)
CHOLEST SERPL-MCNC: 80 MG/DL (ref 50–200)
CO2 SERPL-SCNC: 24 MMOL/L (ref 21–32)
CREAT SERPL-MCNC: 1.29 MG/DL (ref 0.6–1.3)
EST. AVERAGE GLUCOSE BLD GHB EST-MCNC: 103 MG/DL
GFR SERPL CREATININE-BSD FRML MDRD: 59 ML/MIN/1.73SQ M
GLUCOSE P FAST SERPL-MCNC: 158 MG/DL (ref 65–99)
HBA1C MFR BLD: 5.2 % (ref 4.2–6.3)
HDLC SERPL-MCNC: 31 MG/DL
LDLC SERPL CALC-MCNC: 1 MG/DL (ref 0–100)
NONHDLC SERPL-MCNC: 49 MG/DL
POTASSIUM SERPL-SCNC: 4.5 MMOL/L (ref 3.5–5.3)
PROT SERPL-MCNC: 7 G/DL (ref 6.4–8.2)
SODIUM SERPL-SCNC: 136 MMOL/L (ref 136–145)
T4 FREE SERPL-MCNC: 2.13 NG/DL (ref 0.76–1.46)
TRIGL SERPL-MCNC: 240 MG/DL
TSH SERPL DL<=0.05 MIU/L-ACNC: 1.14 UIU/ML (ref 0.36–3.74)
VIT B12 SERPL-MCNC: 455 PG/ML (ref 100–900)

## 2019-10-21 PROCEDURE — 83036 HEMOGLOBIN GLYCOSYLATED A1C: CPT

## 2019-10-21 PROCEDURE — 84443 ASSAY THYROID STIM HORMONE: CPT

## 2019-10-21 PROCEDURE — 82607 VITAMIN B-12: CPT

## 2019-10-21 PROCEDURE — 80053 COMPREHEN METABOLIC PANEL: CPT

## 2019-10-21 PROCEDURE — 84439 ASSAY OF FREE THYROXINE: CPT

## 2019-10-21 PROCEDURE — 82306 VITAMIN D 25 HYDROXY: CPT

## 2019-10-21 PROCEDURE — 80061 LIPID PANEL: CPT

## 2019-10-21 PROCEDURE — 36415 COLL VENOUS BLD VENIPUNCTURE: CPT

## 2019-11-12 ENCOUNTER — APPOINTMENT (OUTPATIENT)
Dept: LAB | Facility: CLINIC | Age: 64
End: 2019-11-12
Payer: MEDICARE

## 2019-11-12 DIAGNOSIS — Z12.5 PROSTATE CANCER SCREENING: ICD-10-CM

## 2019-11-12 LAB — PSA SERPL-MCNC: 0.4 NG/ML (ref 0–4)

## 2019-11-12 PROCEDURE — 36415 COLL VENOUS BLD VENIPUNCTURE: CPT

## 2019-11-12 PROCEDURE — G0103 PSA SCREENING: HCPCS

## 2019-11-18 NOTE — PROGRESS NOTES
Assessment and plan:       1  BPH with lower urinary tract symptoms - managed by Dr Starr Coronel  - Continue tamsulosin monotherapy  Patient has increased dose to 0 8 mg daily  - We discussed addition of finasteride or workup for bladder outlet obstruction procedures  He is not interested in further management at this time  In the future he would only be interested in finasteride as he would like to avoid surgical intervention   - Side effect profile of tamsulosin was again reviewed as he is doubling the dose and this can lead to an increased prevalence of side effects   - we did discuss dietary and behavioral modifications to improve his nocturia which seems to be the most bothersome  He reports that he does not snore  He also reports that is difficult for him to urinate sitting down      2  Erectile dysfunction  - Unable to achieve fully rigid erection with sildenafil 100 mg   - We discussed further intervention with vacuum assisted device, intraurethral suppositories, and intracavernosal injections  Patient does not wish to pursue any of these modalities because he feels like it removed the spontaneity   - I encouraged patient to increase cardiovascular exercise and have weight loss  - He will continue with sildenafil 100 mg as needed  - A refill of this prescription was sent to his preferred pharmacy      3  Routine prostate cancer screening  - PSA within normal limits of 0 4    - Digital rectal exam remains benign  Follow up in one year with PSA prior to visit for symptom reassessment and routine prostate cancer screening  Tangela Jim PA-C      Chief Complaint     Chief Complaint   Patient presents with    prostate caner screening         History of Present Illness     Angely Collado is a 60 y o  male patient known to Dr Starr Coronel for history of BPH with lower urinary tract symptoms presenting for follow-up  He continues to take tamsulosin with a good result in his symptoms    He did recently increased his dose from 1 tab to 2 tabs daily  He continues to feel comfortable with his urination  He reports a fair urinary stream, feels empty after urination, and continues to have nocturia 5 times nightly  He also continues to deny any dysuria, gross hematuria, suprapubic pressure, or history of urinary tract infections  Patient additionally has a history of erectile dysfunction  For this he takes sildenafil 100 mg and is able to achieve an approximately 70% erection which was not significant for penetration  Addition of a vacuum assisted device, intraurethral suppository, and intracavernosal injections were discussed with the patient  He was not interested in pursuing these modalities  PSA drawn 11/12/2019 remains stable at 0 4  Laboratory     Lab Results   Component Value Date    CREATININE 1 29 10/21/2019       Lab Results   Component Value Date    PSA 0 4 11/12/2019       No results found for this or any previous visit (from the past 1 hour(s))  Review of Systems     Review of Systems   Constitutional: Negative for chills and fever  HENT: Negative  Eyes: Negative  Respiratory: Negative for shortness of breath  Cardiovascular: Negative for chest pain  Gastrointestinal: Negative for constipation, diarrhea, nausea and vomiting  Genitourinary: Positive for difficulty urinating, frequency and urgency  Negative for dysuria, enuresis, flank pain and hematuria  Musculoskeletal: Negative  AUA SYMPTOM SCORE      Most Recent Value   AUA SYMPTOM SCORE   How often have you had a sensation of not emptying your bladder completely after you finished urinating? 1   How often have you had to urinate again less than two hours after you finished urinating? 0   How often have you found you stopped and started again several times when you urinate? 2   How often have you found it difficult to postpone urination?   0   How often have you had a weak urinary stream?  4 How often have you had to push or strain to begin urination? 4   How many times did you most typically get up to urinate from the time you went to bed at night until the time you got up in the morning? 5   Quality of Life: If you were to spend the rest of your life with your urinary condition just the way it is now, how would you feel about that?  2   AUA SYMPTOM SCORE  16                    Allergies     Allergies   Allergen Reactions    Iodinated Diagnostic Agents Anaphylaxis    Iodine Anaphylaxis    Trilipix [Choline Fenofibrate]      Other reaction(s): jaundice  Other reaction(s): jaundice    Atorvastatin     Iodine Solution [Povidone Iodine]     Shellfish Allergy        Physical Exam     Physical Exam   Constitutional: He is oriented to person, place, and time  He appears well-developed and well-nourished  No distress  HENT:   Head: Normocephalic and atraumatic  Right Ear: External ear normal    Left Ear: External ear normal    Nose: Nose normal    Eyes: Right eye exhibits no discharge  Left eye exhibits no discharge  No scleral icterus  Cardiovascular: Normal rate  Pulmonary/Chest: Effort normal    Genitourinary:   Genitourinary Comments: Digital rectal exam reveals an approximately 35 g gland that is smooth, nontender, and without nodules  Musculoskeletal:   Ambulates independently   Neurological: He is alert and oriented to person, place, and time  Skin: Skin is warm and dry  He is not diaphoretic  Psychiatric: He has a normal mood and affect  His behavior is normal  Judgment and thought content normal    Nursing note and vitals reviewed          Vital Signs     Vitals:    11/19/19 1015   BP: 130/76   Pulse: 76   Weight: 98 kg (216 lb)   Height: 5' 9" (1 753 m)         Current Medications       Current Outpatient Medications:     acyclovir (ZOVIRAX) 400 MG tablet, Take 400 mg by mouth daily, Disp: , Rfl: 5    ALPRAZolam (XANAX) 1 mg tablet, 1 mg 2 (two) times a day as needed, Disp: , Rfl: 0    aspirin (ECOTRIN LOW STRENGTH) 81 mg EC tablet, Take 81 mg by mouth daily, Disp: , Rfl:     buPROPion (WELLBUTRIN XL) 150 mg 24 hr tablet, Take 450 mg by mouth daily  , Disp: , Rfl: 4    cholecalciferol (VITAMIN D3) 1,000 units tablet, Take 1,000 Units by mouth daily, Disp: , Rfl:     cyanocobalamin (VITAMIN B-12) 1,000 mcg tablet, Take by mouth daily, Disp: , Rfl:     glimepiride (AMARYL) 1 mg tablet, Take 1 mg by mouth every morning before breakfast, Disp: , Rfl:     JANUMET XR  MG TB24, Take 2 tablets by mouth every evening, Disp: , Rfl: 2    lenalidomide (REVLIMID) 5 MG CAPS, Take 5 mg by mouth daily, Disp: , Rfl:     losartan (COZAAR) 25 mg tablet, Take 25 mg by mouth daily, Disp: , Rfl: 1    metoprolol tartrate (LOPRESSOR) 25 mg tablet, Take 25 mg by mouth every 12 (twelve) hours, Disp: , Rfl:     nitroglycerin (NITROSTAT) 0 4 mg SL tablet, PLACE 1 TABLET UNDER TONGUE EVERY 5 MINS 3 TIMES IF NEEDED FOR CHEST PAIN, Disp: , Rfl: 0    omeprazole (PriLOSEC) 20 mg delayed release capsule, Take 20 mg by mouth daily, Disp: , Rfl:     rosuvastatin (CRESTOR) 5 mg tablet, Take 5 mg by mouth daily, Disp: , Rfl:     tamsulosin (FLOMAX) 0 4 mg, Take 2 capsules (0 8 mg total) by mouth daily with dinner, Disp: 180 capsule, Rfl: 3    zoledronic acid (ZOMETA) 4 mg/100 mL, Infuse 4 mg into a venous catheter once, Disp: , Rfl:     ALPRAZolam (XANAX) 2 MG tablet, Take by mouth, Disp: , Rfl:     chlorhexidine (PERIDEX) 0 12 % solution, RINSE FOR 60 SECONDS AND EXPECTORATE TWICE DAILY, Disp: , Rfl: 2    ketoconazole (NIZORAL) 2 % cream, BETWEEN ALL TOES as needed, Disp: , Rfl: 2    sildenafil (REVATIO) 20 mg tablet, Take 1 tablet (20 mg total) by mouth 3 (three) times a day Take 2-4 tablets as needed, Disp: 90 tablet, Rfl: 6    traZODone (DESYREL) 50 mg tablet, 50 mg daily at bedtime as needed for sleep, Disp: , Rfl: 1      Active Problems     Patient Active Problem List   Diagnosis    Benign prostatic hyperplasia    ED (erectile dysfunction)    Screening for colon cancer    Hypertension    Multiple myeloma (HCC)    [facility] disorder[facility] full [facility]    [facility]    Neuropathy    Hyperlipidemia    Oropharyngeal dysphagia         Past Medical History     Past Medical History:   Diagnosis Date    Back problem     [facility] )     History of depression     Hyperlipidemia     Hypertension     [facility] myeloma ([facility] )     Myocardial infarction [facility])          Surgical History     Past Surgical History:   Procedure Laterality Date    CARDIAC CATHETERIZATION      HERNIA REPAIR      TN COLONOSCOPY FLX DX W/COLLJ SPEC WHEN PFRMD N/A 11/16/2018    Procedure: COLONOSCOPY;  Surgeon: Lena Ramos MD;  Location: [facility];   Service: Gastroenterology    TONSILLECTOMY      TONSILLECTOMY           Family History     Family History   Problem Relation Age of Onset    Heart disease Father     Diabetes Brother          Social History     Social History       Radiology

## 2019-11-19 ENCOUNTER — OFFICE VISIT (OUTPATIENT)
Dept: UROLOGY | Facility: CLINIC | Age: 64
End: 2019-11-19
Payer: MEDICARE

## 2019-11-19 VITALS
SYSTOLIC BLOOD PRESSURE: 130 MMHG | HEART RATE: 76 BPM | HEIGHT: 69 IN | DIASTOLIC BLOOD PRESSURE: 76 MMHG | BODY MASS INDEX: 31.99 KG/M2 | WEIGHT: 216 LBS

## 2019-11-19 DIAGNOSIS — N40.0 BENIGN PROSTATIC HYPERPLASIA, UNSPECIFIED WHETHER LOWER URINARY TRACT SYMPTOMS PRESENT: ICD-10-CM

## 2019-11-19 DIAGNOSIS — N52.9 ERECTILE DYSFUNCTION, UNSPECIFIED ERECTILE DYSFUNCTION TYPE: Primary | ICD-10-CM

## 2019-11-19 DIAGNOSIS — Z12.5 PROSTATE CANCER SCREENING: ICD-10-CM

## 2019-11-19 PROCEDURE — 99214 OFFICE O/P EST MOD 30 MIN: CPT | Performed by: PHYSICIAN ASSISTANT

## 2019-11-19 RX ORDER — TAMSULOSIN HYDROCHLORIDE 0.4 MG/1
0.8 CAPSULE ORAL
Qty: 180 CAPSULE | Refills: 3 | Status: SHIPPED | OUTPATIENT
Start: 2019-11-19 | End: 2020-11-04

## 2019-11-19 RX ORDER — SILDENAFIL CITRATE 20 MG/1
20 TABLET ORAL 3 TIMES DAILY
Qty: 90 TABLET | Refills: 6 | Status: SHIPPED | OUTPATIENT
Start: 2019-11-19 | End: 2021-08-16 | Stop reason: ALTCHOICE

## 2019-11-26 ENCOUNTER — APPOINTMENT (OUTPATIENT)
Dept: LAB | Facility: CLINIC | Age: 64
End: 2019-11-26
Payer: MEDICARE

## 2019-11-26 ENCOUNTER — TRANSCRIBE ORDERS (OUTPATIENT)
Dept: ADMINISTRATIVE | Facility: HOSPITAL | Age: 64
End: 2019-11-26

## 2019-11-26 DIAGNOSIS — E85.9 MYELOMA ASSOCIATED AMYLOIDOSIS (HCC): ICD-10-CM

## 2019-11-26 DIAGNOSIS — E85.9 MYELOMA ASSOCIATED AMYLOIDOSIS (HCC): Primary | ICD-10-CM

## 2019-11-26 DIAGNOSIS — C90.00 MYELOMA ASSOCIATED AMYLOIDOSIS (HCC): ICD-10-CM

## 2019-11-26 DIAGNOSIS — C90.00 MYELOMA ASSOCIATED AMYLOIDOSIS (HCC): Primary | ICD-10-CM

## 2019-11-26 LAB
BASOPHILS # BLD AUTO: 0.02 THOUSANDS/ΜL (ref 0–0.1)
BASOPHILS NFR BLD AUTO: 1 % (ref 0–1)
EOSINOPHIL # BLD AUTO: 0.26 THOUSAND/ΜL (ref 0–0.61)
EOSINOPHIL NFR BLD AUTO: 7 % (ref 0–6)
ERYTHROCYTE [DISTWIDTH] IN BLOOD BY AUTOMATED COUNT: 19.6 % (ref 11.6–15.1)
HCT VFR BLD AUTO: 30.5 % (ref 36.5–49.3)
HGB BLD-MCNC: 9.3 G/DL (ref 12–17)
IMM GRANULOCYTES # BLD AUTO: 0.02 THOUSAND/UL (ref 0–0.2)
IMM GRANULOCYTES NFR BLD AUTO: 1 % (ref 0–2)
LYMPHOCYTES # BLD AUTO: 1.38 THOUSANDS/ΜL (ref 0.6–4.47)
LYMPHOCYTES NFR BLD AUTO: 38 % (ref 14–44)
MCH RBC QN AUTO: 24.1 PG (ref 26.8–34.3)
MCHC RBC AUTO-ENTMCNC: 30.5 G/DL (ref 31.4–37.4)
MCV RBC AUTO: 79 FL (ref 82–98)
MONOCYTES # BLD AUTO: 0.3 THOUSAND/ΜL (ref 0.17–1.22)
MONOCYTES NFR BLD AUTO: 8 % (ref 4–12)
NEUTROPHILS # BLD AUTO: 1.67 THOUSANDS/ΜL (ref 1.85–7.62)
NEUTS SEG NFR BLD AUTO: 45 % (ref 43–75)
NRBC BLD AUTO-RTO: 1 /100 WBCS
PLATELET # BLD AUTO: 58 THOUSANDS/UL (ref 149–390)
RBC # BLD AUTO: 3.86 MILLION/UL (ref 3.88–5.62)
WBC # BLD AUTO: 3.65 THOUSAND/UL (ref 4.31–10.16)

## 2019-11-26 PROCEDURE — 85025 COMPLETE CBC W/AUTO DIFF WBC: CPT

## 2019-11-26 PROCEDURE — 36415 COLL VENOUS BLD VENIPUNCTURE: CPT

## 2020-01-02 ENCOUNTER — TRANSCRIBE ORDERS (OUTPATIENT)
Dept: ADMINISTRATIVE | Facility: HOSPITAL | Age: 65
End: 2020-01-02

## 2020-01-02 ENCOUNTER — APPOINTMENT (OUTPATIENT)
Dept: LAB | Facility: CLINIC | Age: 65
End: 2020-01-02
Payer: MEDICARE

## 2020-01-02 DIAGNOSIS — I11.9 MALIGNANT HYPERTENSIVE HEART DISEASE WITHOUT HEART FAILURE: ICD-10-CM

## 2020-01-02 DIAGNOSIS — E78.5 HYPERLIPIDEMIA, UNSPECIFIED HYPERLIPIDEMIA TYPE: Primary | ICD-10-CM

## 2020-01-02 DIAGNOSIS — E78.5 HYPERLIPIDEMIA, UNSPECIFIED HYPERLIPIDEMIA TYPE: ICD-10-CM

## 2020-01-02 LAB
ALT SERPL W P-5'-P-CCNC: 43 U/L (ref 12–78)
AST SERPL W P-5'-P-CCNC: 21 U/L (ref 5–45)
CHOLEST SERPL-MCNC: 98 MG/DL (ref 50–200)
HDLC SERPL-MCNC: 33 MG/DL
LDLC SERPL CALC-MCNC: 35 MG/DL (ref 0–100)
NONHDLC SERPL-MCNC: 65 MG/DL
TRIGL SERPL-MCNC: 151 MG/DL

## 2020-01-02 PROCEDURE — 36415 COLL VENOUS BLD VENIPUNCTURE: CPT

## 2020-01-02 PROCEDURE — 84450 TRANSFERASE (AST) (SGOT): CPT

## 2020-01-02 PROCEDURE — 80061 LIPID PANEL: CPT

## 2020-01-02 PROCEDURE — 84460 ALANINE AMINO (ALT) (SGPT): CPT

## 2020-03-24 ENCOUNTER — TRANSCRIBE ORDERS (OUTPATIENT)
Dept: LAB | Facility: CLINIC | Age: 65
End: 2020-03-24

## 2020-03-24 ENCOUNTER — APPOINTMENT (OUTPATIENT)
Dept: LAB | Facility: CLINIC | Age: 65
End: 2020-03-24
Payer: MEDICARE

## 2020-03-24 DIAGNOSIS — E11.649 UNCONTROLLED TYPE 2 DIABETES MELLITUS WITH HYPOGLYCEMIA, UNSPECIFIED HYPOGLYCEMIA COMA STATUS (HCC): Primary | ICD-10-CM

## 2020-03-24 DIAGNOSIS — E11.649 UNCONTROLLED TYPE 2 DIABETES MELLITUS WITH HYPOGLYCEMIA, UNSPECIFIED HYPOGLYCEMIA COMA STATUS (HCC): ICD-10-CM

## 2020-03-24 LAB
ALBUMIN SERPL BCP-MCNC: 3.8 G/DL (ref 3.5–5)
ALP SERPL-CCNC: 114 U/L (ref 46–116)
ALT SERPL W P-5'-P-CCNC: 57 U/L (ref 12–78)
ANION GAP SERPL CALCULATED.3IONS-SCNC: 7 MMOL/L (ref 4–13)
AST SERPL W P-5'-P-CCNC: 28 U/L (ref 5–45)
BILIRUB SERPL-MCNC: 1.04 MG/DL (ref 0.2–1)
BUN SERPL-MCNC: 20 MG/DL (ref 5–25)
CALCIUM SERPL-MCNC: 8.8 MG/DL (ref 8.3–10.1)
CHLORIDE SERPL-SCNC: 102 MMOL/L (ref 100–108)
CHOLEST SERPL-MCNC: 105 MG/DL (ref 50–200)
CO2 SERPL-SCNC: 27 MMOL/L (ref 21–32)
CREAT SERPL-MCNC: 1.45 MG/DL (ref 0.6–1.3)
EST. AVERAGE GLUCOSE BLD GHB EST-MCNC: 137 MG/DL
GFR SERPL CREATININE-BSD FRML MDRD: 51 ML/MIN/1.73SQ M
GLUCOSE P FAST SERPL-MCNC: 188 MG/DL (ref 65–99)
HBA1C MFR BLD: 6.4 %
HDLC SERPL-MCNC: 36 MG/DL
LDLC SERPL CALC-MCNC: 16 MG/DL (ref 0–100)
NONHDLC SERPL-MCNC: 69 MG/DL
POTASSIUM SERPL-SCNC: 4.5 MMOL/L (ref 3.5–5.3)
PROT SERPL-MCNC: 6.7 G/DL (ref 6.4–8.2)
SODIUM SERPL-SCNC: 136 MMOL/L (ref 136–145)
TRIGL SERPL-MCNC: 266 MG/DL

## 2020-03-24 PROCEDURE — 36415 COLL VENOUS BLD VENIPUNCTURE: CPT

## 2020-03-24 PROCEDURE — 83036 HEMOGLOBIN GLYCOSYLATED A1C: CPT

## 2020-03-24 PROCEDURE — 80061 LIPID PANEL: CPT

## 2020-03-24 PROCEDURE — 80053 COMPREHEN METABOLIC PANEL: CPT

## 2020-07-06 ENCOUNTER — TRANSCRIBE ORDERS (OUTPATIENT)
Dept: LAB | Facility: CLINIC | Age: 65
End: 2020-07-06

## 2020-07-06 ENCOUNTER — APPOINTMENT (OUTPATIENT)
Dept: LAB | Facility: CLINIC | Age: 65
End: 2020-07-06
Payer: MEDICARE

## 2020-07-06 DIAGNOSIS — R06.02 SHORTNESS OF BREATH: ICD-10-CM

## 2020-07-06 DIAGNOSIS — E78.2 MIXED HYPERLIPIDEMIA: ICD-10-CM

## 2020-07-06 DIAGNOSIS — R53.82 CHRONIC FATIGUE: ICD-10-CM

## 2020-07-06 DIAGNOSIS — E78.2 MIXED HYPERLIPIDEMIA: Primary | ICD-10-CM

## 2020-07-06 DIAGNOSIS — I10 ESSENTIAL HYPERTENSION: ICD-10-CM

## 2020-07-06 LAB
ALT SERPL W P-5'-P-CCNC: 49 U/L (ref 12–78)
ANION GAP SERPL CALCULATED.3IONS-SCNC: 7 MMOL/L (ref 4–13)
AST SERPL W P-5'-P-CCNC: 26 U/L (ref 5–45)
BUN SERPL-MCNC: 16 MG/DL (ref 5–25)
CALCIUM SERPL-MCNC: 8.9 MG/DL (ref 8.3–10.1)
CHLORIDE SERPL-SCNC: 107 MMOL/L (ref 100–108)
CHOLEST SERPL-MCNC: 87 MG/DL (ref 50–200)
CO2 SERPL-SCNC: 25 MMOL/L (ref 21–32)
CREAT SERPL-MCNC: 1.22 MG/DL (ref 0.6–1.3)
GFR SERPL CREATININE-BSD FRML MDRD: 63 ML/MIN/1.73SQ M
GLUCOSE P FAST SERPL-MCNC: 172 MG/DL (ref 65–99)
HDLC SERPL-MCNC: 37 MG/DL
LDLC SERPL CALC-MCNC: 19 MG/DL (ref 0–100)
NONHDLC SERPL-MCNC: 50 MG/DL
POTASSIUM SERPL-SCNC: 4.8 MMOL/L (ref 3.5–5.3)
SODIUM SERPL-SCNC: 139 MMOL/L (ref 136–145)
TRIGL SERPL-MCNC: 157 MG/DL

## 2020-07-06 PROCEDURE — 84460 ALANINE AMINO (ALT) (SGPT): CPT

## 2020-07-06 PROCEDURE — 36415 COLL VENOUS BLD VENIPUNCTURE: CPT

## 2020-07-06 PROCEDURE — 80061 LIPID PANEL: CPT

## 2020-07-06 PROCEDURE — 80048 BASIC METABOLIC PNL TOTAL CA: CPT

## 2020-07-06 PROCEDURE — 84450 TRANSFERASE (AST) (SGOT): CPT

## 2020-10-06 ENCOUNTER — OFFICE VISIT (OUTPATIENT)
Dept: OBGYN CLINIC | Facility: CLINIC | Age: 65
End: 2020-10-06
Payer: MEDICARE

## 2020-10-06 VITALS
TEMPERATURE: 98.1 F | DIASTOLIC BLOOD PRESSURE: 78 MMHG | BODY MASS INDEX: 31.1 KG/M2 | SYSTOLIC BLOOD PRESSURE: 125 MMHG | HEIGHT: 69 IN | HEART RATE: 70 BPM | WEIGHT: 210 LBS

## 2020-10-06 DIAGNOSIS — M72.0 DUPUYTREN'S CONTRACTURE OF LEFT HAND: Primary | ICD-10-CM

## 2020-10-06 PROCEDURE — 99203 OFFICE O/P NEW LOW 30 MIN: CPT | Performed by: ORTHOPAEDIC SURGERY

## 2020-10-06 RX ORDER — ICOSAPENT ETHYL 1000 MG/1
2 CAPSULE ORAL 2 TIMES DAILY
COMMUNITY
Start: 2020-09-02

## 2020-10-06 RX ORDER — RIVAROXABAN 2.5 MG/1
2.5 TABLET, FILM COATED ORAL 2 TIMES DAILY
COMMUNITY
Start: 2020-07-16

## 2020-10-06 RX ORDER — BUPROPION HYDROCHLORIDE 100 MG/1
TABLET, EXTENDED RELEASE ORAL
COMMUNITY
Start: 2020-08-01 | End: 2021-05-25

## 2020-10-06 RX ORDER — GABAPENTIN 300 MG/1
CAPSULE ORAL
COMMUNITY
Start: 2020-10-05 | End: 2021-08-16 | Stop reason: ALTCHOICE

## 2020-10-06 RX ORDER — COLESEVELAM 180 1/1
1250 TABLET ORAL 2 TIMES DAILY PRN
COMMUNITY
Start: 2020-07-21

## 2020-10-06 RX ORDER — EMPAGLIFLOZIN 10 MG/1
TABLET, FILM COATED ORAL
COMMUNITY
Start: 2020-08-31 | End: 2021-04-14

## 2020-10-06 RX ORDER — DULOXETIN HYDROCHLORIDE 30 MG/1
30 CAPSULE, DELAYED RELEASE ORAL DAILY
COMMUNITY
Start: 2020-10-02 | End: 2021-08-16 | Stop reason: DRUGHIGH

## 2020-11-04 DIAGNOSIS — N40.0 BENIGN PROSTATIC HYPERPLASIA, UNSPECIFIED WHETHER LOWER URINARY TRACT SYMPTOMS PRESENT: ICD-10-CM

## 2020-11-04 RX ORDER — TAMSULOSIN HYDROCHLORIDE 0.4 MG/1
0.8 CAPSULE ORAL
Qty: 180 CAPSULE | Refills: 3 | Status: SHIPPED | OUTPATIENT
Start: 2020-11-04 | End: 2021-08-20 | Stop reason: HOSPADM

## 2021-01-14 ENCOUNTER — TRANSCRIBE ORDERS (OUTPATIENT)
Dept: LAB | Facility: CLINIC | Age: 66
End: 2021-01-14

## 2021-01-14 ENCOUNTER — LAB (OUTPATIENT)
Dept: LAB | Facility: CLINIC | Age: 66
End: 2021-01-14
Payer: MEDICARE

## 2021-01-14 DIAGNOSIS — E55.9 VITAMIN D DEFICIENCY: ICD-10-CM

## 2021-01-14 DIAGNOSIS — E11.65 UNCONTROLLED TYPE 2 DIABETES MELLITUS WITH HYPERGLYCEMIA (HCC): ICD-10-CM

## 2021-01-14 DIAGNOSIS — Z12.5 PROSTATE CANCER SCREENING: ICD-10-CM

## 2021-01-14 DIAGNOSIS — E78.2 MIXED HYPERLIPIDEMIA: ICD-10-CM

## 2021-01-14 DIAGNOSIS — N18.30 STAGE 3 CHRONIC KIDNEY DISEASE, UNSPECIFIED WHETHER STAGE 3A OR 3B CKD (HCC): ICD-10-CM

## 2021-01-14 DIAGNOSIS — I10 HYPERTENSION, ESSENTIAL: ICD-10-CM

## 2021-01-14 DIAGNOSIS — G62.9 NEUROPATHY: ICD-10-CM

## 2021-01-14 DIAGNOSIS — E78.5 HYPERLIPIDEMIA, UNSPECIFIED HYPERLIPIDEMIA TYPE: Primary | ICD-10-CM

## 2021-01-14 DIAGNOSIS — I25.10 DISEASE OF CARDIOVASCULAR SYSTEM: ICD-10-CM

## 2021-01-14 DIAGNOSIS — E53.8 VITAMIN B 12 DEFICIENCY: ICD-10-CM

## 2021-01-14 DIAGNOSIS — E78.5 HYPERLIPIDEMIA, UNSPECIFIED HYPERLIPIDEMIA TYPE: ICD-10-CM

## 2021-01-14 DIAGNOSIS — E11.69 TYPE II DIABETES MELLITUS WITH COMA (HCC): ICD-10-CM

## 2021-01-14 DIAGNOSIS — E53.8 VITAMIN B 12 DEFICIENCY: Primary | ICD-10-CM

## 2021-01-14 LAB
25(OH)D3 SERPL-MCNC: 49 NG/ML (ref 30–100)
ALBUMIN SERPL BCP-MCNC: 3.8 G/DL (ref 3.5–5)
ALP SERPL-CCNC: 131 U/L (ref 46–116)
ALT SERPL W P-5'-P-CCNC: 48 U/L (ref 12–78)
ANION GAP SERPL CALCULATED.3IONS-SCNC: 5 MMOL/L (ref 4–13)
AST SERPL W P-5'-P-CCNC: 24 U/L (ref 5–45)
BILIRUB SERPL-MCNC: 1.07 MG/DL (ref 0.2–1)
BUN SERPL-MCNC: 18 MG/DL (ref 5–25)
CALCIUM SERPL-MCNC: 9.4 MG/DL (ref 8.3–10.1)
CHLORIDE SERPL-SCNC: 105 MMOL/L (ref 100–108)
CHOLEST SERPL-MCNC: 102 MG/DL (ref 50–200)
CO2 SERPL-SCNC: 28 MMOL/L (ref 21–32)
CREAT SERPL-MCNC: 1.57 MG/DL (ref 0.6–1.3)
CREAT UR-MCNC: 166 MG/DL
EST. AVERAGE GLUCOSE BLD GHB EST-MCNC: 131 MG/DL
GFR SERPL CREATININE-BSD FRML MDRD: 46 ML/MIN/1.73SQ M
GLUCOSE P FAST SERPL-MCNC: 157 MG/DL (ref 65–99)
HBA1C MFR BLD: 6.2 %
HDLC SERPL-MCNC: 39 MG/DL
INSULIN SERPL-ACNC: 10.2 MU/L (ref 3–25)
LDLC SERPL CALC-MCNC: 28 MG/DL (ref 0–100)
MICROALBUMIN UR-MCNC: 27.6 MG/L (ref 0–20)
MICROALBUMIN/CREAT 24H UR: 17 MG/G CREATININE (ref 0–30)
NONHDLC SERPL-MCNC: 63 MG/DL
POTASSIUM SERPL-SCNC: 4.2 MMOL/L (ref 3.5–5.3)
PROT SERPL-MCNC: 6.8 G/DL (ref 6.4–8.2)
PSA SERPL-MCNC: 0.4 NG/ML (ref 0–4)
SODIUM SERPL-SCNC: 138 MMOL/L (ref 136–145)
TRIGL SERPL-MCNC: 173 MG/DL
VIT B12 SERPL-MCNC: 684 PG/ML (ref 100–900)

## 2021-01-14 PROCEDURE — 36415 COLL VENOUS BLD VENIPUNCTURE: CPT

## 2021-01-14 PROCEDURE — 82043 UR ALBUMIN QUANTITATIVE: CPT | Performed by: NURSE PRACTITIONER

## 2021-01-14 PROCEDURE — 82570 ASSAY OF URINE CREATININE: CPT | Performed by: NURSE PRACTITIONER

## 2021-01-14 PROCEDURE — 82306 VITAMIN D 25 HYDROXY: CPT

## 2021-01-14 PROCEDURE — 80053 COMPREHEN METABOLIC PANEL: CPT

## 2021-01-14 PROCEDURE — 83036 HEMOGLOBIN GLYCOSYLATED A1C: CPT

## 2021-01-14 PROCEDURE — 80061 LIPID PANEL: CPT

## 2021-01-14 PROCEDURE — 82607 VITAMIN B-12: CPT

## 2021-01-14 PROCEDURE — 83525 ASSAY OF INSULIN: CPT

## 2021-01-14 PROCEDURE — G0103 PSA SCREENING: HCPCS

## 2021-01-15 ENCOUNTER — TRANSCRIBE ORDERS (OUTPATIENT)
Dept: LAB | Facility: HOSPITAL | Age: 66
End: 2021-01-15

## 2021-01-15 DIAGNOSIS — G62.9 NEUROPATHY: Primary | ICD-10-CM

## 2021-01-18 ENCOUNTER — LAB (OUTPATIENT)
Dept: LAB | Facility: CLINIC | Age: 66
End: 2021-01-18
Payer: MEDICARE

## 2021-01-18 DIAGNOSIS — G62.9 NEUROPATHY: ICD-10-CM

## 2021-01-18 PROCEDURE — 82542 COL CHROMOTOGRAPHY QUAL/QUAN: CPT

## 2021-01-21 ENCOUNTER — OFFICE VISIT (OUTPATIENT)
Dept: UROLOGY | Facility: CLINIC | Age: 66
End: 2021-01-21
Payer: MEDICARE

## 2021-01-21 VITALS
DIASTOLIC BLOOD PRESSURE: 82 MMHG | BODY MASS INDEX: 31.4 KG/M2 | HEIGHT: 69 IN | WEIGHT: 212 LBS | SYSTOLIC BLOOD PRESSURE: 130 MMHG | HEART RATE: 79 BPM

## 2021-01-21 DIAGNOSIS — N40.0 BENIGN PROSTATIC HYPERPLASIA, UNSPECIFIED WHETHER LOWER URINARY TRACT SYMPTOMS PRESENT: Primary | ICD-10-CM

## 2021-01-21 LAB — POST-VOID RESIDUAL VOLUME, ML POC: 70 ML

## 2021-01-21 PROCEDURE — 99213 OFFICE O/P EST LOW 20 MIN: CPT | Performed by: PHYSICIAN ASSISTANT

## 2021-01-21 PROCEDURE — 51798 US URINE CAPACITY MEASURE: CPT | Performed by: PHYSICIAN ASSISTANT

## 2021-01-21 NOTE — PROGRESS NOTES
1  Benign prostatic hyperplasia, unspecified whether lower urinary tract symptoms present  POCT Measure PVR       Assessment and plan:       1  BPH with lower urinary tract symptoms - managed by Dr Maxine Reid  - Continue tamsulosin twice daily  - LUTS persistent and bothersome to patient  Will coordinate cystoscopy and TRUS for further evaluation to see if candidate for urolift versus TURP  - dietary and behavioral modifications advised         2  Erectile dysfunction  -  patient denies adequate response to oral PDE 5 inhibitors  - discuss intracavernosal injections  Patient reports he has other medical conditions at this time as she wishes to further evaluate  Was encouraged to contact us should he wish to pursue intracavernosal injection therapy     3  Routine prostate cancer screening  - PSA within normal limits of 0 4    - Digital rectal exam remains benign  Hernandez Goetz PA-C      Chief Complaint     LUTS     History of Present Illness     Sierra Us is a 63 y o  male patient known to Dr Maxine Reid for history of BPH with lower urinary tract symptoms presenting for follow-up  He continues to take tamsulosin twice daily dosing with a good result in his symptoms  He continues to feel comfortable with his urination  He reports a fair urinary stream, feels empty after urination, and continues to have nocturia 5 times nightly  Reports frequency [id number] during the daytime  He also continues to deny any dysuria, gross hematuria, suprapubic pressure, or history of urinary tract infections  Patient has previously been offered cystoscopy and transrectal ultrasound for further workup however declines any surgical intervention  Patient has tried maximum strength oral PDE5 inhibitors without much erectile response  Reports good libido, but minimal rigidity not sufficient for penetration  Most recent PSA remains stable at 0 4 (01/14/2021)      PVR 70mL  AUA SYMPTOM SCORE      Most Recent Value AUA SYMPTOM SCORE   How often have you had a sensation of not emptying your bladder completely after you finished urinating? 1   How often have you had to urinate again less than two hours after you finished urinating? 4   How often have you found you stopped and started again several times when you urinate? 5   How often have you found it difficult to postpone urination? 1   How often have you had a weak urinary stream?  5   How often have you had to push or strain to begin urination? 5   How many times did you most typically get up to urinate from the time you went to bed at night until the time you got up in the morning? 5   Quality of Life: If you were to spend the rest of your life with your urinary condition just the way it is now, how would you feel about that?  5   AUA SYMPTOM SCORE  26            Laboratory     Lab Results   Component Value Date    CREATININE 1 57 (H) 01/14/2021       Lab Results   Component Value Date    PSA 0 4 01/14/2021    PSA 0 4 11/12/2019       No results found for this or any previous visit (from the past 1 hour(s))  Review of Systems     Review of Systems   Constitutional: Negative for chills and fever  HENT: Negative  Eyes: Negative  Respiratory: Negative for shortness of breath  Cardiovascular: Negative for chest pain  Gastrointestinal: Negative for constipation, diarrhea, nausea and vomiting  Genitourinary: Positive for difficulty urinating, frequency and urgency  Negative for dysuria, enuresis, flank pain and hematuria  Musculoskeletal: Negative  Allergies     Allergies   Allergen Reactions    Iodinated Diagnostic Agents Anaphylaxis    Iodine Anaphylaxis    Trilipix [Choline Fenofibrate]      Other reaction(s): jaundice  Other reaction(s): jaundice    Atorvastatin     Iodine Solution [Povidone Iodine]     Shellfish Allergy        Physical Exam     Physical Exam  Vitals signs and nursing note reviewed     Constitutional:       General: He is not in acute distress  Appearance: He is well-developed  He is not diaphoretic  HENT:      Head: Normocephalic and atraumatic  Right Ear: External ear normal       Left Ear: External ear normal       Nose: Nose normal    Eyes:      General: No scleral icterus  Right eye: No discharge  Left eye: No discharge  Cardiovascular:      Rate and Rhythm: Normal rate  Pulmonary:      Effort: Pulmonary effort is normal    Genitourinary:     Comments: Digital rectal exam reveals an approximately 35 g gland that is smooth, nontender, and without nodules  Musculoskeletal:      Comments: Ambulates independently   Skin:     General: Skin is warm and dry  Neurological:      Mental Status: He is alert and oriented to person, place, and time  Psychiatric:         Behavior: Behavior normal          Thought Content: Thought content normal          Judgment: Judgment normal            Vital Signs     There were no vitals filed for this visit        Current Medications       Current Outpatient Medications:     acyclovir (ZOVIRAX) 400 MG tablet, Take 400 mg by mouth daily, Disp: , Rfl: 5    ALPRAZolam (XANAX) 1 mg tablet, 1 mg 2 (two) times a day as needed, Disp: , Rfl: 0    ALPRAZolam (XANAX) 2 MG tablet, Take by mouth, Disp: , Rfl:     aspirin (ECOTRIN LOW STRENGTH) 81 mg EC tablet, Take 81 mg by mouth daily, Disp: , Rfl:     buPROPion (WELLBUTRIN SR) 100 mg 12 hr tablet, TAKE 3 TABLETS DAILY IN THE MORNING, Disp: , Rfl:     buPROPion (WELLBUTRIN XL) 150 mg 24 hr tablet, Take 450 mg by mouth daily  , Disp: , Rfl: 4    chlorhexidine (PERIDEX) 0 12 % solution, RINSE FOR 60 SECONDS AND EXPECTORATE TWICE DAILY, Disp: , Rfl: 2    cholecalciferol (VITAMIN D3) 1,000 units tablet, Take 1,000 Units by mouth daily, Disp: , Rfl:     ciclopirox (LOPROX) 0 77 % cream, APPLY TOPICALLY TO AFFECTED AREAS ON THE GROIN 2 TIMES A DAY X 30 DAYS, Disp: , Rfl:     colesevelam (WELCHOL) 625 mg tablet, Take 1,250 mg by mouth 2 (two) times a day, Disp: , Rfl:     cyanocobalamin (VITAMIN B-12) 1,000 mcg tablet, Take by mouth daily, Disp: , Rfl:     DULoxetine (CYMBALTA) 30 mg delayed release capsule, , Disp: , Rfl:     Empagliflozin (Jardiance) 10 MG TABS, TAKE 1 TABLET EVERY DAY, Disp: , Rfl:     gabapentin (NEURONTIN) 300 mg capsule, , Disp: , Rfl:     glimepiride (AMARYL) 1 mg tablet, Take 1 mg by mouth every morning before breakfast, Disp: , Rfl:     JANUMET XR  MG TB24, Take 2 tablets by mouth every evening, Disp: , Rfl: 2    lenalidomide (REVLIMID) 5 MG CAPS, Take 5 mg by mouth daily, Disp: , Rfl:     losartan (COZAAR) 25 mg tablet, Take 25 mg by mouth daily, Disp: , Rfl: 1    metoprolol tartrate (LOPRESSOR) 25 mg tablet, Take 25 mg by mouth every 12 (twelve) hours, Disp: , Rfl:     nitroglycerin (NITROSTAT) 0 4 mg SL tablet, PLACE 1 TABLET UNDER TONGUE EVERY 5 MINS 3 TIMES IF NEEDED FOR CHEST PAIN, Disp: , Rfl: 0    omeprazole (PriLOSEC) 20 mg delayed release capsule, Take 20 mg by mouth daily, Disp: , Rfl:     rosuvastatin (CRESTOR) 5 mg tablet, Take 5 mg by mouth daily, Disp: , Rfl:     sildenafil (REVATIO) 20 mg tablet, Take 1 tablet (20 mg total) by mouth 3 (three) times a day Take 2-4 tablets as needed, Disp: 90 tablet, Rfl: 6    silver sulfadiazine (SILVADENE,SSD) 1 % cream, APPLY TOPICALLY TO AFFECTED AREAS ON THE GROIN 2 TIMES A DAY X 30 DAYS, Disp: , Rfl:     tamsulosin (FLOMAX) 0 4 mg, TAKE 2 CAPSULES (0 8 MG TOTAL) BY MOUTH DAILY WITH DINNER, Disp: 180 capsule, Rfl: 3    traZODone (DESYREL) 50 mg tablet, 50 mg daily at bedtime as needed for sleep, Disp: , Rfl: 1    Vascepa 1 g CAPS, Take 2 capsules by mouth 2 (two) times a day, Disp: , Rfl:     Xarelto 2 5 MG tablet, Take 2 5 mg by mouth 2 (two) times a day, Disp: , Rfl:     zoledronic acid (ZOMETA) 4 mg/100 mL, Infuse 4 mg into a venous catheter once, Disp: , Rfl:       Active Problems     Patient Active Problem List Diagnosis    Benign prostatic hyperplasia    ED (erectile dysfunction)    Screening for colon cancer    Hypertension    Multiple myeloma (HCC)    Recurrent major depressive disorder, in full remission [facility] )    [facility]    Neuropathy    Hyperlipidemia    Oropharyngeal dysphagia         Past Medical History     Past Medical History:   Diagnosis Date    Back problem     [facility] ([facility] )     History of depression     Hyperlipidemia     Hypertension     [facility] myeloma ([facility] )     [facility] infarction [facility])          Surgical History     Past Surgical History:   Procedure Laterality Date    CARDIAC CATHETERIZATION      HERNIA REPAIR      SC COLONOSCOPY FLX DX W/COLLJ SPEC WHEN PFRMD N/A 11/16/2018    Procedure: COLONOSCOPY;  Surgeon: Meaghan Ferraro MD;  Location: [facility];   Service: Gastroenterology    TONSILLECTOMY      TONSILLECTOMY           Family History     Family History   Problem Relation Age of Onset    Heart disease Father     Diabetes Brother          Social History     Social History       Radiology

## 2021-01-23 LAB — UBIQUINONE10 SERPL-MCNC: 0.53 UG/ML (ref 0.37–2.2)

## 2021-02-04 ENCOUNTER — TELEPHONE (OUTPATIENT)
Dept: UROLOGY | Facility: CLINIC | Age: 66
End: 2021-02-04

## 2021-02-16 ENCOUNTER — LAB (OUTPATIENT)
Dept: LAB | Facility: CLINIC | Age: 66
End: 2021-02-16
Payer: MEDICARE

## 2021-02-16 ENCOUNTER — TRANSCRIBE ORDERS (OUTPATIENT)
Dept: LAB | Facility: CLINIC | Age: 66
End: 2021-02-16

## 2021-02-16 DIAGNOSIS — R94.4 NONSPECIFIC ABNORMAL RESULTS OF KIDNEY FUNCTION STUDY: ICD-10-CM

## 2021-02-16 DIAGNOSIS — R94.4 NONSPECIFIC ABNORMAL RESULTS OF KIDNEY FUNCTION STUDY: Primary | ICD-10-CM

## 2021-02-16 LAB
ERYTHROCYTE [DISTWIDTH] IN BLOOD BY AUTOMATED COUNT: 19 % (ref 11.6–15.1)
HCT VFR BLD AUTO: 36.2 % (ref 36.5–49.3)
HGB BLD-MCNC: 11.1 G/DL (ref 12–17)
MCH RBC QN AUTO: 22.6 PG (ref 26.8–34.3)
MCHC RBC AUTO-ENTMCNC: 30.7 G/DL (ref 31.4–37.4)
MCV RBC AUTO: 74 FL (ref 82–98)
PLATELET # BLD AUTO: 72 THOUSANDS/UL (ref 149–390)
RBC # BLD AUTO: 4.91 MILLION/UL (ref 3.88–5.62)
WBC # BLD AUTO: 5.18 THOUSAND/UL (ref 4.31–10.16)

## 2021-02-16 PROCEDURE — 81001 URINALYSIS AUTO W/SCOPE: CPT | Performed by: INTERNAL MEDICINE

## 2021-02-16 PROCEDURE — 82570 ASSAY OF URINE CREATININE: CPT | Performed by: INTERNAL MEDICINE

## 2021-02-16 PROCEDURE — 84100 ASSAY OF PHOSPHORUS: CPT

## 2021-02-16 PROCEDURE — 84156 ASSAY OF PROTEIN URINE: CPT | Performed by: INTERNAL MEDICINE

## 2021-02-16 PROCEDURE — 80053 COMPREHEN METABOLIC PANEL: CPT

## 2021-02-16 PROCEDURE — 36415 COLL VENOUS BLD VENIPUNCTURE: CPT

## 2021-02-16 PROCEDURE — 85027 COMPLETE CBC AUTOMATED: CPT

## 2021-02-17 LAB
ALBUMIN SERPL BCP-MCNC: 3.8 G/DL (ref 3.5–5)
ALP SERPL-CCNC: 105 U/L (ref 46–116)
ALT SERPL W P-5'-P-CCNC: 30 U/L (ref 12–78)
ANION GAP SERPL CALCULATED.3IONS-SCNC: 5 MMOL/L (ref 4–13)
AST SERPL W P-5'-P-CCNC: 20 U/L (ref 5–45)
BACTERIA UR QL AUTO: ABNORMAL /HPF
BILIRUB SERPL-MCNC: 1.19 MG/DL (ref 0.2–1)
BILIRUB UR QL STRIP: NEGATIVE
BUN SERPL-MCNC: 15 MG/DL (ref 5–25)
CALCIUM SERPL-MCNC: 8.9 MG/DL (ref 8.3–10.1)
CHLORIDE SERPL-SCNC: 105 MMOL/L (ref 100–108)
CLARITY UR: CLEAR
CO2 SERPL-SCNC: 29 MMOL/L (ref 21–32)
COLOR UR: YELLOW
CREAT SERPL-MCNC: 1.16 MG/DL (ref 0.6–1.3)
CREAT UR-MCNC: 131 MG/DL
GFR SERPL CREATININE-BSD FRML MDRD: 67 ML/MIN/1.73SQ M
GLUCOSE SERPL-MCNC: 86 MG/DL (ref 65–140)
GLUCOSE UR STRIP-MCNC: ABNORMAL MG/DL
HGB UR QL STRIP.AUTO: NEGATIVE
HYALINE CASTS #/AREA URNS LPF: ABNORMAL /LPF
KETONES UR STRIP-MCNC: NEGATIVE MG/DL
LEUKOCYTE ESTERASE UR QL STRIP: ABNORMAL
NITRITE UR QL STRIP: NEGATIVE
NON-SQ EPI CELLS URNS QL MICRO: ABNORMAL /HPF
PH UR STRIP.AUTO: 6 [PH]
PHOSPHATE SERPL-MCNC: 2.9 MG/DL (ref 2.3–4.1)
POTASSIUM SERPL-SCNC: 4.2 MMOL/L (ref 3.5–5.3)
PROT SERPL-MCNC: 6.5 G/DL (ref 6.4–8.2)
PROT UR STRIP-MCNC: NEGATIVE MG/DL
PROT UR-MCNC: 24 MG/DL
PROT/CREAT UR: 0.18 MG/G{CREAT} (ref 0–0.1)
RBC #/AREA URNS AUTO: ABNORMAL /HPF
SODIUM SERPL-SCNC: 139 MMOL/L (ref 136–145)
SP GR UR STRIP.AUTO: 1.02 (ref 1–1.03)
UROBILINOGEN UR QL STRIP.AUTO: 1 E.U./DL
WBC #/AREA URNS AUTO: ABNORMAL /HPF

## 2021-03-30 DIAGNOSIS — Z23 ENCOUNTER FOR IMMUNIZATION: ICD-10-CM

## 2021-04-14 NOTE — PATIENT INSTRUCTIONS

## 2021-04-14 NOTE — PROGRESS NOTES
Problem List Items Addressed This Visit        Genitourinary    Urinary retention due to benign prostatic hyperplasia      Patient with a slow urinary stream, difficulty initiating his stream as well, cystoscopy shows a highly obstructed prostatic urethra, with a high tone bladder neck, his transrectal ultrasound shows a 40 15 gram gland    He is inclined to proceed with transurethral vaporization of prostate with a button electrode given his history of thrombocytopenia, multiple myeloma, and use of anticoagulant medications         Relevant Orders    Case request operating room: TRANSURETHRAL RESECTION OF PROSTATE (TURP) (Completed)    Other ejaculatory dysfunction     Patient with complaints of premature ejaculation, likely related to a peripheral neuropathy from his history of diabetes, I told him that this is not a condition that is easily treatable, however if he is to choose Tri Mix therapy in the future he would be expected to have erections sufficient for penetration that would be independent of his reaching climax            Other    ED (erectile dysfunction)      Patient with little response to Viagra, spoke with him about Tri Mix therapy and penile prosthesis placement, he does not believe that he would want a prosthesis, he may consider Tri Mix therapy in the future         [facility] ([facility] )      Patient on ongoing therapy with weekly infusions for his history of multiple myeloma, given history of blood/marrow dyscrasia, may be at increased risk for bleeding after outlet surgery         Encounter to discuss test results      Real-time review of his cystoscopic and transrectal ultrasound findings was performed with him and his wife today in the procedure room         Thrombocytopenia ([facility] )      The patient's platelet count is around 70 or so, no active bleeding at this time, I did tell him this will increase his risk of bleeding from any bladder outlet obstruction procedure           Other Visit Diagnoses     Benign prostatic hyperplasia, unspecified whether lower urinary tract symptoms present    -  Primary    Relevant Orders    POCT Measure PVR (Completed)                  Assessment and plan:       Please see problem oriented charting for the assessment plan of today's urological complaints       Umberto Mosley MD      Chief Complaint     Chief Complaint   Patient presents with    Benign Prostatic Hypertrophy    Cystoscopy     TRUS URO/PVR         History of Present Illness     Lisandro Garcia is a 63 y o  Man with troubles urinating, slow to initiate his urinary stream, intermittency, worsening incomplete bladder emptying with a PVR above 100 milliliters at this time, increased from 60-70 previously  Gets up at night 4-5 times, also on happy with his symptoms, currently on tamsulosin dual dose, he has wanted to avoid finasteride due to his comorbid erectile dysfunction  Also counseled him on erectile dysfunction options today to include penile prosthesis and Tri Mix, he will consider all options going forward but does not believe he would want a prosthesis placed  Endoscopic in cystoscopic workup today show him to be either a candidate for Uro lift or TURP or transurethral vaporization of prostate, I spoke with him about the potential risk of bladder neck contracture, as urethral stricture, incontinence, failure of the procedure, and the risks of infection, bleeding, pain, damage to surrounding structures, need for additional procedures, risk of failure of the procedure, risk of anesthesia, risk of positioning complications including neurapraxia, chronic pain, and paralysis as well as risk of rhabdomyolysis and compartment syndrome  Risk of deep venous thrombosis and venous thromboembolism as well as pneumonia and other potential unpredictable complications were also discussed with the patient       After this discussion he wishes to proceed with a transurethral vaporization of prostate which will have a lower chance of causing bleeding postoperatively    Detailed Urologic History     - please refer to HPI    Review of Systems     Review of Systems   Constitutional: Negative  HENT: Negative  Eyes: Negative  Respiratory: Negative  Cardiovascular: Negative  Gastrointestinal: Negative  Endocrine: Negative  Genitourinary: Positive for difficulty urinating, frequency and urgency  Musculoskeletal: Negative  Skin: Negative  Allergic/Immunologic: Negative  Neurological: Negative  Hematological: Negative  Psychiatric/Behavioral: Negative  Allergies     Allergies   Allergen Reactions    Iodinated Diagnostic Agents Anaphylaxis    Trilipix [Choline Fenofibrate]      Other reaction(s): jaundice  Other reaction(s): jaundice    Atorvastatin     Shellfish Allergy - Food Allergy        Physical Exam     Physical Exam  Vitals signs reviewed  Constitutional:       General: He is not in acute distress  Appearance: Normal appearance  He is not ill-appearing, toxic-appearing or diaphoretic  HENT:      Head: Normocephalic and atraumatic  Eyes:      General: No scleral icterus  Right eye: No discharge  Left eye: No discharge  Cardiovascular:      Pulses: Normal pulses  Pulmonary:      Effort: Pulmonary effort is normal    Abdominal:      General: There is no distension  Palpations: There is no mass  Tenderness: There is no abdominal tenderness  Hernia: No hernia is present  Genitourinary:     Comments: Penis normal  Musculoskeletal:         General: No swelling or tenderness  Skin:     General: Skin is warm  Neurological:      General: No focal deficit present  Mental Status: He is alert and oriented to person, place, and time  Cranial Nerves: No cranial nerve deficit  Psychiatric:         Mood and Affect: Mood normal          Behavior: Behavior normal          Thought Content:  Thought content normal  Judgment: Judgment normal              Vital Signs  Vitals:    04/15/21 1257   BP: 140/80   BP Location: Left arm   Patient Position: Sitting   Cuff Size: Standard   Pulse: 84   Height: 5' 9" (1 753 m)         Current Medications       Current Outpatient Medications:     acyclovir (ZOVIRAX) 400 MG tablet, Take 400 mg by mouth daily, Disp: , Rfl: 5    ALPRAZolam (XANAX) 1 mg tablet, 1 mg 2 (two) times a day as needed, Disp: , Rfl: 0    aspirin (ECOTRIN LOW STRENGTH) 81 mg EC tablet, Take 81 mg by mouth daily, Disp: , Rfl:     buPROPion (WELLBUTRIN SR) 100 mg 12 hr tablet, TAKE 3 TABLETS DAILY IN THE MORNING, Disp: , Rfl:     cholecalciferol (VITAMIN D3) 1,000 units tablet, Take 1,000 Units by mouth daily, Disp: , Rfl:     colesevelam (WELCHOL) 625 mg tablet, Take 1,250 mg by mouth 2 (two) times a day, Disp: , Rfl:     cyanocobalamin (VITAMIN B-12) 1,000 mcg tablet, Take by mouth daily, Disp: , Rfl:     DULoxetine (CYMBALTA) 30 mg delayed release capsule, , Disp: , Rfl:     gabapentin (NEURONTIN) 300 mg capsule, , Disp: , Rfl:     glimepiride (AMARYL) 1 mg tablet, Take 1 mg by mouth every morning before breakfast, Disp: , Rfl:     JANUMET XR  MG TB24, Take 2 tablets by mouth every evening, Disp: , Rfl: 2    lenalidomide (REVLIMID) 5 MG CAPS, Take 5 mg by mouth daily, Disp: , Rfl:     losartan (COZAAR) 25 mg tablet, Take 25 mg by mouth daily, Disp: , Rfl: 1    metoprolol tartrate (LOPRESSOR) 25 mg tablet, Take 25 mg by mouth every 12 (twelve) hours, Disp: , Rfl:     nitroglycerin (NITROSTAT) 0 4 mg SL tablet, PLACE 1 TABLET UNDER TONGUE EVERY 5 MINS 3 TIMES IF NEEDED FOR CHEST PAIN, Disp: , Rfl: 0    omeprazole (PriLOSEC) 20 mg delayed release capsule, Take 20 mg by mouth daily, Disp: , Rfl:     rosuvastatin (CRESTOR) 5 mg tablet, Take 5 mg by mouth daily, Disp: , Rfl:     sildenafil (REVATIO) 20 mg tablet, Take 1 tablet (20 mg total) by mouth 3 (three) times a day Take 2-4 tablets as needed, Disp: 90 tablet, Rfl: 6    tamsulosin (FLOMAX) 0 4 mg, TAKE 2 CAPSULES (0 8 MG TOTAL) BY MOUTH DAILY WITH DINNER, Disp: 180 capsule, Rfl: 3    traZODone (DESYREL) 50 mg tablet, 50 mg daily at bedtime as needed for sleep, Disp: , Rfl: 1    Vascepa 1 g CAPS, Take 2 capsules by mouth 2 (two) times a day, Disp: , Rfl:     Xarelto 2 5 MG tablet, Take 2 5 mg by mouth 2 (two) times a day, Disp: , Rfl:       Active Problems     Patient Active Problem List   Diagnosis    ED (erectile dysfunction)    Screening for colon cancer    Hypertension    [facility]    Recurrent major depressive disorder, in full remission ([facility] )    Diabetes ([facility] )    Neuropathy    Hyperlipidemia    Oropharyngeal dysphagia    Urinary retention due to benign prostatic hyperplasia    Encounter to discuss test results    Thrombocytopenia ([facility] )    Other ejaculatory dysfunction         Past Medical History     Past Medical History:   Diagnosis Date    Back problem     Diabetes ([facility] )     History of depression     Hyperlipidemia     Hypertension     Multiple myeloma ([facility] )     [facility])          Surgical History     Past Surgical History:   Procedure Laterality Date    CARDIAC CATHETERIZATION      HERNIA REPAIR      NC COLONOSCOPY FLX DX W/COLLJ SPEC WHEN PFRMD N/A 11/16/2018    Procedure: COLONOSCOPY;  Surgeon: Jaylon Oshea MD;  Location: [facility];   Service: Gastroenterology    TONSILLECTOMY      TONSILLECTOMY           Family History     Family History   Problem Relation Age of Onset    Heart disease Father     Diabetes Brother          Social History     Social History     Social History     Tobacco Use   Smoking Status Never Smoker   Smokeless Tobacco Never Used         Pertinent Lab Values     Lab Results   Component Value Date    CREATININE 1 16 02/16/2021       Lab Results   Component Value Date    PSA 0 4 01/14/2021    PSA 0 4 11/12/2019             Pertinent Imaging      real time review of transrectal ultrasound performed

## 2021-04-15 ENCOUNTER — PROCEDURE VISIT (OUTPATIENT)
Dept: UROLOGY | Facility: CLINIC | Age: 66
End: 2021-04-15
Payer: MEDICARE

## 2021-04-15 VITALS
SYSTOLIC BLOOD PRESSURE: 140 MMHG | BODY MASS INDEX: 31.31 KG/M2 | HEIGHT: 69 IN | DIASTOLIC BLOOD PRESSURE: 80 MMHG | HEART RATE: 84 BPM

## 2021-04-15 DIAGNOSIS — N53.19 OTHER EJACULATORY DYSFUNCTION: ICD-10-CM

## 2021-04-15 DIAGNOSIS — C90.01 MULTIPLE MYELOMA IN REMISSION (HCC): ICD-10-CM

## 2021-04-15 DIAGNOSIS — Z71.2 ENCOUNTER TO DISCUSS TEST RESULTS: ICD-10-CM

## 2021-04-15 DIAGNOSIS — R33.8 URINARY RETENTION DUE TO BENIGN PROSTATIC HYPERPLASIA: ICD-10-CM

## 2021-04-15 DIAGNOSIS — N40.1 URINARY RETENTION DUE TO BENIGN PROSTATIC HYPERPLASIA: ICD-10-CM

## 2021-04-15 DIAGNOSIS — D69.6 THROMBOCYTOPENIA (HCC): ICD-10-CM

## 2021-04-15 DIAGNOSIS — N40.0 BENIGN PROSTATIC HYPERPLASIA, UNSPECIFIED WHETHER LOWER URINARY TRACT SYMPTOMS PRESENT: Primary | ICD-10-CM

## 2021-04-15 DIAGNOSIS — N52.9 VASCULOGENIC ERECTILE DYSFUNCTION, UNSPECIFIED VASCULOGENIC ERECTILE DYSFUNCTION TYPE: ICD-10-CM

## 2021-04-15 LAB — POST-VOID RESIDUAL VOLUME, ML POC: 116 ML

## 2021-04-15 PROCEDURE — 51798 US URINE CAPACITY MEASURE: CPT | Performed by: UROLOGY

## 2021-04-15 PROCEDURE — 52000 CYSTOURETHROSCOPY: CPT | Performed by: UROLOGY

## 2021-04-15 PROCEDURE — 76872 US TRANSRECTAL: CPT | Performed by: UROLOGY

## 2021-04-15 PROCEDURE — 99214 OFFICE O/P EST MOD 30 MIN: CPT | Performed by: UROLOGY

## 2021-04-15 RX ORDER — GABAPENTIN 100 MG/1
300 CAPSULE ORAL ONCE
Status: CANCELLED | OUTPATIENT
Start: 2021-04-15 | End: 2021-04-15

## 2021-04-15 RX ORDER — ACETAMINOPHEN 325 MG/1
975 TABLET ORAL ONCE
Status: CANCELLED | OUTPATIENT
Start: 2021-04-15 | End: 2021-04-15

## 2021-04-15 NOTE — ASSESSMENT & PLAN NOTE
Patient with little response to Viagra, spoke with him about Tri Mix therapy and penile prosthesis placement, he does not believe that he would want a prosthesis, he may consider Tri Mix therapy in the future

## 2021-04-15 NOTE — ASSESSMENT & PLAN NOTE
Real-time review of his cystoscopic and transrectal ultrasound findings was performed with him and his wife today in the procedure room

## 2021-04-15 NOTE — ASSESSMENT & PLAN NOTE
The patient's platelet count is around 70 or so, no active bleeding at this time, I did tell him this will increase his risk of bleeding from any bladder outlet obstruction procedure

## 2021-04-15 NOTE — PROGRESS NOTES
Office TRUS    Indication    urinary retention, troubles urinating     Informed consent   The risks, benefits and alternatives to TRUS discussed with patient, informed consent obtained      Transrectal ultrasonography  The patient was placed in the left lateral decubitus position  After an attentive digital rectal examination, a 7 5 mHz sidefire ultrasound probe was gently inserted into the rectum and biplanar imaging of the prostate was done with the findings noted below  Images were taken of any abnormal findings and also to document prostate size  Bladder  The bladder base appeared normal     Prostate  Digital rectal exam findings:  - enlarged, no nodules    Ultrasound size measurements:  -Volume:  40 15 cm3    Ultrasound findings:  -Cysts: None  -Masses: None  -Median lobe: absent    Complications  There were no procedural complications  Disposition  The patient was dismissed to home     Post-procedure instructions: Today he underwent an uncomplicated transrectal ultrasound  Showing a 40 15 gram gland, and high-grade bladder outlet obstruction, patient will likely benefit from bipolar transurethral vaporization of the prostate and incision of the bladder neck          Biopsy prostate     Date/Time 4/15/2021 1:45 PM     Performed by  Hailey Johnson MD     Authorized by Hailey Johnson MD      Universal Protocol   Consent: Verbal consent obtained  Written consent obtained    Risks and benefits: risks, benefits and alternatives were discussed  Consent given by: patient  Patient understanding: patient states understanding of the procedure being performed  Patient consent: the patient's understanding of the procedure matches consent given  Procedure consent: procedure consent matches procedure scheduled  Relevant documents: relevant documents present and verified  Test results: test results available and properly labeled  Site marked: the operative site was not marked  Radiology Images displayed and confirmed  If images not available, report reviewed: imaging studies available  Required items: required blood products, implants, devices, and special equipment available  Patient identity confirmed: verbally with patient and provided demographic data        Local anesthesia used: no     Anesthesia   Local anesthesia used: no     Sedation   Patient sedated: no        Specimen: no    Culture: no   Procedure Details   Procedure Notes: Office TRUS    Indication    urinary retention, troubles urinating     Informed consent   The risks, benefits and alternatives to TRUS discussed with patient, informed consent obtained      Transrectal ultrasonography  The patient was placed in the left lateral decubitus position  After an attentive digital rectal examination, a 7 5 mHz sidefire ultrasound probe was gently inserted into the rectum and biplanar imaging of the prostate was done with the findings noted below  Images were taken of any abnormal findings and also to document prostate size  Bladder  The bladder base appeared normal     Prostate  Digital rectal exam findings:  - enlarged, no nodules    Ultrasound size measurements:  -Volume:  40 15 cm3    Ultrasound findings:  -Cysts: None  -Masses: None  -Median lobe: absent    Complications  There were no procedural complications  Disposition  The patient was dismissed to home     Post-procedure instructions:      Today he underwent an uncomplicated transrectal ultrasound  Showing a 40 15 gram gland, and high-grade bladder outlet obstruction, patient will likely benefit from bipolar transurethral vaporization of the prostate and incision of the bladder neck  Patient Transportation: confirmed  Patient tolerance: patient tolerated the procedure well with no immediate complications

## 2021-04-15 NOTE — LETTER
April 15, 2021     Horacio Leal MD  [address] [place], [place] [id number]    Patient: Ean Almonte   YOB: 1955   Date of Visit: 4/15/2021       Dear Dr Audi Hathaway: Thank you for referring Ean Almonte to me for evaluation  Below are my notes for this consultation  If you have questions, please do not hesitate to call me  I look forward to following your patient along with you  Sincerely,        Latonia Regan MD        CC: No Recipients  Latonia Regan MD  4/15/2021  1:45 PM  Sign when Signing Visit  Office TRUS    Indication    urinary retention, troubles urinating     Informed consent   The risks, benefits and alternatives to TRUS discussed with patient, informed consent obtained      Transrectal ultrasonography  The patient was placed in the left lateral decubitus position  After an attentive digital rectal examination, a 7 5 mHz sidefire ultrasound probe was gently inserted into the rectum and biplanar imaging of the prostate was done with the findings noted below  Images were taken of any abnormal findings and also to document prostate size  Bladder  The bladder base appeared normal     Prostate  Digital rectal exam findings:  - enlarged, no nodules    Ultrasound size measurements:  -Volume:  40 15 cm3    Ultrasound findings:  -Cysts: None  -Masses: None  -Median lobe: absent    Complications  There were no procedural complications  Disposition  The patient was dismissed to home     Post-procedure instructions: Today he underwent an uncomplicated transrectal ultrasound  Showing a 40 15 gram gland, and high-grade bladder outlet obstruction, patient will likely benefit from bipolar transurethral vaporization of the prostate and incision of the bladder neck          Biopsy prostate     Date/Time 4/15/2021 1:45 PM     Performed by  Latonia Regan MD     Authorized by Latonia Regan MD      [place] Protocol   Consent: Verbal consent obtained   Written consent obtained  Risks and benefits: risks, benefits and alternatives were discussed  Consent given by: patient  Patient understanding: patient states understanding of the procedure being performed  Patient consent: the patient's understanding of the procedure matches consent given  Procedure consent: procedure consent matches procedure scheduled  Relevant documents: relevant documents present and verified  Test results: test results available and properly labeled  Site marked: the operative site was not marked  Radiology Images displayed and confirmed  If images not available, report reviewed: imaging studies available  Required items: required blood products, implants, devices, and special equipment available  Patient identity confirmed: verbally with patient and provided demographic data        Local anesthesia used: no     Anesthesia   Local anesthesia used: no     Sedation   Patient sedated: no        Specimen: no    Culture: no   Procedure Details   Procedure Notes: Office TRUS    Indication    urinary retention, troubles urinating     Informed consent   The risks, benefits and alternatives to TRUS discussed with patient, informed consent obtained      Transrectal ultrasonography  The patient was placed in the left lateral decubitus position  After an attentive digital rectal examination, a 7 5 mHz sidefire ultrasound probe was gently inserted into the rectum and biplanar imaging of the prostate was done with the findings noted below  Images were taken of any abnormal findings and also to document prostate size  Bladder  The bladder base appeared normal     Prostate  Digital rectal exam findings:  - enlarged, no nodules    Ultrasound size measurements:  -Volume:  40 15 cm3    Ultrasound findings:  -Cysts: None  -Masses: None  -Median lobe: absent    Complications  There were no procedural complications  Disposition  The patient was dismissed to home     Post-procedure instructions:      Today he underwent an uncomplicated transrectal ultrasound  Showing a 40 15 gram gland, and high-grade bladder outlet obstruction, patient will likely benefit from bipolar transurethral vaporization of the prostate and incision of the bladder neck  Patient Transportation: confirmed  Patient tolerance: patient tolerated the procedure well with no immediate complications                       Lisandro Dan MD  4/15/2021  1:40 PM  Sign when Signing Visit  Office Cystoscopy Procedure Note    Indication:     urinary retention     Informed consent   The risks, benefits, complications, treatment options, and expected outcomes were discussed with the patient  The patient concurred with the proposed plan and provided informed consent  Anesthesia  Lidocaine jelly 2%    Antibiotic prophylaxis   None    Procedure  The patient was placed in the supineposition, was prepped and draped in the usual manner using sterile technique, and 2% lidocaine jelly instilled into the urethra  A 17 F flexible cystoscope was then inserted into the urethra and the urethra and bladder carefully examined    The following findings were noted:    Findings:  Urethra:  No stricture  Prostate:  Lateral  Lobe hypertrophy, high-grade bladder outlet obstruction, elevated bladder neck with high tone  Bladder:   no lesions, tumors, defects, or stones, puckering of the bladder neck is noted with a nipple valve like effect at the base of the bladder  Ureteral orifices:   orthotopic  Other findings:   none, retroflexed view confirms    Specimens: None                 Complications:    None; patient tolerated the procedure well           Disposition: To home            Condition: Stable    Plan:  high-grade bladder outlet obstruction with bladder neck obstruction as well, recommend bipolar transurethral vaporization of the prostate       Cystoscopy     Date/Time 4/15/2021 1:40 PM     Performed by  Lisandro Dan MD     Authorized by Lisandro Dan MD Universal Protocol:  Consent: Verbal consent obtained  Written consent obtained  Risks and benefits: risks, benefits and alternatives were discussed  Consent given by: patient  Patient understanding: patient states understanding of the procedure being performed  Patient consent: the patient's understanding of the procedure matches consent given  Procedure consent: procedure consent matches procedure scheduled  Relevant documents: relevant documents present and verified  Test results: test results available and properly labeled  Site marked: the operative site was not marked  Radiology Images displayed and confirmed  If images not available, report reviewed: imaging studies available  Required items: required blood products, implants, devices, and special equipment available  Patient identity confirmed: verbally with patient and provided demographic data        Procedure Details:  Procedure type: cystoscopy    Patient tolerance: Patient tolerated the procedure well with no immediate complications    Additional Procedure Details: Office Cystoscopy Procedure Note    Indication:     urinary retention     Informed consent   The risks, benefits, complications, treatment options, and expected outcomes were discussed with the patient  The patient concurred with the proposed plan and provided informed consent  Anesthesia  Lidocaine jelly 2%    Antibiotic prophylaxis   None    Procedure  The patient was placed in the supineposition, was prepped and draped in the usual manner using sterile technique, and 2% lidocaine jelly instilled into the urethra  A 17 F flexible cystoscope was then inserted into the urethra and the urethra and bladder carefully examined    The following findings were noted:    Findings:  Urethra:  No stricture  Prostate:  Lateral  Lobe hypertrophy, high-grade bladder outlet obstruction, elevated bladder neck with high tone  Bladder:   no lesions, tumors, defects, or stones, puckering of the bladder neck is noted with a nipple valve like effect at the base of the bladder  Ureteral orifices:   orthotopic  Other findings:   none, retroflexed view confirms    Specimens: None                 Complications:    None; patient tolerated the procedure well           Disposition: To home            Condition: Stable    Plan:  high-grade bladder outlet obstruction with bladder neck obstruction as well, recommend bipolar transurethral vaporization of the prostate              Fernando Velasco MD  4/15/2021  1:38 PM  Sign when Signing Visit       Problem List Items Addressed This Visit        Genitourinary    Urinary retention due to benign prostatic hyperplasia      Patient with a slow urinary stream, difficulty initiating his stream as well, cystoscopy shows a highly obstructed prostatic urethra, with a high tone bladder neck, his transrectal ultrasound shows a 40 15 gram gland    He is inclined to proceed with transurethral vaporization of prostate with a button electrode given his history of thrombocytopenia, multiple myeloma, and use of anticoagulant medications         Relevant Orders    Case request operating room: TRANSURETHRAL RESECTION OF PROSTATE (TURP) (Completed)    Other ejaculatory dysfunction     Patient with complaints of premature ejaculation, likely related to a peripheral neuropathy from his history of diabetes, I told him that this is not a condition that is easily treatable, however if he is to choose Tri Mix therapy in the future he would be expected to have erections sufficient for penetration that would be independent of his reaching climax            Other    ED (erectile dysfunction)      Patient with little response to Viagra, spoke with him about Tri Mix therapy and penile prosthesis placement, he does not believe that he would want a prosthesis, he may consider Tri Mix therapy in the future         Multiple myeloma [facility])      Patient on ongoing therapy with weekly infusions for his history of multiple myeloma, given history of blood/marrow dyscrasia, may be at increased risk for bleeding after outlet surgery         Encounter to discuss test results      Real-time review of his cystoscopic and transrectal ultrasound findings was performed with him and his wife today in the procedure room         Thrombocytopenia ([facility] [address] )      The patient's platelet count is around 70 or so, no active bleeding at this time, I did tell him this will increase his risk of bleeding from any bladder outlet obstruction procedure           Other Visit Diagnoses     Benign prostatic hyperplasia, unspecified whether lower urinary tract symptoms present    -  Primary    Relevant Orders    POCT Measure PVR (Completed)                  Assessment and plan:       Please see problem oriented charting for the assessment plan of today's urological complaints       Chirag Saunders MD      Chief Complaint     Chief Complaint   Patient presents with    Benign Prostatic Hypertrophy    Cystoscopy     TRUS URO/PVR         History of Present Illness     Ace Krunal is a 63 y o  Man with troubles urinating, slow to initiate his urinary stream, intermittency, worsening incomplete bladder emptying with a PVR above 100 milliliters at this time, increased from 60-70 previously  Gets up at night 4-5 times, also on happy with his symptoms, currently on tamsulosin dual dose, he has wanted to avoid finasteride due to his comorbid erectile dysfunction  Also counseled him on erectile dysfunction options today to include penile prosthesis and Tri Mix, he will consider all options going forward but does not believe he would want a prosthesis placed        Endoscopic in cystoscopic workup today show him to be either a candidate for Uro lift or TURP or transurethral vaporization of prostate, I spoke with him about the potential risk of bladder neck contracture, as urethral stricture, incontinence, failure of the procedure, and the risks of infection, bleeding, pain, damage to surrounding structures, need for additional procedures, risk of failure of the procedure, risk of anesthesia, risk of positioning complications including neurapraxia, chronic pain, and paralysis as well as risk of rhabdomyolysis and compartment syndrome  Risk of deep venous thrombosis and venous thromboembolism as well as pneumonia and other potential unpredictable complications were also discussed with the patient       After this discussion he wishes to proceed with a transurethral vaporization of prostate which will have a lower chance of causing bleeding postoperatively    Detailed Urologic History     - please refer to HPI    Review of Systems     Review of Systems   Constitutional: Negative  HENT: Negative  Eyes: Negative  Respiratory: Negative  Cardiovascular: Negative  Gastrointestinal: Negative  Endocrine: Negative  Genitourinary: Positive for difficulty urinating, frequency and urgency  Musculoskeletal: Negative  Skin: Negative  Allergic/Immunologic: Negative  Neurological: Negative  Hematological: Negative  Psychiatric/Behavioral: Negative  Allergies     Allergies   Allergen Reactions    Iodinated Diagnostic Agents Anaphylaxis    Trilipix [Choline Fenofibrate]      Other reaction(s): jaundice  Other reaction(s): jaundice    Atorvastatin     Shellfish Allergy - Food Allergy        Physical Exam     Physical Exam  Vitals signs reviewed  Constitutional:       General: He is not in acute distress  Appearance: Normal appearance  He is not ill-appearing, toxic-appearing or diaphoretic  HENT:      Head: Normocephalic and atraumatic  Eyes:      General: No scleral icterus  Right eye: No discharge  Left eye: No discharge  Cardiovascular:      Pulses: Normal pulses  Pulmonary:      Effort: Pulmonary effort is normal    Abdominal:      General: There is no distension  Palpations: There is no mass  Tenderness: There is no abdominal tenderness  Hernia: No hernia is present  Genitourinary:     Comments: Penis normal  Musculoskeletal:         General: No swelling or tenderness  Skin:     General: Skin is warm  Neurological:      General: No focal deficit present  Mental Status: He is alert and oriented to person, place, and time  Cranial Nerves: No cranial nerve deficit  Psychiatric:         Mood and Affect: Mood normal          Behavior: Behavior normal          Thought Content:  Thought content normal          Judgment: Judgment normal              Vital Signs  Vitals:    04/15/21 1257   BP: 140/80   BP Location: Left arm   Patient Position: Sitting   Cuff Size: Standard   Pulse: 84   Height: 5' 9" (1 753 m)         Current Medications       Current Outpatient Medications:     acyclovir (ZOVIRAX) 400 MG tablet, Take 400 mg by mouth daily, Disp: , Rfl: 5    ALPRAZolam (XANAX) 1 mg tablet, 1 mg 2 (two) times a day as needed, Disp: , Rfl: 0    aspirin (ECOTRIN LOW STRENGTH) 81 mg EC tablet, Take 81 mg by mouth daily, Disp: , Rfl:     buPROPion (WELLBUTRIN SR) 100 mg 12 hr tablet, TAKE 3 TABLETS DAILY IN THE MORNING, Disp: , Rfl:     cholecalciferol (VITAMIN D3) 1,000 units tablet, Take 1,000 Units by mouth daily, Disp: , Rfl:     colesevelam (WELCHOL) 625 mg tablet, Take 1,250 mg by mouth 2 (two) times a day, Disp: , Rfl:     cyanocobalamin (VITAMIN B-12) 1,000 mcg tablet, Take by mouth daily, Disp: , Rfl:     DULoxetine (CYMBALTA) 30 mg delayed release capsule, , Disp: , Rfl:     gabapentin (NEURONTIN) 300 mg capsule, , Disp: , Rfl:     glimepiride (AMARYL) 1 mg tablet, Take 1 mg by mouth every morning before breakfast, Disp: , Rfl:     JANUMET XR  MG TB24, Take 2 tablets by mouth every evening, Disp: , Rfl: 2    lenalidomide (REVLIMID) 5 MG CAPS, Take 5 mg by mouth daily, Disp: , Rfl:     losartan (COZAAR) 25 mg tablet, Take 25 mg by mouth daily, Disp: , Rfl: 1   metoprolol tartrate (LOPRESSOR) 25 mg tablet, Take 25 mg by mouth every 12 (twelve) hours, Disp: , Rfl:     nitroglycerin (NITROSTAT) 0 4 mg SL tablet, PLACE 1 TABLET UNDER TONGUE EVERY 5 MINS 3 TIMES IF NEEDED FOR CHEST PAIN, Disp: , Rfl: 0    omeprazole (PriLOSEC) 20 mg delayed release capsule, Take 20 mg by mouth daily, Disp: , Rfl:     rosuvastatin (CRESTOR) 5 mg tablet, Take 5 mg by mouth daily, Disp: , Rfl:     sildenafil (REVATIO) 20 mg tablet, Take 1 tablet (20 mg total) by mouth 3 (three) times a day Take 2-4 tablets as needed, Disp: 90 tablet, Rfl: 6    tamsulosin (FLOMAX) 0 4 mg, TAKE 2 CAPSULES (0 8 MG TOTAL) BY MOUTH DAILY WITH DINNER, Disp: 180 capsule, Rfl: 3    traZODone (DESYREL) 50 mg tablet, 50 mg daily at bedtime as needed for sleep, Disp: , Rfl: 1    Vascepa 1 g CAPS, Take 2 capsules by mouth 2 (two) times a day, Disp: , Rfl:     Xarelto 2 5 MG tablet, Take 2 5 mg by mouth 2 (two) times a day, Disp: , Rfl:       Active Problems     Patient Active Problem List   Diagnosis    ED (erectile dysfunction)    Screening for colon cancer    Hypertension    [facility] myel[facility] ([facility] [address] )    Recurrent major depressive disorder, in full remission ([facility] [address] )    Diabetes ([facility] [address] )    Neuropathy    Hyperlipidemia    Oropharyngeal dysphagia    Urinary retention due to benign prostatic hyperplasia    Encounter to discuss test results    Thrombocytopenia ([facility] [address] )    Other ejaculatory dysfunction         Past Medical History     Past Medical History:   Diagnosis Date    Back problem     Diabetes ([facility] [address] )     History of depression     Hyperlipidemia     Hypertension     Multiple myeloma ([address] )     Myocardial infarction [facility])          Surgical History     Past Surgical History:   Procedure Laterality Date    CARDIAC CATHETERIZATION      HERNIA REPAIR      VT COLONOSCOPY FLX DX W/COLLJ SPEC WHEN PFRMD N/A 11/16/2018    Procedure: COLONOSCOPY;  Surgeon: James Jaquez MD;  Location: [facility];   Service: Gastroenterology    TONSILLECTOMY      TONSILLECTOMY           Family History     Family History   Problem Relation Age of Onset    Heart disease Father     Diabetes Brother          Social History     Social History     Social History     Tobacco Use   Smoking Status Never Smoker   Smokeless Tobacco Never Used         Pertinent Lab Values     Lab Results   Component Value Date    CREATININE 1 16 02/16/2021       Lab Results   Component Value Date    PSA 0 4 01/14/2021    PSA 0 4 11/12/2019             Pertinent Imaging      real time review of transrectal ultrasound performed

## 2021-04-15 NOTE — ASSESSMENT & PLAN NOTE
Patient with a slow urinary stream, difficulty initiating his stream as well, cystoscopy shows a highly obstructed prostatic urethra, with a high tone bladder neck, his transrectal ultrasound shows a 40 15 gram gland    He is inclined to proceed with transurethral vaporization of prostate with a button electrode given his history of thrombocytopenia, multiple myeloma, and use of anticoagulant medications

## 2021-04-15 NOTE — ASSESSMENT & PLAN NOTE
Patient on ongoing therapy with weekly infusions for his history of multiple myeloma, given history of blood/marrow dyscrasia, may be at increased risk for bleeding after outlet surgery

## 2021-04-15 NOTE — ASSESSMENT & PLAN NOTE
Patient with complaints of premature ejaculation, likely related to a peripheral neuropathy from his history of diabetes, I told him that this is not a condition that is easily treatable, however if he is to choose Tri Mix therapy in the future he would be expected to have erections sufficient for penetration that would be independent of his reaching climax

## 2021-04-15 NOTE — PROGRESS NOTES
Office Cystoscopy Procedure Note    Indication:     urinary retention     Informed consent   The risks, benefits, complications, treatment options, and expected outcomes were discussed with the patient  The patient concurred with the proposed plan and provided informed consent  Anesthesia  Lidocaine jelly 2%    Antibiotic prophylaxis   None    Procedure  The patient was placed in the supineposition, was prepped and draped in the usual manner using sterile technique, and 2% lidocaine jelly instilled into the urethra  A 17 F flexible cystoscope was then inserted into the urethra and the urethra and bladder carefully examined  The following findings were noted:    Findings:  Urethra:  No stricture  Prostate:  Lateral  Lobe hypertrophy, high-grade bladder outlet obstruction, elevated bladder neck with high tone  Bladder:   no lesions, tumors, defects, or stones, puckering of the bladder neck is noted with a nipple valve like effect at the base of the bladder  Ureteral orifices:   orthotopic  Other findings:   none, retroflexed view confirms    Specimens: None                 Complications:    None; patient tolerated the procedure well           Disposition: To home            Condition: Stable    Plan:  high-grade bladder outlet obstruction with bladder neck obstruction as well, recommend bipolar transurethral vaporization of the prostate       Cystoscopy     Date/Time 4/15/2021 1:40 PM     Performed by  Parul Mcgowan MD     Authorized by Parul Mcgowan MD      [place] Protocol:  Consent: Verbal consent obtained  Written consent obtained    Risks and benefits: risks, benefits and alternatives were discussed  Consent given by: patient  Patient understanding: patient states understanding of the procedure being performed  Patient consent: the patient's understanding of the procedure matches consent given  Procedure consent: procedure consent matches procedure scheduled  Relevant documents: relevant documents present and verified  Test results: test results available and properly labeled  Site marked: the operative site was not marked  Radiology Images displayed and confirmed  If images not available, report reviewed: imaging studies available  Required items: required blood products, implants, devices, and special equipment available  Patient identity confirmed: verbally with patient and provided demographic data        Procedure Details:  Procedure type: cystoscopy    Patient tolerance: Patient tolerated the procedure well with no immediate complications    Additional Procedure Details: Office Cystoscopy Procedure Note    Indication:     urinary retention     Informed consent   The risks, benefits, complications, treatment options, and expected outcomes were discussed with the patient  The patient concurred with the proposed plan and provided informed consent  Anesthesia  Lidocaine jelly 2%    Antibiotic prophylaxis   None    Procedure  The patient was placed in the supineposition, was prepped and draped in the usual manner using sterile technique, and 2% lidocaine jelly instilled into the urethra  A 17 F flexible cystoscope was then inserted into the urethra and the urethra and bladder carefully examined    The following findings were noted:    Findings:  Urethra:  No stricture  Prostate:  Lateral  Lobe hypertrophy, high-grade bladder outlet obstruction, elevated bladder neck with high tone  Bladder:   no lesions, tumors, defects, or stones, puckering of the bladder neck is noted with a nipple valve like effect at the base of the bladder  Ureteral orifices:   orthotopic  Other findings:   none, retroflexed view confirms    Specimens: None                 Complications:    None; patient tolerated the procedure well           Disposition: To home            Condition: Stable    Plan:  high-grade bladder outlet obstruction with bladder neck obstruction as well, recommend bipolar transurethral vaporization of the prostate

## 2021-04-16 ENCOUNTER — TELEPHONE (OUTPATIENT)
Dept: UROLOGY | Facility: CLINIC | Age: 66
End: 2021-04-16

## 2021-04-16 ENCOUNTER — PREP FOR PROCEDURE (OUTPATIENT)
Dept: UROLOGY | Facility: CLINIC | Age: 66
End: 2021-04-16

## 2021-04-16 DIAGNOSIS — E11.9 TYPE 2 DIABETES MELLITUS WITHOUT COMPLICATION, WITHOUT LONG-TERM CURRENT USE OF INSULIN (HCC): ICD-10-CM

## 2021-04-16 DIAGNOSIS — N40.0 BENIGN PROSTATIC HYPERPLASIA, UNSPECIFIED WHETHER LOWER URINARY TRACT SYMPTOMS PRESENT: Primary | ICD-10-CM

## 2021-04-16 DIAGNOSIS — Z01.812 ENCOUNTER FOR PREPROCEDURE SCREENING LABORATORY TESTING FOR SEVERE ACUTE RESPIRATORY SYNDROME CORONAVIRUS 2 (SARS-COV-2): ICD-10-CM

## 2021-04-16 DIAGNOSIS — Z20.822 ENCOUNTER FOR PREPROCEDURE SCREENING LABORATORY TESTING FOR SEVERE ACUTE RESPIRATORY SYNDROME CORONAVIRUS 2 (SARS-COV-2): ICD-10-CM

## 2021-04-16 DIAGNOSIS — Z79.01 ANTICOAGULATED: ICD-10-CM

## 2021-04-16 NOTE — TELEPHONE ENCOUNTER
Spoke w patient and he is sched for 6/10 at [facility]  He is aware he will stay for observation, he needs a  and the hospital will contact him day prior w time of arrival  He will go for PATs on May 20th and see his cardiologist on 5/27 for clearance  He will be fully vaccinated so he does not need covid testing prior  Cardiologist will advise blood thinner hold  I am mailing him a surgical packet and he will contact us w any questions or concerns

## 2021-05-04 ENCOUNTER — APPOINTMENT (OUTPATIENT)
Dept: LAB | Facility: CLINIC | Age: 66
End: 2021-05-04
Payer: MEDICARE

## 2021-05-04 ENCOUNTER — TRANSCRIBE ORDERS (OUTPATIENT)
Dept: LAB | Facility: CLINIC | Age: 66
End: 2021-05-04

## 2021-05-04 DIAGNOSIS — E11.9 TYPE 2 DIABETES MELLITUS WITHOUT COMPLICATION, WITHOUT LONG-TERM CURRENT USE OF INSULIN (HCC): ICD-10-CM

## 2021-05-04 DIAGNOSIS — E11.65 TYPE 2 DIABETES MELLITUS WITH HYPERGLYCEMIA, WITHOUT LONG-TERM CURRENT USE OF INSULIN (HCC): Primary | ICD-10-CM

## 2021-05-04 DIAGNOSIS — E11.65 TYPE 2 DIABETES MELLITUS WITH HYPERGLYCEMIA, WITHOUT LONG-TERM CURRENT USE OF INSULIN (HCC): ICD-10-CM

## 2021-05-04 DIAGNOSIS — N40.0 BENIGN PROSTATIC HYPERPLASIA, UNSPECIFIED WHETHER LOWER URINARY TRACT SYMPTOMS PRESENT: ICD-10-CM

## 2021-05-04 LAB
ALBUMIN SERPL BCP-MCNC: 3.9 G/DL (ref 3.5–5)
ALP SERPL-CCNC: 81 U/L (ref 46–116)
ALT SERPL W P-5'-P-CCNC: 39 U/L (ref 12–78)
ANION GAP SERPL CALCULATED.3IONS-SCNC: 6 MMOL/L (ref 4–13)
AST SERPL W P-5'-P-CCNC: 21 U/L (ref 5–45)
BILIRUB SERPL-MCNC: 1.67 MG/DL (ref 0.2–1)
BUN SERPL-MCNC: 15 MG/DL (ref 5–25)
CALCIUM SERPL-MCNC: 8.9 MG/DL (ref 8.3–10.1)
CHLORIDE SERPL-SCNC: 104 MMOL/L (ref 100–108)
CO2 SERPL-SCNC: 27 MMOL/L (ref 21–32)
CREAT SERPL-MCNC: 1.35 MG/DL (ref 0.6–1.3)
EST. AVERAGE GLUCOSE BLD GHB EST-MCNC: 126 MG/DL
GFR SERPL CREATININE-BSD FRML MDRD: 55 ML/MIN/1.73SQ M
GLUCOSE P FAST SERPL-MCNC: 163 MG/DL (ref 65–99)
HBA1C MFR BLD: 6 %
POTASSIUM SERPL-SCNC: 4.4 MMOL/L (ref 3.5–5.3)
PROT SERPL-MCNC: 6.5 G/DL (ref 6.4–8.2)
SODIUM SERPL-SCNC: 137 MMOL/L (ref 136–145)

## 2021-05-04 PROCEDURE — 80053 COMPREHEN METABOLIC PANEL: CPT

## 2021-05-04 PROCEDURE — 83036 HEMOGLOBIN GLYCOSYLATED A1C: CPT

## 2021-05-04 PROCEDURE — 36415 COLL VENOUS BLD VENIPUNCTURE: CPT

## 2021-05-20 ENCOUNTER — HOSPITAL ENCOUNTER (OUTPATIENT)
Dept: RADIOLOGY | Facility: HOSPITAL | Age: 66
Discharge: HOME | End: 2021-05-20
Attending: UROLOGY
Payer: MEDICARE

## 2021-05-20 ENCOUNTER — PREP FOR PROCEDURE (OUTPATIENT)
Dept: UROLOGY | Facility: CLINIC | Age: 66
End: 2021-05-20

## 2021-05-20 ENCOUNTER — APPOINTMENT (OUTPATIENT)
Dept: LAB | Facility: HOSPITAL | Age: 66
End: 2021-05-20
Attending: UROLOGY
Payer: MEDICARE

## 2021-05-20 ENCOUNTER — OFFICE VISIT (OUTPATIENT)
Dept: LAB | Facility: HOSPITAL | Age: 66
End: 2021-05-20
Attending: UROLOGY
Payer: MEDICARE

## 2021-05-20 DIAGNOSIS — N40.0 BENIGN PROSTATIC HYPERPLASIA, UNSPECIFIED WHETHER LOWER URINARY TRACT SYMPTOMS PRESENT: ICD-10-CM

## 2021-05-20 DIAGNOSIS — Z20.822 ENCOUNTER FOR PREPROCEDURE SCREENING LABORATORY TESTING FOR SEVERE ACUTE RESPIRATORY SYNDROME CORONAVIRUS 2 (SARS-COV-2): Primary | ICD-10-CM

## 2021-05-20 DIAGNOSIS — Z01.812 ENCOUNTER FOR PREPROCEDURE SCREENING LABORATORY TESTING FOR SEVERE ACUTE RESPIRATORY SYNDROME CORONAVIRUS 2 (SARS-COV-2): Primary | ICD-10-CM

## 2021-05-20 DIAGNOSIS — N40.0 BENIGN PROSTATIC HYPERPLASIA, UNSPECIFIED WHETHER LOWER URINARY TRACT SYMPTOMS PRESENT: Primary | ICD-10-CM

## 2021-05-20 DIAGNOSIS — Z79.01 ANTICOAGULATED: ICD-10-CM

## 2021-05-20 LAB
ABO GROUP BLD: NORMAL
APTT PPP: 42 SECONDS (ref 23–37)
ATRIAL RATE: 89 BPM
BASOPHILS # BLD AUTO: 0.01 THOUSANDS/ΜL (ref 0–0.1)
BASOPHILS NFR BLD AUTO: 0 % (ref 0–1)
BLD GP AB SCN SERPL QL: NEGATIVE
EOSINOPHIL # BLD AUTO: 0.04 THOUSAND/ΜL (ref 0–0.61)
EOSINOPHIL NFR BLD AUTO: 1 % (ref 0–6)
ERYTHROCYTE [DISTWIDTH] IN BLOOD BY AUTOMATED COUNT: 18.8 % (ref 11.6–15.1)
HCT VFR BLD AUTO: 30.8 % (ref 36.5–49.3)
HGB BLD-MCNC: 9.7 G/DL (ref 12–17)
IMM GRANULOCYTES # BLD AUTO: 0.07 THOUSAND/UL (ref 0–0.2)
IMM GRANULOCYTES NFR BLD AUTO: 1 % (ref 0–2)
INR PPP: 1.37 (ref 0.84–1.19)
LYMPHOCYTES # BLD AUTO: 0.59 THOUSANDS/ΜL (ref 0.6–4.47)
LYMPHOCYTES NFR BLD AUTO: 8 % (ref 14–44)
MCH RBC QN AUTO: 23.2 PG (ref 26.8–34.3)
MCHC RBC AUTO-ENTMCNC: 31.5 G/DL (ref 31.4–37.4)
MCV RBC AUTO: 74 FL (ref 82–98)
MONOCYTES # BLD AUTO: 0.74 THOUSAND/ΜL (ref 0.17–1.22)
MONOCYTES NFR BLD AUTO: 9 % (ref 4–12)
NEUTROPHILS # BLD AUTO: 6.42 THOUSANDS/ΜL (ref 1.85–7.62)
NEUTS SEG NFR BLD AUTO: 81 % (ref 43–75)
NRBC BLD AUTO-RTO: 0 /100 WBCS
P AXIS: 49 DEGREES
PLATELET # BLD AUTO: 56 THOUSANDS/UL (ref 149–390)
PR INTERVAL: 154 MS
PROTHROMBIN TIME: 16.2 SECONDS (ref 11.6–14.5)
QRS AXIS: 30 DEGREES
QRSD INTERVAL: 102 MS
QT INTERVAL: 372 MS
QTC INTERVAL: 452 MS
RBC # BLD AUTO: 4.18 MILLION/UL (ref 3.88–5.62)
RH BLD: POSITIVE
SPECIMEN EXPIRATION DATE: NORMAL
T WAVE AXIS: 21 DEGREES
VENTRICULAR RATE: 89 BPM
WBC # BLD AUTO: 7.87 THOUSAND/UL (ref 4.31–10.16)

## 2021-05-20 PROCEDURE — 86850 RBC ANTIBODY SCREEN: CPT

## 2021-05-20 PROCEDURE — 71046 X-RAY EXAM CHEST 2 VIEWS: CPT

## 2021-05-20 PROCEDURE — 86900 BLOOD TYPING SEROLOGIC ABO: CPT

## 2021-05-20 PROCEDURE — 85610 PROTHROMBIN TIME: CPT

## 2021-05-20 PROCEDURE — 93005 ELECTROCARDIOGRAM TRACING: CPT

## 2021-05-20 PROCEDURE — 93010 ELECTROCARDIOGRAM REPORT: CPT | Performed by: INTERNAL MEDICINE

## 2021-05-20 PROCEDURE — 36415 COLL VENOUS BLD VENIPUNCTURE: CPT

## 2021-05-20 PROCEDURE — 85025 COMPLETE CBC W/AUTO DIFF WBC: CPT

## 2021-05-20 PROCEDURE — 87086 URINE CULTURE/COLONY COUNT: CPT

## 2021-05-20 PROCEDURE — 86901 BLOOD TYPING SEROLOGIC RH(D): CPT

## 2021-05-20 PROCEDURE — 85730 THROMBOPLASTIN TIME PARTIAL: CPT

## 2021-05-20 NOTE — TELEPHONE ENCOUNTER
Farzana Shields called in stating patient is at the lab for pre testing and missing XR   Farzana Shields can be reached at [phone]

## 2021-05-21 DIAGNOSIS — N39.0 URINARY TRACT INFECTION WITHOUT HEMATURIA, SITE UNSPECIFIED: Primary | ICD-10-CM

## 2021-05-21 LAB — BACTERIA UR CULT: ABNORMAL

## 2021-05-21 RX ORDER — CEPHALEXIN 500 MG/1
500 CAPSULE ORAL EVERY 12 HOURS SCHEDULED
Qty: 6 CAPSULE | Refills: 0 | Status: SHIPPED | OUTPATIENT
Start: 2021-05-21 | End: 2021-05-24

## 2021-05-21 NOTE — TELEPHONE ENCOUNTER
Given slightly pink-tinged urine and fever and the fact that we are about to go into the weekend, I will give him a short course of Keflex  Will await urine culture results to determine if additional pre-op antibiotics are needed

## 2021-05-21 NOTE — TELEPHONE ENCOUNTER
Patient pending   Called and spoke with patient  He is concerned about urine culture results  Reviewed that urine culture results can take 48 to 72 hours  He reports he noticed pink tinged urine when he went for the testing  He had not noticed this before  Otherwise urinary symptoms are at baseline no worse  He denies dysuria  He does note low grade fever 100 2 starting today and generalized fatigue/achey  Will review with provider  Patient may want to fu with pcp to ensure fever not related to another process  Patient verbalized understanding

## 2021-05-21 NOTE — TELEPHONE ENCOUNTER
Called patient and reviewed that antibiotics were prescribed  Will FU on Monday to determine if further treatment is needed based on urine culture results    If symptoms worsen over the weekend he should call the office as we do have a provider on call

## 2021-05-24 DIAGNOSIS — N39.0 URINARY TRACT INFECTION WITHOUT HEMATURIA, SITE UNSPECIFIED: Primary | ICD-10-CM

## 2021-05-24 RX ORDER — CEPHALEXIN 500 MG/1
500 CAPSULE ORAL EVERY 12 HOURS SCHEDULED
Qty: 10 CAPSULE | Refills: 0 | Status: SHIPPED | OUTPATIENT
Start: 2021-05-24 | End: 2021-06-02 | Stop reason: HOSPADM

## 2021-05-24 NOTE — TELEPHONE ENCOUNTER
All placed to patient, advised of order for Kelfex to be started five days prior to surgery  Patient verbalized understanding and agrees with plan

## 2021-05-24 NOTE — TELEPHONE ENCOUNTER
Will await urine culture susceptibilities to determine potential abx prior to surgery    Advise patient to hydrate

## 2021-05-24 NOTE — TELEPHONE ENCOUNTER
Called and spoke to patient  Patient stated that he didn't notice any pink urine  Patient denies any fevers  Patient denies any odor or cloudiness to the urine but expresses that it is foamy  Patient denies any dysuria or gross hematuria  Patient is hydrating with water  Advised will route to the provider for recommendations and someone from the office will call back

## 2021-05-24 NOTE — TELEPHONE ENCOUNTER
Will await susceptibilities   Please inquire if patient is still symptomatic after short course of abx over the weekend

## 2021-05-25 ENCOUNTER — TELEMEDICINE (OUTPATIENT)
Dept: INTERNAL MEDICINE CLINIC | Facility: CLINIC | Age: 66
End: 2021-05-25
Payer: MEDICARE

## 2021-05-25 DIAGNOSIS — R05.9 COUGH: ICD-10-CM

## 2021-05-25 DIAGNOSIS — Z11.59 NEED FOR HEPATITIS C SCREENING TEST: ICD-10-CM

## 2021-05-25 DIAGNOSIS — Z23 ENCOUNTER FOR IMMUNIZATION: ICD-10-CM

## 2021-05-25 DIAGNOSIS — Z11.4 SCREENING FOR HIV (HUMAN IMMUNODEFICIENCY VIRUS): ICD-10-CM

## 2021-05-25 DIAGNOSIS — R50.9 FEVER, UNSPECIFIED FEVER CAUSE: ICD-10-CM

## 2021-05-25 DIAGNOSIS — E11.9 TYPE 2 DIABETES MELLITUS WITHOUT COMPLICATION, WITHOUT LONG-TERM CURRENT USE OF INSULIN (HCC): Primary | ICD-10-CM

## 2021-05-25 DIAGNOSIS — G44.209 TENSION-TYPE HEADACHE, NOT INTRACTABLE, UNSPECIFIED CHRONICITY PATTERN: ICD-10-CM

## 2021-05-25 PROCEDURE — 99214 OFFICE O/P EST MOD 30 MIN: CPT | Performed by: PHYSICIAN ASSISTANT

## 2021-05-25 PROCEDURE — U0003 INFECTIOUS AGENT DETECTION BY NUCLEIC ACID (DNA OR RNA); SEVERE ACUTE RESPIRATORY SYNDROME CORONAVIRUS 2 (SARS-COV-2) (CORONAVIRUS DISEASE [COVID-19]), AMPLIFIED PROBE TECHNIQUE, MAKING USE OF HIGH THROUGHPUT TECHNOLOGIES AS DESCRIBED BY CMS-2020-01-R: HCPCS | Performed by: PHYSICIAN ASSISTANT

## 2021-05-25 PROCEDURE — U0005 INFEC AGEN DETEC AMPLI PROBE: HCPCS | Performed by: PHYSICIAN ASSISTANT

## 2021-05-25 RX ORDER — BUPROPION HYDROCHLORIDE 100 MG/1
TABLET, EXTENDED RELEASE ORAL
COMMUNITY
Start: 2021-05-12

## 2021-05-25 RX ORDER — BENZONATATE 200 MG/1
200 CAPSULE ORAL 3 TIMES DAILY PRN
Qty: 20 CAPSULE | Refills: 0 | Status: SHIPPED | OUTPATIENT
Start: 2021-05-25 | End: 2021-08-16 | Stop reason: ALTCHOICE

## 2021-05-25 RX ORDER — TRAZODONE HYDROCHLORIDE 50 MG/1
50 TABLET ORAL AS NEEDED
COMMUNITY
Start: 2021-05-12

## 2021-05-25 RX ORDER — DULOXETIN HYDROCHLORIDE 60 MG/1
60 CAPSULE, DELAYED RELEASE ORAL DAILY
COMMUNITY
Start: 2021-05-02

## 2021-05-25 NOTE — PROGRESS NOTES
Virtual Regular Visit      Assessment/Plan: currently comfortable not coughing  Will treat him as allergic  Check a COVID test this afternoon  Will treated with Allegra D Tylenol in [place]  Follow-up in 2 days if no better    Problem List Items Addressed This Visit        Endocrine    Diabetes ([facility] [address] ) - Primary    Relevant Orders    [facility] Diabetic eye exam      Other Visit Diagnoses     Need for hepatitis C screening test        Relevant Orders    Hepatitis C Antibody ([facility])    Encounter for immunization        Screening for HIV (human immunodeficiency virus)        Relevant Orders    HIV 1/2 Antigen/Antibody (4th Generation) w Reflex [facility]    Cough        Relevant Orders    Novel Coronavirus (Covid-19),PCR [facility] - Collected in Office    Fever, unspecified fever cause        Relevant Orders    Novel Coronavirus (Covid-19),PCR [facility] - Collected in Office    Tension-type headache, not intractable, unspecified chronicity pattern        Relevant Medications    DULoxetine (CYMBALTA) 60 mg delayed release capsule    buPROPion (WELLBUTRIN SR) 100 mg 12 hr tablet    traZODone (DESYREL) 50 mg tablet    Other Relevant Orders    Novel Coronavirus (Covid-19),PCR [facility] - Collected in Office               Reason for visit is   Chief Complaint   Patient presents with    Virtual Regular Visit        Encounter provider Ruthie Fraire PA-C    Provider located at [address] [place] [address]      Recent Visits  No visits were found meeting these conditions  Showing recent visits within past 7 days and meeting all other requirements     Future Appointments  No visits were found meeting these conditions  Showing future appointments within next 150 days and meeting all other requirements        The patient was identified by name and date of birth   [employer] was informed that this is a telemedicine visit and that the visit is being conducted through [employer] and patient was informed that this is a secure, HIPAA-compliant platform  He agrees to proceed     My office door was closed  No one else was in the room  He acknowledged consent and understanding of privacy and security of the video platform  The patient has agreed to participate and understands they can discontinue the visit at any time  Patient is aware this is a billable service  Subjective  Jesse Whipple is a 63 y o  male    He developed a cough 5 days ago  He has periods of cough a few coughs at a time quite forceful  Nonproductive  He has had a headache for several days  Sometimes he feels a little short of breath no fever chills currently he had a fever 4 days ago was treated with Keflex for UTI  He is currently on Keflex  He has been vaccinated against COVID  Currently no fever or wheezing no diarrhea or loss of smell  Having headache and backache in the past few days  Currently being cleared for surgery for BPH by Urology  Chest x-ray 5 days ago was negative  Other labs were okay a bit anemic  History of multiple myeloma       Past Medical History:   Diagnosis Date    Back problem     Diabetes ([facility] [address] )     History of depression     Hyperlipidemia     Hypertension     Multiple myeloma ([facility] [address] )     Myocardial infarction [facility])        Past Surgical History:   Procedure Laterality Date    CARDIAC CATHETERIZATION      HERNIA REPAIR      ID COLONOSCOPY FLX DX W/COLLJ SPEC WHEN PFRMD N/A 11/16/2018    Procedure: COLONOSCOPY;  Surgeon: Amelia Cox MD;  Location: [facility];   Service: Gastroenterology    TONSILLECTOMY      TONSILLECTOMY         Current Outpatient Medications   Medication Sig Dispense Refill    buPROPion (WELLBUTRIN SR) 100 mg 12 hr tablet Take 3 tablets daily in the morning      traZODone (DESYREL) 50 mg tablet Take 50 mg by mouth daily      acyclovir (ZOVIRAX) 400 MG tablet Take 400 mg by mouth daily  5    ALPRAZolam (XANAX) 1 mg tablet 1 mg 2 (two) times a day as needed  0    aspirin (ECOTRIN LOW STRENGTH) 81 mg EC tablet Take 81 mg by mouth daily      cephalexin (KEFLEX) 500 mg capsule Take 1 capsule (500 mg total) by mouth every 12 (twelve) hours for 5 days Start 5 days before scheduled procedure 10 capsule 0    cholecalciferol (VITAMIN D3) 1,000 units tablet Take 1,000 Units by mouth daily      colesevelam (WELCHOL) 625 mg tablet Take 1,250 mg by mouth 2 (two) times a day      cyanocobalamin (VITAMIN B-12) 1,000 mcg tablet Take by mouth daily      DULoxetine (CYMBALTA) 30 mg delayed release capsule       DULoxetine (CYMBALTA) 60 mg delayed release capsule Take 60 mg by mouth daily      gabapentin (NEURONTIN) 300 mg capsule       glimepiride (AMARYL) 1 mg tablet Take 1 mg by mouth every morning before breakfast      JANUMET XR  MG TB24 Take 2 tablets by mouth every evening  2    lenalidomide (REVLIMID) 5 MG CAPS Take 5 mg by mouth daily      losartan (COZAAR) 25 mg tablet Take 25 mg by mouth daily  1    metoprolol tartrate (LOPRESSOR) 25 mg tablet Take 25 mg by mouth every 12 (twelve) hours      nitroglycerin (NITROSTAT) 0 4 mg SL tablet PLACE 1 TABLET UNDER TONGUE EVERY 5 MINS 3 TIMES IF NEEDED FOR CHEST PAIN  0    omeprazole (PriLOSEC) 20 mg delayed release capsule Take 20 mg by mouth daily      rosuvastatin (CRESTOR) 5 mg tablet Take 5 mg by mouth daily      sildenafil (REVATIO) 20 mg tablet Take 1 tablet (20 mg total) by mouth 3 (three) times a day Take 2-4 tablets as needed 90 tablet 6    tamsulosin (FLOMAX) 0 4 mg TAKE 2 CAPSULES (0 8 MG TOTAL) BY MOUTH DAILY WITH DINNER 180 capsule 3    Vascepa 1 g CAPS Take 2 capsules by mouth 2 (two) times a day      Xarelto 2 5 MG tablet Take 2 5 mg by mouth 2 (two) times a day       No current facility-administered medications for this visit           Allergies   Allergen Reactions    Iodinated Diagnostic Agents Anaphylaxis    Trilipix [Choline Fenofibrate] Other reaction(s): jaundice  Other reaction(s): jaundice    Atorvastatin     Shellfish Allergy - Food Allergy        Review of Systems    Video Exam    There were no vitals filed for this visit  Physical Exam  HENT:      Head: Normocephalic  Eyes:      Extraocular Movements: Extraocular movements intact  Neck:      Musculoskeletal: Normal range of motion  Pulmonary:      Effort: Pulmonary effort is normal  No respiratory distress  Neurological:      Mental Status: He is alert and oriented to person, place, and time  I spent   Fifteen minutes directly with the patient during this visit      VIRTUAL VISIT DISCLAIMER    Ralph Du acknowledges that he has consented to an online visit or consultation  He understands that the online visit is based solely on information provided by him, and that, in the absence of a face-to-face physical evaluation by the physician, the diagnosis he receives is both limited and provisional in terms of accuracy and completeness  This is not intended to replace a full medical face-to-face evaluation by the physician  Ralph Du understands and accepts these terms

## 2021-05-26 ENCOUNTER — TELEPHONE (OUTPATIENT)
Dept: INTERNAL MEDICINE CLINIC | Facility: CLINIC | Age: 66
End: 2021-05-26

## 2021-05-26 LAB — SARS-COV-2 RNA RESP QL NAA+PROBE: NEGATIVE

## 2021-05-26 NOTE — TELEPHONE ENCOUNTER
----- Message from Allen Tran PA-C sent at 5/26/2021 10:50 AM EDT -----  Please let him know his COVID is negative

## 2021-05-28 NOTE — TELEPHONE ENCOUNTER
Jennifer w [facility] and they are faxing clearance to [facility] location  Pls scan into patients chart once received   Thank you

## 2021-05-29 ENCOUNTER — APPOINTMENT (EMERGENCY)
Dept: CT IMAGING | Facility: HOSPITAL | Age: 66
DRG: 871 | End: 2021-05-29
Payer: MEDICARE

## 2021-05-29 ENCOUNTER — HOSPITAL ENCOUNTER (INPATIENT)
Facility: HOSPITAL | Age: 66
LOS: 4 days | Discharge: HOME | DRG: 871 | End: 2021-06-02
Attending: EMERGENCY MEDICINE | Admitting: FAMILY MEDICINE
Payer: MEDICARE

## 2021-05-29 DIAGNOSIS — D50.9 MICROCYTIC ANEMIA: ICD-10-CM

## 2021-05-29 DIAGNOSIS — C90.01 MULTIPLE MYELOMA IN REMISSION (HCC): ICD-10-CM

## 2021-05-29 DIAGNOSIS — A41.9 SEPSIS (HCC): Primary | ICD-10-CM

## 2021-05-29 DIAGNOSIS — J18.9 MULTIFOCAL PNEUMONIA: ICD-10-CM

## 2021-05-29 PROBLEM — N18.31 STAGE 3A CHRONIC KIDNEY DISEASE (HCC): Status: ACTIVE | Noted: 2021-05-29

## 2021-05-29 PROBLEM — I25.10 CAD (CORONARY ARTERY DISEASE): Status: ACTIVE | Noted: 2021-05-29

## 2021-05-29 PROBLEM — R06.02 SHORTNESS OF BREATH: Status: ACTIVE | Noted: 2021-05-29

## 2021-05-29 LAB
ALBUMIN SERPL BCP-MCNC: 2.1 G/DL (ref 3.5–5)
ALP SERPL-CCNC: 247 U/L (ref 46–116)
ALT SERPL W P-5'-P-CCNC: 20 U/L (ref 12–78)
ANION GAP SERPL CALCULATED.3IONS-SCNC: 13 MMOL/L (ref 4–13)
ANION GAP SERPL CALCULATED.3IONS-SCNC: 9 MMOL/L (ref 4–13)
APTT PPP: 40 SECONDS (ref 23–37)
AST SERPL W P-5'-P-CCNC: 13 U/L (ref 5–45)
BACTERIA UR QL AUTO: NORMAL /HPF
BASOPHILS # BLD AUTO: 0.01 THOUSANDS/ΜL (ref 0–0.1)
BASOPHILS NFR BLD AUTO: 0 % (ref 0–1)
BILIRUB SERPL-MCNC: 1.9 MG/DL (ref 0.2–1)
BILIRUB UR QL STRIP: NEGATIVE
BUN SERPL-MCNC: 15 MG/DL (ref 5–25)
BUN SERPL-MCNC: 15 MG/DL (ref 5–25)
CALCIUM ALBUM COR SERPL-MCNC: 9.7 MG/DL (ref 8.3–10.1)
CALCIUM SERPL-MCNC: 7.9 MG/DL (ref 8.3–10.1)
CALCIUM SERPL-MCNC: 8.2 MG/DL (ref 8.3–10.1)
CHLORIDE SERPL-SCNC: 100 MMOL/L (ref 100–108)
CHLORIDE SERPL-SCNC: 96 MMOL/L (ref 100–108)
CLARITY UR: CLEAR
CO2 SERPL-SCNC: 24 MMOL/L (ref 21–32)
CO2 SERPL-SCNC: 24 MMOL/L (ref 21–32)
COLOR UR: YELLOW
CREAT SERPL-MCNC: 1.34 MG/DL (ref 0.6–1.3)
CREAT SERPL-MCNC: 1.43 MG/DL (ref 0.6–1.3)
EOSINOPHIL # BLD AUTO: 0.06 THOUSAND/ΜL (ref 0–0.61)
EOSINOPHIL NFR BLD AUTO: 1 % (ref 0–6)
ERYTHROCYTE [DISTWIDTH] IN BLOOD BY AUTOMATED COUNT: 18.3 % (ref 11.6–15.1)
GFR SERPL CREATININE-BSD FRML MDRD: 52 ML/MIN/1.73SQ M
GFR SERPL CREATININE-BSD FRML MDRD: 56 ML/MIN/1.73SQ M
GLUCOSE SERPL-MCNC: 130 MG/DL (ref 65–140)
GLUCOSE SERPL-MCNC: 136 MG/DL (ref 65–140)
GLUCOSE SERPL-MCNC: 136 MG/DL (ref 65–140)
GLUCOSE SERPL-MCNC: 156 MG/DL (ref 65–140)
GLUCOSE UR STRIP-MCNC: ABNORMAL MG/DL
HCT VFR BLD AUTO: 24.7 % (ref 36.5–49.3)
HGB BLD-MCNC: 7.9 G/DL (ref 12–17)
HGB UR QL STRIP.AUTO: ABNORMAL
IMM GRANULOCYTES # BLD AUTO: 0.06 THOUSAND/UL (ref 0–0.2)
IMM GRANULOCYTES NFR BLD AUTO: 1 % (ref 0–2)
INR PPP: 1.31 (ref 0.84–1.19)
KETONES UR STRIP-MCNC: ABNORMAL MG/DL
L PNEUMO1 AG UR QL IA.RAPID: NEGATIVE
LACTATE SERPL-SCNC: 2 MMOL/L (ref 0.5–2)
LEUKOCYTE ESTERASE UR QL STRIP: NEGATIVE
LYMPHOCYTES # BLD AUTO: 0.51 THOUSANDS/ΜL (ref 0.6–4.47)
LYMPHOCYTES NFR BLD AUTO: 11 % (ref 14–44)
MCH RBC QN AUTO: 22.6 PG (ref 26.8–34.3)
MCHC RBC AUTO-ENTMCNC: 32 G/DL (ref 31.4–37.4)
MCV RBC AUTO: 71 FL (ref 82–98)
MONOCYTES # BLD AUTO: 0.41 THOUSAND/ΜL (ref 0.17–1.22)
MONOCYTES NFR BLD AUTO: 9 % (ref 4–12)
NEUTROPHILS # BLD AUTO: 3.55 THOUSANDS/ΜL (ref 1.85–7.62)
NEUTS SEG NFR BLD AUTO: 78 % (ref 43–75)
NITRITE UR QL STRIP: NEGATIVE
NON-SQ EPI CELLS URNS QL MICRO: NORMAL /HPF
NRBC BLD AUTO-RTO: 0 /100 WBCS
NT-PROBNP SERPL-MCNC: 337 PG/ML
PH UR STRIP.AUTO: 5.5 [PH]
PLATELET # BLD AUTO: 68 THOUSANDS/UL (ref 149–390)
POTASSIUM SERPL-SCNC: 3.6 MMOL/L (ref 3.5–5.3)
POTASSIUM SERPL-SCNC: 3.8 MMOL/L (ref 3.5–5.3)
PROCALCITONIN SERPL-MCNC: 0.62 NG/ML
PROT SERPL-MCNC: 6.6 G/DL (ref 6.4–8.2)
PROT UR STRIP-MCNC: ABNORMAL MG/DL
PROTHROMBIN TIME: 15.6 SECONDS (ref 11.6–14.5)
RBC # BLD AUTO: 3.5 MILLION/UL (ref 3.88–5.62)
RBC #/AREA URNS AUTO: NORMAL /HPF
S PNEUM AG UR QL: NEGATIVE
SARS-COV-2 RNA RESP QL NAA+PROBE: NEGATIVE
SODIUM SERPL-SCNC: 133 MMOL/L (ref 136–145)
SODIUM SERPL-SCNC: 133 MMOL/L (ref 136–145)
SP GR UR STRIP.AUTO: 1.01 (ref 1–1.03)
TROPONIN I SERPL-MCNC: <0.02 NG/ML
UROBILINOGEN UR QL STRIP.AUTO: 1 E.U./DL
WBC # BLD AUTO: 4.6 THOUSAND/UL (ref 4.31–10.16)
WBC #/AREA URNS AUTO: NORMAL /HPF

## 2021-05-29 PROCEDURE — 87081 CULTURE SCREEN ONLY: CPT | Performed by: FAMILY MEDICINE

## 2021-05-29 PROCEDURE — 36415 COLL VENOUS BLD VENIPUNCTURE: CPT | Performed by: EMERGENCY MEDICINE

## 2021-05-29 PROCEDURE — 96368 THER/DIAG CONCURRENT INF: CPT

## 2021-05-29 PROCEDURE — 85025 COMPLETE CBC W/AUTO DIFF WBC: CPT | Performed by: EMERGENCY MEDICINE

## 2021-05-29 PROCEDURE — 83605 ASSAY OF LACTIC ACID: CPT | Performed by: EMERGENCY MEDICINE

## 2021-05-29 PROCEDURE — 87449 NOS EACH ORGANISM AG IA: CPT | Performed by: FAMILY MEDICINE

## 2021-05-29 PROCEDURE — 84484 ASSAY OF TROPONIN QUANT: CPT | Performed by: EMERGENCY MEDICINE

## 2021-05-29 PROCEDURE — G1004 CDSM NDSC: HCPCS

## 2021-05-29 PROCEDURE — 99285 EMERGENCY DEPT VISIT HI MDM: CPT

## 2021-05-29 PROCEDURE — 85610 PROTHROMBIN TIME: CPT | Performed by: EMERGENCY MEDICINE

## 2021-05-29 PROCEDURE — 87040 BLOOD CULTURE FOR BACTERIA: CPT | Performed by: EMERGENCY MEDICINE

## 2021-05-29 PROCEDURE — U0003 INFECTIOUS AGENT DETECTION BY NUCLEIC ACID (DNA OR RNA); SEVERE ACUTE RESPIRATORY SYNDROME CORONAVIRUS 2 (SARS-COV-2) (CORONAVIRUS DISEASE [COVID-19]), AMPLIFIED PROBE TECHNIQUE, MAKING USE OF HIGH THROUGHPUT TECHNOLOGIES AS DESCRIBED BY CMS-2020-01-R: HCPCS | Performed by: EMERGENCY MEDICINE

## 2021-05-29 PROCEDURE — 74177 CT ABD & PELVIS W/CONTRAST: CPT

## 2021-05-29 PROCEDURE — 82948 REAGENT STRIP/BLOOD GLUCOSE: CPT

## 2021-05-29 PROCEDURE — 80048 BASIC METABOLIC PNL TOTAL CA: CPT | Performed by: FAMILY MEDICINE

## 2021-05-29 PROCEDURE — 96375 TX/PRO/DX INJ NEW DRUG ADDON: CPT

## 2021-05-29 PROCEDURE — 99285 EMERGENCY DEPT VISIT HI MDM: CPT | Performed by: EMERGENCY MEDICINE

## 2021-05-29 PROCEDURE — 80053 COMPREHEN METABOLIC PANEL: CPT | Performed by: EMERGENCY MEDICINE

## 2021-05-29 PROCEDURE — 96361 HYDRATE IV INFUSION ADD-ON: CPT

## 2021-05-29 PROCEDURE — 93005 ELECTROCARDIOGRAM TRACING: CPT

## 2021-05-29 PROCEDURE — 71275 CT ANGIOGRAPHY CHEST: CPT

## 2021-05-29 PROCEDURE — 83880 ASSAY OF NATRIURETIC PEPTIDE: CPT | Performed by: EMERGENCY MEDICINE

## 2021-05-29 PROCEDURE — 99223 1ST HOSP IP/OBS HIGH 75: CPT | Performed by: FAMILY MEDICINE

## 2021-05-29 PROCEDURE — 85730 THROMBOPLASTIN TIME PARTIAL: CPT | Performed by: EMERGENCY MEDICINE

## 2021-05-29 PROCEDURE — U0005 INFEC AGEN DETEC AMPLI PROBE: HCPCS | Performed by: EMERGENCY MEDICINE

## 2021-05-29 PROCEDURE — 81001 URINALYSIS AUTO W/SCOPE: CPT | Performed by: EMERGENCY MEDICINE

## 2021-05-29 PROCEDURE — 84145 PROCALCITONIN (PCT): CPT | Performed by: EMERGENCY MEDICINE

## 2021-05-29 PROCEDURE — 96365 THER/PROPH/DIAG IV INF INIT: CPT

## 2021-05-29 PROCEDURE — 94664 DEMO&/EVAL PT USE INHALER: CPT

## 2021-05-29 RX ORDER — ALBUTEROL SULFATE 2.5 MG/3ML
2.5 SOLUTION RESPIRATORY (INHALATION) EVERY 4 HOURS PRN
Status: DISCONTINUED | OUTPATIENT
Start: 2021-05-29 | End: 2021-06-02 | Stop reason: HOSPADM

## 2021-05-29 RX ORDER — ASPIRIN 81 MG/1
81 TABLET ORAL DAILY
Status: DISCONTINUED | OUTPATIENT
Start: 2021-05-29 | End: 2021-06-02 | Stop reason: HOSPADM

## 2021-05-29 RX ORDER — ALPRAZOLAM 0.5 MG/1
1 TABLET ORAL 2 TIMES DAILY PRN
Status: DISCONTINUED | OUTPATIENT
Start: 2021-05-29 | End: 2021-06-02 | Stop reason: HOSPADM

## 2021-05-29 RX ORDER — TAMSULOSIN HYDROCHLORIDE 0.4 MG/1
0.8 CAPSULE ORAL
Status: DISCONTINUED | OUTPATIENT
Start: 2021-05-29 | End: 2021-06-02 | Stop reason: HOSPADM

## 2021-05-29 RX ORDER — MORPHINE SULFATE 4 MG/ML
4 INJECTION, SOLUTION INTRAMUSCULAR; INTRAVENOUS ONCE
Status: COMPLETED | OUTPATIENT
Start: 2021-05-29 | End: 2021-05-29

## 2021-05-29 RX ORDER — ACETAMINOPHEN 325 MG/1
650 TABLET ORAL EVERY 6 HOURS PRN
Status: DISCONTINUED | OUTPATIENT
Start: 2021-05-29 | End: 2021-06-02 | Stop reason: HOSPADM

## 2021-05-29 RX ORDER — LENALIDOMIDE 5 MG/1
5 CAPSULE ORAL DAILY
Status: DISCONTINUED | OUTPATIENT
Start: 2021-05-29 | End: 2021-06-02

## 2021-05-29 RX ORDER — DULOXETIN HYDROCHLORIDE 30 MG/1
30 CAPSULE, DELAYED RELEASE ORAL DAILY
Status: DISCONTINUED | OUTPATIENT
Start: 2021-05-29 | End: 2021-06-02 | Stop reason: HOSPADM

## 2021-05-29 RX ORDER — SILDENAFIL CITRATE 20 MG/1
20 TABLET ORAL 3 TIMES DAILY
Status: DISCONTINUED | OUTPATIENT
Start: 2021-05-29 | End: 2021-05-31

## 2021-05-29 RX ORDER — BUPROPION HYDROCHLORIDE 150 MG/1
300 TABLET ORAL DAILY
Status: DISCONTINUED | OUTPATIENT
Start: 2021-05-29 | End: 2021-06-02 | Stop reason: HOSPADM

## 2021-05-29 RX ORDER — ACYCLOVIR 800 MG/1
400 TABLET ORAL DAILY
Status: DISCONTINUED | OUTPATIENT
Start: 2021-05-29 | End: 2021-06-02 | Stop reason: HOSPADM

## 2021-05-29 RX ORDER — BENZONATATE 100 MG/1
200 CAPSULE ORAL 3 TIMES DAILY PRN
Status: DISCONTINUED | OUTPATIENT
Start: 2021-05-29 | End: 2021-06-02 | Stop reason: HOSPADM

## 2021-05-29 RX ORDER — LOSARTAN POTASSIUM 25 MG/1
25 TABLET ORAL DAILY
Status: DISCONTINUED | OUTPATIENT
Start: 2021-05-29 | End: 2021-06-02 | Stop reason: HOSPADM

## 2021-05-29 RX ORDER — GABAPENTIN 100 MG/1
100 CAPSULE ORAL 2 TIMES DAILY
Status: DISCONTINUED | OUTPATIENT
Start: 2021-05-29 | End: 2021-06-02 | Stop reason: HOSPADM

## 2021-05-29 RX ORDER — TRAZODONE HYDROCHLORIDE 50 MG/1
50 TABLET ORAL DAILY
Status: DISCONTINUED | OUTPATIENT
Start: 2021-05-29 | End: 2021-06-02 | Stop reason: HOSPADM

## 2021-05-29 RX ORDER — PANTOPRAZOLE SODIUM 40 MG/1
40 TABLET, DELAYED RELEASE ORAL
Status: DISCONTINUED | OUTPATIENT
Start: 2021-05-30 | End: 2021-06-02 | Stop reason: HOSPADM

## 2021-05-29 RX ORDER — SODIUM CHLORIDE 9 MG/ML
100 INJECTION, SOLUTION INTRAVENOUS ONCE
Status: COMPLETED | OUTPATIENT
Start: 2021-05-29 | End: 2021-05-30

## 2021-05-29 RX ADMIN — VANCOMYCIN HYDROCHLORIDE 1500 MG: 1 INJECTION, POWDER, LYOPHILIZED, FOR SOLUTION INTRAVENOUS at 12:36

## 2021-05-29 RX ADMIN — BUPROPION 300 MG: 150 TABLET, EXTENDED RELEASE ORAL at 16:13

## 2021-05-29 RX ADMIN — TAMSULOSIN HYDROCHLORIDE 0.8 MG: 0.4 CAPSULE ORAL at 16:13

## 2021-05-29 RX ADMIN — SODIUM CHLORIDE 1000 ML: 0.9 INJECTION, SOLUTION INTRAVENOUS at 10:51

## 2021-05-29 RX ADMIN — IOHEXOL 100 ML: 350 INJECTION, SOLUTION INTRAVENOUS at 11:39

## 2021-05-29 RX ADMIN — DULOXETINE HYDROCHLORIDE 30 MG: 30 CAPSULE, DELAYED RELEASE ORAL at 16:14

## 2021-05-29 RX ADMIN — ACYCLOVIR 400 MG: 800 TABLET ORAL at 16:14

## 2021-05-29 RX ADMIN — SITAGLIPTIN 50 MG: 50 TABLET, FILM COATED ORAL at 16:13

## 2021-05-29 RX ADMIN — ASPIRIN 81 MG: 81 TABLET, COATED ORAL at 16:13

## 2021-05-29 RX ADMIN — MORPHINE SULFATE 4 MG: 4 INJECTION INTRAVENOUS at 10:51

## 2021-05-29 RX ADMIN — RIVAROXABAN 2.5 MG: 2.5 TABLET, FILM COATED ORAL at 18:30

## 2021-05-29 RX ADMIN — METOPROLOL TARTRATE 25 MG: 25 TABLET, FILM COATED ORAL at 21:52

## 2021-05-29 RX ADMIN — LOSARTAN POTASSIUM 25 MG: 25 TABLET, FILM COATED ORAL at 16:13

## 2021-05-29 RX ADMIN — SODIUM CHLORIDE 100 ML/HR: 0.9 INJECTION, SOLUTION INTRAVENOUS at 16:09

## 2021-05-29 RX ADMIN — CEFEPIME HYDROCHLORIDE 2000 MG: 2 INJECTION, POWDER, FOR SOLUTION INTRAVENOUS at 13:02

## 2021-05-29 NOTE — ASSESSMENT & PLAN NOTE
· Chronic; continue home medications of Lopressor 25 mg b i d  and Cozaar 25 mg daily  · Monitor vital signs closely

## 2021-05-29 NOTE — ASSESSMENT & PLAN NOTE
· Likely in setting of multifocal pneumonia    · Respiratory protocol, airway clearance protocol  · Encourage incentive spirometry 10 times an hour  · Supplemental oxygen to maintain O2 sat greater than 92%

## 2021-05-29 NOTE — ASSESSMENT & PLAN NOTE
· Although this is chronic, baseline hemoglobin appears to be around 9 to 10,  · Hemoglobin on admission noted to be 7 9  · Iron panel pending  · CBC pending for this morning

## 2021-05-29 NOTE — ED PROVIDER NOTES
History  Chief Complaint   Patient presents with    Shortness of Breath     pt co of SOB and cough onset earlier this week, L flank pain and low grade fevers, was on abx last week for UTI      81-year-old male presents the emergency room with chest pain and shortness of breath  Patient states that he has had a cough for the last 2 weeks  States that he fell out of bed 6 days ago and thinks he may have injured his right lower ribs  He states that he has had pain and shortness of breath since  States that it hurts to take a deep breath and cough  He denies any fevers, nausea/vomiting, abdominal pain, urinary symptoms, or diarrhea/constipation  He saw his family doctor for the cough last week and was placed on Allegra and given a cough medicine which has not been helping  He denies any other complaints  History provided by:  Patient  Shortness of Breath  Severity:  Moderate  Onset quality:  Sudden  Timing:  Constant  Progression:  Worsening  Chronicity:  New  Relieved by:  None tried  Worsened by:  Nothing  Ineffective treatments:  None tried  Associated symptoms: chest pain and cough    Associated symptoms: no abdominal pain, no ear pain, no fever, no headaches, no neck pain, no rash, no sore throat, no vomiting and no wheezing        Prior to Admission Medications   Prescriptions Last Dose Informant Patient Reported? Taking?    ALPRAZolam (XANAX) 1 mg tablet  Self Yes Yes   Si mg 2 (two) times a day as needed   DULoxetine (CYMBALTA) 30 mg delayed release capsule  Self Yes Yes   DULoxetine (CYMBALTA) 60 mg delayed release capsule   Yes Yes   Sig: Take 60 mg by mouth daily   JANUMET XR  MG TB24  Self Yes Yes   Sig: Take 2 tablets by mouth every evening   Vascepa 1 g CAPS  Self Yes Yes   Sig: Take 2 capsules by mouth 2 (two) times a day   Xarelto 2 5 MG tablet  Self Yes Yes   Sig: Take 2 5 mg by mouth 2 (two) times a day   acyclovir (ZOVIRAX) 400 MG tablet  Self Yes Yes   Sig: Take 400 mg by mouth daily   aspirin (ECOTRIN LOW STRENGTH) 81 mg EC tablet  Self Yes Yes   Sig: Take 81 mg by mouth daily   benzonatate (TESSALON) 200 MG capsule   No Yes   Sig: Take 1 capsule (200 mg total) by mouth 3 (three) times a day as needed for cough   buPROPion (WELLBUTRIN SR) 100 mg 12 hr tablet   Yes Yes   Sig: Take 3 tablets daily in the morning   cephalexin (KEFLEX) 500 mg capsule Not Taking at Unknown time  No No   Sig: Take 1 capsule (500 mg total) by mouth every 12 (twelve) hours for 5 days Start 5 days before scheduled procedure   Patient not taking: Reported on 5/29/2021   cholecalciferol (VITAMIN D3) 1,000 units tablet  Self Yes Yes   Sig: Take 1,000 Units by mouth daily   colesevelam (WELCHOL) 625 mg tablet  Self Yes Yes   Sig: Take 1,250 mg by mouth 2 (two) times a day   cyanocobalamin (VITAMIN B-12) 1,000 mcg tablet  Self Yes Yes   Sig: Take by mouth daily   gabapentin (NEURONTIN) 300 mg capsule Not Taking at Unknown time Self Yes No   glimepiride (AMARYL) 1 mg tablet  Self Yes Yes   Sig: Take 1 mg by mouth every morning before breakfast   lenalidomide (REVLIMID) 5 MG CAPS  Self Yes Yes   Sig: Take 5 mg by mouth daily   losartan (COZAAR) 25 mg tablet  Self Yes Yes   Sig: Take 25 mg by mouth daily   metoprolol tartrate (LOPRESSOR) 25 mg tablet  Self Yes Yes   Sig: Take 25 mg by mouth every 12 (twelve) hours   nitroglycerin (NITROSTAT) 0 4 mg SL tablet  Self Yes Yes   Sig: PLACE 1 TABLET UNDER TONGUE EVERY 5 MINS 3 TIMES IF NEEDED FOR CHEST PAIN   omeprazole (PriLOSEC) 20 mg delayed release capsule  Self Yes Yes   Sig: Take 20 mg by mouth daily   rosuvastatin (CRESTOR) 5 mg tablet  Self Yes Yes   Sig: Take 5 mg by mouth daily   sildenafil (REVATIO) 20 mg tablet  Self No Yes   Sig: Take 1 tablet (20 mg total) by mouth 3 (three) times a day Take 2-4 tablets as needed   tamsulosin (FLOMAX) 0 4 mg   No Yes   Sig: TAKE 2 CAPSULES (0 8 MG TOTAL) BY MOUTH DAILY WITH DINNER   traZODone (DESYREL) 50 mg tablet   Yes Yes Sig: Take 50 mg by mouth daily      Facility-Administered Medications: None       Past Medical History:   Diagnosis Date    Back problem     Diabetes ([facility] )     History of depression     Hyperlipidemia     Hypertension     [facility]     Myocardial infarction [facility])        Past Surgical History:   Procedure Laterality Date    CARDIAC CATHETERIZATION      HERNIA REPAIR      NM COLONOSCOPY FLX DX W/COLLJ SPEC WHEN PFRMD N/A 11/16/2018    Procedure: COLONOSCOPY;  Surgeon: James Jaquez MD;  Location: [facility]; Service: Gastroenterology    TONSILLECTOMY      TONSILLECTOMY         Family History   Problem Relation Age of Onset    Heart disease Father     Diabetes Brother      I have reviewed and agree with the history as documented  E-Cigarette/Vaping    E-Cigarette Use Never User      E-Cigarette/Vaping Substances    Nicotine No     THC No     CBD No     Flavoring No     Other No     Unknown No      Social History     Tobacco Use    Smoking status: Never Smoker    Smokeless tobacco: Never Used   Substance Use Topics    Alcohol use: Yes     Frequency: Monthly or less     Drinks per session: 1 or 2     Binge frequency: Never     Comment: socially     Drug use: No       Review of Systems   Constitutional: Negative for chills and fever  HENT: Negative for congestion, ear pain and sore throat  Eyes: Negative for pain and visual disturbance  Respiratory: Positive for cough and shortness of breath  Negative for wheezing  Cardiovascular: Positive for chest pain  Negative for leg swelling  Gastrointestinal: Negative for abdominal pain, diarrhea, nausea and vomiting  Genitourinary: Negative for dysuria, frequency, hematuria and urgency  Musculoskeletal: Negative for neck pain and neck stiffness  Skin: Negative for rash and wound  Neurological: Negative for weakness, numbness and headaches  Psychiatric/Behavioral: Negative for agitation and confusion     All other systems reviewed and are negative  Physical Exam  Physical Exam  Vitals signs and nursing note reviewed  Constitutional:       Appearance: He is well-developed  HENT:      Head: Normocephalic and atraumatic  Eyes:      Pupils: Pupils are equal, round, and reactive to light  Neck:      Musculoskeletal: Normal range of motion and neck supple  Cardiovascular:      Rate and Rhythm: Regular rhythm  Tachycardia present  Pulmonary:      Effort: Pulmonary effort is normal       Breath sounds: Examination of the right-lower field reveals decreased breath sounds and rhonchi  Decreased breath sounds and rhonchi present  Chest:      Chest wall: Tenderness present  Abdominal:      General: Bowel sounds are normal       Palpations: Abdomen is soft  Musculoskeletal: Normal range of motion  Skin:     General: Skin is warm and dry  Neurological:      General: No focal deficit present  Mental Status: He is alert and oriented to person, place, and time        Comments: No focal deficits         Vital Signs  ED Triage Vitals   Temperature Pulse Respirations Blood Pressure SpO2   05/29/21 0945 05/29/21 0945 05/29/21 0945 05/29/21 0945 05/29/21 0945   99 °F (37 2 °C) (!) 120 (!) 28 134/60 91 %      Temp Source Heart Rate Source Patient Position - Orthostatic VS BP Location FiO2 (%)   05/29/21 0945 05/29/21 0945 05/29/21 0945 05/29/21 0945 --   Oral Monitor Sitting Right arm       Pain Score       05/29/21 1051       Worst Possible Pain           Vitals:    05/29/21 1300 05/29/21 1400 05/29/21 1434 05/29/21 1441   BP: 123/59 126/60 129/67    Pulse: 96 93 (!) 107 100   Patient Position - Orthostatic VS: Sitting Sitting           Visual Acuity      ED Medications  Medications   acyclovir (ZOVIRAX) tablet 400 mg (400 mg Oral Given 5/29/21 1614)   ALPRAZolam (XANAX) tablet 1 mg (has no administration in time range)   aspirin (ECOTRIN LOW STRENGTH) EC tablet 81 mg (81 mg Oral Given 5/29/21 1613) benzonatate (TESSALON PERLES) capsule 200 mg (has no administration in time range)   buPROPion (WELLBUTRIN XL) 24 hr tablet 300 mg (300 mg Oral Given 5/29/21 1613)   DULoxetine (CYMBALTA) delayed release capsule 30 mg (30 mg Oral Given 5/29/21 1614)   gabapentin (NEURONTIN) capsule 100 mg (has no administration in time range)   lenalidomide (REVLIMID) capsule 5 mg (0 mg Oral Hold 5/29/21 1616)   losartan (COZAAR) tablet 25 mg (25 mg Oral Given 5/29/21 1613)   metoprolol tartrate (LOPRESSOR) tablet 25 mg (has no administration in time range)   pantoprazole (PROTONIX) EC tablet 40 mg (has no administration in time range)   sildenafil (REVATIO) tablet 20 mg (has no administration in time range)   tamsulosin (FLOMAX) capsule 0 8 mg (0 8 mg Oral Given 5/29/21 1613)   traZODone (DESYREL) tablet 50 mg (0 mg Oral Hold 5/29/21 1612)   rivaroxaban (XARELTO) tablet 2 5 mg (has no administration in time range)   sitaGLIPtin (JANUVIA) tablet 50 mg (50 mg Oral Given 5/29/21 1613)   acetaminophen (TYLENOL) tablet 650 mg (has no administration in time range)   insulin lispro (HumaLOG) 100 units/mL subcutaneous injection 1-6 Units (1 Units Subcutaneous Not Given 5/29/21 1611)   albuterol inhalation solution 2 5 mg (has no administration in time range)   sodium chloride 0 9 % bolus 1,000 mL (0 mL Intravenous Stopped 5/29/21 1301)   morphine (PF) 4 mg/mL injection 4 mg (4 mg Intravenous Given 5/29/21 1051)   iohexol (OMNIPAQUE) 350 MG/ML injection (SINGLE-DOSE) 100 mL (100 mL Intravenous Given 5/29/21 1139)   vancomycin (VANCOCIN) 1500 mg in sodium chloride 0 9% 250 mL IVPB (1,500 mg Intravenous New Bag 5/29/21 1236)   cefepime (MAXIPIME) 2,000 mg in dextrose 5 % 50 mL IVPB (0 mg Intravenous Stopped 5/29/21 1332)   sodium chloride 0 9 % infusion (100 mL/hr Intravenous New Bag 5/29/21 2358)       Diagnostic Studies  Results Reviewed     Procedure Component Value Units Date/Time    Urine Microscopic [[id number]]  (Normal) Collected: 05/29/21 1305    Lab Status: Final result Specimen: Urine, Clean Catch Updated: 05/29/21 1318     RBC, UA 0-1 /hpf      WBC, UA None Seen /hpf      Epithelial Cells None Seen /hpf      Bacteria, UA None Seen /hpf     UA w Reflex to Microscopic w Reflex to Culture [[id number]]  (Abnormal) Collected: 05/29/21 1305    Lab Status: Final result Specimen: Urine, Clean Catch Updated: 05/29/21 1310     Color, UA Yellow     Clarity, UA Clear     Specific Gravity, UA 1 010     pH, UA 5 5     Leukocytes, UA Negative     Nitrite, UA Negative     Protein, UA Trace mg/dl      Glucose,  (1/10%) mg/dl      Ketones, UA 15 (1+) mg/dl      Urobilinogen, UA 1 0 E U /dl      Bilirubin, UA Negative     Blood, UA Trace-Intact    Novel Coronavirus (Covid-19),PCR [facility] - 2 Hour Stat [[id number]]  (Normal) Collected: 05/29/21 1118    Lab Status: Final result Specimen: Nares from Nose Updated: 05/29/21 1217     SARS-CoV-2 Negative    Narrative: The specimen collection materials, transport medium, and/or testing methodology utilized in the production of these test results have been proven to be reliable in a limited validation with an abbreviated program under the Emergency Utilization Authorization provided by the FDA  Testing reported as "Presumptive positive" will be confirmed with secondary testing to ensure result accuracy  Clinical caution and judgement should be used with the interpretation of these results with consideration of the clinical impression and other laboratory testing  Testing reported as "Positive" or "Negative" has been proven to be accurate according to standard laboratory validation requirements  All testing is performed with control materials showing appropriate reactivity at standard intervals        NT-BNP PRO [[id number]]  (Abnormal) Collected: 05/29/21 1044    Lab Status: Final result Specimen: Blood from Arm, Right Updated: 05/29/21 1127     NT-proBNP 337 pg/mL     Comprehensive metabolic panel [[id number]]  (Abnormal) Collected: 05/29/21 1044    Lab Status: Final result Specimen: Blood from Arm, Right Updated: 05/29/21 1125     Sodium 133 mmol/L      Potassium 3 6 mmol/L      Chloride 96 mmol/L      CO2 24 mmol/L      ANION GAP 13 mmol/L      BUN 15 mg/dL      Creatinine 1 43 mg/dL      Glucose 136 mg/dL      Calcium 8 2 mg/dL      Corrected Calcium 9 7 mg/dL      AST 13 U/L      ALT 20 U/L      Alkaline Phosphatase 247 U/L      Total Protein 6 6 g/dL      Albumin 2 1 g/dL      Total Bilirubin 1 90 mg/dL      eGFR 52 ml/min/1 73sq m     Narrative:      [place] guidelines for Chronic Kidney Disease (CKD):     Stage 1 with normal or high GFR (GFR > 90 mL/min/1 73 square meters)    Stage 2 Mild CKD (GFR = 60-89 mL/min/1 73 square meters)    Stage 3A Moderate CKD (GFR = 45-59 mL/min/1 73 square meters)    Stage 3B Moderate CKD (GFR = 30-44 mL/min/1 73 square meters)    Stage 4 Severe CKD (GFR = 15-29 mL/min/1 73 square meters)    Stage 5 End Stage CKD (GFR <15 mL/min/1 73 square meters)  Note: GFR calculation is accurate only with a steady state creatinine    Blood culture #1 [[id number]] Collected: 05/29/21 1118    Lab Status: In process Specimen: Blood from Arm, Left Updated: 05/29/21 1124    Troponin I [[id number]]  (Normal) Collected: 05/29/21 1044    Lab Status: Final result Specimen: Blood from Arm, Right Updated: 05/29/21 1122     Troponin I <0 02 ng/mL     Lactic acid [[id number]]  (Normal) Collected: 05/29/21 1044    Lab Status: Final result Specimen: Blood from Arm, Right Updated: 05/29/21 1121     LACTIC ACID 2 0 mmol/L     Narrative:      Result may be elevated if tourniquet was used during collection      CBC and differential [[id number]]  (Abnormal) Collected: 05/29/21 1044    Lab Status: Final result Specimen: Blood from Arm, Right Updated: 05/29/21 1121     WBC 4 60 Thousand/uL      RBC 3 50 Million/uL      Hemoglobin 7 9 g/dL      Hematocrit 24 7 %      MCV 71 fL MCH 22 6 pg      MCHC 32 0 g/dL      RDW 18 3 %      Platelets 68 Thousands/uL      nRBC 0 /100 WBCs      Neutrophils Relative 78 %      Immat GRANS % 1 %      Lymphocytes Relative 11 %      Monocytes Relative 9 %      Eosinophils Relative 1 %      Basophils Relative 0 %      Neutrophils Absolute 3 55 Thousands/µL      Immature Grans Absolute 0 06 Thousand/uL      Lymphocytes Absolute 0 51 Thousands/µL      Monocytes Absolute 0 41 Thousand/µL      Eosinophils Absolute 0 06 Thousand/µL      Basophils Absolute 0 01 Thousands/µL     Protime-INR [[id number]]  (Abnormal) Collected: 05/29/21 1044    Lab Status: Final result Specimen: Blood from Arm, Right Updated: 05/29/21 1111     Protime 15 6 seconds      INR 1 31    APTT [[id number]]  (Abnormal) Collected: 05/29/21 1044    Lab Status: Final result Specimen: Blood from Arm, Right Updated: 05/29/21 1111     PTT 40 seconds     Procalcitonin with AM Reflex [[id number]] Collected: 05/29/21 1044    Lab Status: In process Specimen: Blood from Arm, Right Updated: 05/29/21 1057    Blood culture #2 [[id number]] Collected: 05/29/21 1044    Lab Status: In process Specimen: Blood from Arm, Right Updated: 05/29/21 1056                 PE Study with CT abdomen & pelvis with contrast   Final Result by Bertell Gilford, MD (05/29 1227)         1  Alveolar consolidation in both lungs in a dependent fashion as described above most consistent with multifocal pneumonia  Small right pleural effusion  2   Suboptimal opacification of the pulmonary artery as described above with no evidence for large central pulmonary emboli  3   Hepatosplenomegaly  4   Additional findings as noted  The study was marked in [facility] for immediate notification                       Workstation performed: [id number]                    Procedures  ECG 12 Lead Documentation Only    Date/Time: 5/29/2021 4:53 PM  Performed by: Darling Tillman DO  Authorized by: Darling Tillman DO     Indications / Diagnosis: Sob   Patient location:  ED  Previous ECG:     Previous ECG:  Compared to current    Comparison ECG info:  5/20/21    Similarity:  No change  Rate:     ECG rate:  113    ECG rate assessment: tachycardic    Rhythm:     Rhythm: sinus tachycardia    QRS:     QRS axis:  Normal    QRS intervals:  Normal  ST segments:     ST segments:  Normal  T waves:     T waves: normal               ED Course  ED Course as of May 29 1655   Sat May 29, 2021   1234 Patient now with source of infection- will initiate abx   PE Study with CT abdomen & pelvis with contrast                             SBIRT 22yo+      Most Recent Value   SBIRT (23 yo +)   In order to provide better care to our patients, we are screening all of our patients for alcohol and drug use  Would it be okay to ask you these screening questions? Yes Filed at: 05/29/2021 1001   Initial Alcohol Screen: US AUDIT-C    1  How often do you have a drink containing alcohol?  0 Filed at: 05/29/2021 1001   2  How many drinks containing alcohol do you have on a typical day you are drinking? 0 Filed at: 05/29/2021 1001   3a  Male UNDER 65: How often do you have five or more drinks on one occasion? 0 Filed at: 05/29/2021 1001   Audit-C Score  0 Filed at: 05/29/2021 1001   HAMLET: How many times in the past year have you    Used an illegal drug or used a prescription medication for non-medical reasons? Never Filed at: 05/29/2021 1001                    MDM  Number of Diagnoses or Management Options  Multifocal pneumonia: new and requires workup  Sepsis [facility]): new and requires workup  Diagnosis management comments: Patient with cough, sob, and chest pain- will get cardiac workup and ct chest/abd/pel  Will give pain meds and reassess  Patient reevaluated and feels improved  Patient updated on results of tests and plan of care including admission to hospital for further evaluation of presenting complaint  Patient demonstrates verbal understanding and agrees with plan   Report to Dr Adrienne Alvarez  with TABBY for continuation of patient care  Amount and/or Complexity of Data Reviewed  Clinical lab tests: ordered and reviewed  Tests in the radiology section of CPT®: ordered and reviewed  Tests in the medicine section of CPT®: ordered and reviewed  Discussion of test results with the performing providers: yes  Decide to obtain previous medical records or to obtain history from someone other than the patient: yes  Obtain history from someone other than the patient: yes  Review and summarize past medical records: yes  Discuss the patient with other providers: yes  Independent visualization of images, tracings, or specimens: yes    Patient Progress  Patient progress: improved      Disposition  Final diagnoses:   [facility]epsis ([facility] )   Multifocal pneumonia     Time reflects when diagnosis was documented in both MDM as applicable and the Disposition within this note     Time User Action Codes Description Comment    5/29/2021  1:46 PM Yuliana TAY Add [A41 9] Sepsis ([facility] )     5/29/2021  1:46 PM Yuliana TAY Add [J18 9] Multifocal pneumonia       ED Disposition     ED Disposition Condition Date/Time Comment    Admit Stable Sat May 29, 2021  1:46 PM Case was discussed with TABBY and the patient's admission status was agreed to be Admission Status: inpatient status to the service of Dr Adrienne Alvarez           Follow-up Information    None         Current Discharge Medication List      CONTINUE these medications which have NOT CHANGED    Details   acyclovir (ZOVIRAX) 400 MG tablet Take 400 mg by mouth daily  Refills: 5      ALPRAZolam (XANAX) 1 mg tablet 1 mg 2 (two) times a day as needed  Refills: 0      aspirin (ECOTRIN LOW STRENGTH) 81 mg EC tablet Take 81 mg by mouth daily      benzonatate (TESSALON) 200 MG capsule Take 1 capsule (200 mg total) by mouth 3 (three) times a day as needed for cough  Qty: 20 capsule, Refills: 0    Associated Diagnoses: Cough      buPROPion (WELLBUTRIN SR) 100 mg 12 hr tablet Take 3 tablets daily in the morning      cholecalciferol (VITAMIN D3) 1,000 units tablet Take 1,000 Units by mouth daily      colesevelam (WELCHOL) 625 mg tablet Take 1,250 mg by mouth 2 (two) times a day      cyanocobalamin (VITAMIN B-12) 1,000 mcg tablet Take by mouth daily      !! DULoxetine (CYMBALTA) 30 mg delayed release capsule       !!  DULoxetine (CYMBALTA) 60 mg delayed release capsule Take 60 mg by mouth daily      glimepiride (AMARYL) 1 mg tablet Take 1 mg by mouth every morning before breakfast      JANUMET XR  MG TB24 Take 2 tablets by mouth every evening  Refills: 2      lenalidomide (REVLIMID) 5 MG CAPS Take 5 mg by mouth daily      losartan (COZAAR) 25 mg tablet Take 25 mg by mouth daily  Refills: 1      metoprolol tartrate (LOPRESSOR) 25 mg tablet Take 25 mg by mouth every 12 (twelve) hours      nitroglycerin (NITROSTAT) 0 4 mg SL tablet PLACE 1 TABLET UNDER TONGUE EVERY 5 MINS 3 TIMES IF NEEDED FOR CHEST PAIN  Refills: 0      omeprazole (PriLOSEC) 20 mg delayed release capsule Take 20 mg by mouth daily      rosuvastatin (CRESTOR) 5 mg tablet Take 5 mg by mouth daily      sildenafil (REVATIO) 20 mg tablet Take 1 tablet (20 mg total) by mouth 3 (three) times a day Take 2-4 tablets as needed  Qty: 90 tablet, Refills: 6    Associated Diagnoses: Erectile dysfunction, unspecified erectile dysfunction type      tamsulosin (FLOMAX) 0 4 mg TAKE 2 CAPSULES (0 8 MG TOTAL) BY MOUTH DAILY WITH DINNER  Qty: 180 capsule, Refills: 3    Associated Diagnoses: Benign prostatic hyperplasia, unspecified whether lower urinary tract symptoms present      traZODone (DESYREL) 50 mg tablet Take 50 mg by mouth daily      Vascepa 1 g CAPS Take 2 capsules by mouth 2 (two) times a day      Xarelto 2 5 MG tablet Take 2 5 mg by mouth 2 (two) times a day      cephalexin (KEFLEX) 500 mg capsule Take 1 capsule (500 mg total) by mouth every 12 (twelve) hours for 5 days Start 5 days before scheduled procedure  Qty: 10 capsule, Refills: 0    Associated Diagnoses: Urinary tract infection without hematuria, site unspecified      gabapentin (NEURONTIN) 300 mg capsule        !! - Potential duplicate medications found  Please discuss with provider  No discharge procedures on file      PDMP Review     None          ED Provider  Electronically Signed by           Giancarlo Hill DO  05/29/21 9311

## 2021-05-29 NOTE — H&P
H&P Exam - Flynn Mattson 63 y o  male MRN: [id number]    Unit/Bed#: -01 Encounter: [id number]      Sepsis due to pneumonia [facility])  Assessment & Plan  Present on admission evidenced by heart rate and respiratory rate, normal lactic  Normal white blood cell count, but patient was on antibiotics for urinary tract infection  Blood cultures were obtained x2 in the emergency department  Patient received vancomycin and cefepime in the emergency department  Will transition to Rocephin and azithromycin  Check urine Legionella and pneumococcal antigen  Check sputum culture  Check MRSA screen  Check procalcitonin  Supportive care in the form of antitussives and IV fluids     Shortness of breath  Assessment & Plan  Initiate respiratory protocol, airway clearance protocol  Encourage incentive spirometry 10 times an hour  Supplemental oxygen to maintain O2 sat greater than 92%    Microcytic anemia  Assessment & Plan  Although this is chronic, baseline hemoglobin appears to be around 9 to 10  Check iron panel and stool for occult blood    CAD (coronary artery disease)  Assessment & Plan  With history of PCI  Continue BB / aspirin / xarelto    Stage 3a chronic kidney disease [facility])  Assessment & Plan  Lab Results   Component Value Date    EGFR 52 05/29/2021    EGFR 55 05/04/2021    EGFR 67 02/16/2021    CREATININE 1 43 (H) 05/29/2021    CREATININE 1 35 (H) 05/04/2021    CREATININE 1 16 02/16/2021     Baseline creatinine is 1 2-1 4    Hyperlipidemia  Assessment & Plan  Patient has an allergy to Lipitor and Crestor is non formulary, hold statin therapy while hospitalized    Type 2 diabetes mellitus with kidney complication, without long-term current use of insulin (HCC)  Assessment & Plan  Lab Results   Component Value Date    HGBA1C 6 0 (H) 05/04/2021       No results for input(s): POCGLU in the last 72 hours  Blood Sugar Average: Last 72 hrs:     Well controlled  Hold metformin and glimepiride   Continue Januvia 50 mg daily  Diabetic diet  Accu-Cheks AC and HS algorithm 3 sliding scale coverage    Multiple myeloma ([facility] )  Assessment & Plan  Continue Revlimid and outpatient follow-up with Hematology    Essential hypertension  Assessment & Plan  Lopressor 25 mg b i d  Cozaar 25 mg daily        History of Present Illness      The patient is a very pleasant 80-year-old male with a past medical history significant for coronary artery disease, type 2 diabetes mellitus and multiple myeloma presents to the emergency room with complaint of shortness of breath and dry cough  Patient states symptoms commenced approximately 3-4 days ago  He was being treated for a UTI with antibiotics but developed shortness of breath and a cough that is nonproductive  No sick contacts  No recent travel  No chest pain nausea or vomiting  Review of Systems   Constitutional: Positive for fever  Respiratory: Positive for cough and shortness of breath  All other systems reviewed and are negative  Historical Information   Past Medical History:   Diagnosis Date    Back problem     Diabetes ([facility] )     History of depression     Hyperlipidemia     Hypertension     Multiple myeloma ([place] [address] )     Myocardial infarction [facility])      Past Surgical History:   Procedure Laterality Date    CARDIAC CATHETERIZATION      HERNIA REPAIR      CA COLONOSCOPY FLX DX W/COLLJ SPEC WHEN PFRMD N/A 11/16/2018    Procedure: COLONOSCOPY;  Surgeon: Faustino Peacock MD;  Location: [facility];   Service: Gastroenterology    TONSILLECTOMY      TONSILLECTOMY       Social History   Social History     Substance and Sexual Activity   Alcohol Use Yes    Frequency: Monthly or less    Drinks per session: 1 or 2    Binge frequency: Never    Comment: socially      Social History     Substance and Sexual Activity   Drug Use No     Social History     Tobacco Use   Smoking Status Never Smoker   Smokeless Tobacco Never Used     E-Cigarette Use: Never User     E-Cigarette/Vaping Substances    Nicotine No     THC No     CBD No     Flavoring No     Other No     Unknown No        Family History: non-contributory    Meds/Allergies   all medications and allergies reviewed   Allergies   Allergen Reactions    Iodinated Diagnostic Agents Anaphylaxis    Trilipix [Choline Fenofibrate]      Other reaction(s): jaundice  Other reaction(s): jaundice    Atorvastatin     Shellfish Allergy - Food Allergy        Objective   First Vitals:   Blood Pressure: 134/60 (05/29/21 0945)  Pulse: (!) 120 (05/29/21 0945)  Temperature: 99 °F (37 2 °C) (05/29/21 0945)  Temp Source: Oral (05/29/21 0945)  Respirations: (!) 28 (05/29/21 0945)  SpO2: 91 % (05/29/21 0945)    Current Vitals:   Blood Pressure: 129/67 (05/29/21 1434)  Pulse: 100 (05/29/21 1441)  Temperature: 98 °F (36 7 °C) (05/29/21 1441)  Temp Source: Oral (05/29/21 0945)  Respirations: 19 (05/29/21 1434)  SpO2: 95 % (05/29/21 1441)      Intake/Output Summary (Last 24 hours) at 5/29/2021 1441  Last data filed at 5/29/2021 1332  Gross per 24 hour   Intake 50 ml   Output --   Net 50 ml       Invasive Devices     Peripheral Intravenous Line            Peripheral IV 05/29/21 Right Arm less than 1 day                Physical Exam  Vitals signs and nursing note reviewed  Constitutional:       General: He is not in acute distress  Appearance: Normal appearance  HENT:      Head: Normocephalic and atraumatic  Nose: Nose normal  No congestion  Mouth/Throat:      Mouth: Mucous membranes are moist    Eyes:      Pupils: Pupils are equal, round, and reactive to light  Neck:      Musculoskeletal: Normal range of motion  No neck rigidity  Cardiovascular:      Rate and Rhythm: Regular rhythm  Tachycardia present  Pulmonary:      Effort: Pulmonary effort is normal  No respiratory distress  Breath sounds: Normal breath sounds  No stridor  Abdominal:      General: Abdomen is flat   Bowel sounds are normal    Musculoskeletal: Normal range of motion  General: No swelling  Skin:     General: Skin is warm  Capillary Refill: Capillary refill takes 2 to 3 seconds  Coloration: Skin is not jaundiced  Neurological:      General: No focal deficit present  Mental Status: He is alert and oriented to person, place, and time  Psychiatric:         Mood and Affect: Mood normal          Lab Results:   Lab Results   Component Value Date    WBC 4 60 05/29/2021    HGB 7 9 (L) 05/29/2021    HCT 24 7 (L) 05/29/2021    MCV 71 (L) 05/29/2021    PLT 68 (L) 05/29/2021     Lab Results   Component Value Date    SODIUM 133 (L) 05/29/2021    K 3 6 05/29/2021    CL 96 (L) 05/29/2021    CO2 24 05/29/2021    AGAP 13 05/29/2021    BUN 15 05/29/2021    CREATININE 1 43 (H) 05/29/2021    GLUC 136 05/29/2021    GLUF 163 (H) 05/04/2021    CALCIUM 8 2 (L) 05/29/2021    AST 13 05/29/2021    ALT 20 05/29/2021    ALKPHOS 247 (H) 05/29/2021    TP 6 6 05/29/2021    TBILI 1 90 (H) 05/29/2021    EGFR 52 05/29/2021       Imaging:   PE Study with CT abdomen & pelvis with contrast   Final Result         1  Alveolar consolidation in both lungs in a dependent fashion as described above most consistent with multifocal pneumonia  Small right pleural effusion  2   Suboptimal opacification of the pulmonary artery as described above with no evidence for large central pulmonary emboli  3   Hepatosplenomegaly  4   Additional findings as noted  The study was marked in [facility] for immediate notification                       Workstation performed: [id number]             EKG, Pathology, and Other Studies: Sinus Tach    Code Status: No Order  Advance Directive and Living Will:      Power of :    POLST:      Counseling / Coordination of Care:

## 2021-05-29 NOTE — ASSESSMENT & PLAN NOTE
· Patient presented to the ED with complaints of shortness of breath and dry cough  · CTA Chest CT Abd/Pelvis (5/29/21): 1  Alveolar consolidation in both lungs in a dependent fashion as described above most consistent with multifocal pneumonia  Small right pleural effusion  2   Suboptimal opacification of the pulmonary artery as described above with no evidence for large central pulmonary emboli  3   Hepatosplenomegaly  4   Additional findings as noted  · Patient meeting sepsis criteria on admission as evidenced by tachycardia, tachypnea  · Blood cultures were obtained x 2 in the emergency department  · Patient received vancomycin and cefepime in the emergency department  · Continue Rocephin and azithromycin  · Urine Legionella/Strep Pneumoniae, negative  · Sputum culture ordered; needs to be obtained  · MRSA Pending  · Initial Procalcitonin 0 62, repeat pending for today    · Supportive care in the form of antitussives and IVF

## 2021-05-29 NOTE — PLAN OF CARE
Problem: Potential for Falls  Goal: Patient will remain free of falls  Description: INTERVENTIONS:  - Assess patient frequently for physical needs  -  Identify cognitive and physical deficits and behaviors that affect risk of falls  -  [place] fall precautions as indicated by assessment   - Educate patient/family on patient safety including physical limitations  - Instruct patient to call for assistance with activity based on assessment  - Modify environment to reduce risk of injury  - Consider OT/PT consult to assist with strengthening/mobility  Outcome: Progressing     Problem: SAFETY ADULT  Goal: Patient will remain free of falls  Description: INTERVENTIONS:  - Assess patient frequently for physical needs  -  Identify cognitive and physical deficits and behaviors that affect risk of falls    -  [place] fall precautions as indicated by assessment   - Educate patient/family on patient safety including physical limitations  - Instruct patient to call for assistance with activity based on assessment  - Modify environment to reduce risk of injury  - Consider OT/PT consult to assist with strengthening/mobility  Outcome: Progressing

## 2021-05-29 NOTE — ASSESSMENT & PLAN NOTE
· Patient has an allergy to Lipitor and Crestor is non formulary, hold statin therapy while hospitalized

## 2021-05-29 NOTE — RESPIRATORY THERAPY NOTE
RT Protocol Note  Stephon De La Cruz 63 y o  male MRN: [id number]  Unit/Bed#: -01 Encounter: [id number]    Assessment    Principal Problem:    Sepsis due to pneumonia [facility])  Active Problems:    Essential hypertension    Multiple myeloma ([place] [address] )    Type 2 diabetes mellitus with kidney complication, without long-term current use of insulin (HCC)    Hyperlipidemia    Shortness of breath    Stage 3a chronic kidney disease (HCC)    CAD (coronary artery disease)    Microcytic anemia      Home Pulmonary Medications:  No home pulmonary medicine  Home Devices/Therapy: (no home oxygen)    Past Medical History:   Diagnosis Date    Back problem     Diabetes ([place] [address] )     History of depression     Hyperlipidemia     Hypertension     Multiple myeloma (HCC)     Myocardial infarction ([place] [address] )      Social History     Socioeconomic History    Marital status: /Civil Union     Spouse name: None    Number of children: None    Years of education: None    Highest education level: None   Occupational History    None   Social Needs    Financial resource strain: None    Food insecurity     Worry: None     Inability: None    Transportation needs     Medical: None     Non-medical: None   Tobacco Use    Smoking status: Never Smoker    Smokeless tobacco: Never Used   Substance and Sexual Activity    Alcohol use: Yes     Frequency: Monthly or less     Drinks per session: 1 or 2     Binge frequency: Never     Comment: socially     Drug use: No    Sexual activity: Yes   Lifestyle    Physical activity     Days per week: 0 days     Minutes per session: 0 min    Stress: Not at all   Relationships    Social connections     Talks on phone: None     Gets together: None     Attends [employer] service: None     Active member of club or organization: None     Attends meetings of clubs or organizations: None     Relationship status: None    Intimate partner violence     Fear of current or ex partner: None     Emotionally abused: None Physically abused: None     Forced sexual activity: None   Other Topics Concern    None   Social History Narrative    None       Subjective         Objective    Physical Exam:   Assessment Type: Assess only  General Appearance: Awake, Alert  Respiratory Pattern: Normal  Chest Assessment: Chest expansion symmetrical  Bilateral Breath Sounds: Diminished  Cough: Non-productive  O2 Device: 4 L NC    Vitals:  Blood pressure 129/67, pulse 100, temperature 98 °F (36 7 °C), resp  rate 19, SpO2 95 %  Imaging and other studies: I have personally reviewed pertinent reports        O2 Device: 4 L NC     Plan    Respiratory Plan: No distress/Pulmonary history  Airway Clearance Plan: Incentive Spirometer     Resp Comments: patient does not take home pulmonary medicine

## 2021-05-29 NOTE — ASSESSMENT & PLAN NOTE
Lab Results   Component Value Date    EGFR 52 05/29/2021    EGFR 55 05/04/2021    EGFR 67 02/16/2021    CREATININE 1 43 (H) 05/29/2021    CREATININE 1 35 (H) 05/04/2021    CREATININE 1 16 02/16/2021     · Baseline creatinine is 1 2-1 4  · Creatinine currently stable within baseline  · Monitor BMP in am   · Avoid nephrotoxins/hypotension

## 2021-05-29 NOTE — ASSESSMENT & PLAN NOTE
Lab Results   Component Value Date    HGBA1C 6 0 (H) 05/04/2021       No results for input(s): POCGLU in the last 72 hours  Blood Sugar Average: Last 72 hrs:    · Chronic; Well controlled as evidenced by hemoglobin A1C of 6 0  · Takes Glimperide and Januvia at home    · Continue Januvia 50 mg daily  · Diabetic diet  · Monitor blood glucose AC/HS   · Continue ISS

## 2021-05-30 LAB
ABO GROUP BLD: NORMAL
BASOPHILS # BLD AUTO: 0.01 THOUSANDS/ΜL (ref 0–0.1)
BASOPHILS NFR BLD AUTO: 0 % (ref 0–1)
BILIRUB DIRECT SERPL-MCNC: 0.91 MG/DL (ref 0–0.2)
BLD GP AB SCN SERPL QL: NEGATIVE
BLD SMEAR INTERP: NORMAL
EOSINOPHIL # BLD AUTO: 0.12 THOUSAND/ΜL (ref 0–0.61)
EOSINOPHIL NFR BLD AUTO: 3 % (ref 0–6)
ERYTHROCYTE [DISTWIDTH] IN BLOOD BY AUTOMATED COUNT: 18.2 % (ref 11.6–15.1)
FERRITIN SERPL-MCNC: 549 NG/ML (ref 8–388)
GLUCOSE SERPL-MCNC: 161 MG/DL (ref 65–140)
GLUCOSE SERPL-MCNC: 185 MG/DL (ref 65–140)
GLUCOSE SERPL-MCNC: 217 MG/DL (ref 65–140)
GLUCOSE SERPL-MCNC: 249 MG/DL (ref 65–140)
HCT VFR BLD AUTO: 21.1 % (ref 36.5–49.3)
HCT VFR BLD AUTO: 21.3 % (ref 36.5–49.3)
HCT VFR BLD AUTO: 24.1 % (ref 36.5–49.3)
HGB BLD-MCNC: 6.6 G/DL (ref 12–17)
HGB BLD-MCNC: 6.9 G/DL (ref 12–17)
HGB BLD-MCNC: 7.6 G/DL (ref 12–17)
IMM GRANULOCYTES # BLD AUTO: 0.05 THOUSAND/UL (ref 0–0.2)
IMM GRANULOCYTES NFR BLD AUTO: 1 % (ref 0–2)
IRON SATN MFR SERPL: 10 %
IRON SERPL-MCNC: 21 UG/DL (ref 65–175)
LYMPHOCYTES # BLD AUTO: 0.53 THOUSANDS/ΜL (ref 0.6–4.47)
LYMPHOCYTES NFR BLD AUTO: 13 % (ref 14–44)
MAGNESIUM SERPL-MCNC: 1.5 MG/DL (ref 1.6–2.6)
MCH RBC QN AUTO: 22.3 PG (ref 26.8–34.3)
MCHC RBC AUTO-ENTMCNC: 31.3 G/DL (ref 31.4–37.4)
MCV RBC AUTO: 71 FL (ref 82–98)
MONOCYTES # BLD AUTO: 0.35 THOUSAND/ΜL (ref 0.17–1.22)
MONOCYTES NFR BLD AUTO: 9 % (ref 4–12)
NEUTROPHILS # BLD AUTO: 2.9 THOUSANDS/ΜL (ref 1.85–7.62)
NEUTS SEG NFR BLD AUTO: 74 % (ref 43–75)
NRBC BLD AUTO-RTO: 0 /100 WBCS
PLATELET # BLD AUTO: 60 THOUSANDS/UL (ref 149–390)
PROCALCITONIN SERPL-MCNC: 0.44 NG/ML
RBC # BLD AUTO: 2.96 MILLION/UL (ref 3.88–5.62)
RH BLD: POSITIVE
SPECIMEN EXPIRATION DATE: NORMAL
TIBC SERPL-MCNC: 204 UG/DL (ref 250–450)
WBC # BLD AUTO: 3.96 THOUSAND/UL (ref 4.31–10.16)

## 2021-05-30 PROCEDURE — 82728 ASSAY OF FERRITIN: CPT | Performed by: FAMILY MEDICINE

## 2021-05-30 PROCEDURE — 99232 SBSQ HOSP IP/OBS MODERATE 35: CPT | Performed by: NURSE PRACTITIONER

## 2021-05-30 PROCEDURE — 83735 ASSAY OF MAGNESIUM: CPT | Performed by: FAMILY MEDICINE

## 2021-05-30 PROCEDURE — 83550 IRON BINDING TEST: CPT | Performed by: FAMILY MEDICINE

## 2021-05-30 PROCEDURE — 85018 HEMOGLOBIN: CPT | Performed by: NURSE PRACTITIONER

## 2021-05-30 PROCEDURE — 85014 HEMATOCRIT: CPT | Performed by: NURSE PRACTITIONER

## 2021-05-30 PROCEDURE — 85025 COMPLETE CBC W/AUTO DIFF WBC: CPT | Performed by: FAMILY MEDICINE

## 2021-05-30 PROCEDURE — 83010 ASSAY OF HAPTOGLOBIN QUANT: CPT | Performed by: NURSE PRACTITIONER

## 2021-05-30 PROCEDURE — 30233N1 TRANSFUSION OF NONAUTOLOGOUS RED BLOOD CELLS INTO PERIPHERAL VEIN, PERCUTANEOUS APPROACH: ICD-10-PCS | Performed by: INTERNAL MEDICINE

## 2021-05-30 PROCEDURE — 83540 ASSAY OF IRON: CPT | Performed by: FAMILY MEDICINE

## 2021-05-30 PROCEDURE — 86900 BLOOD TYPING SEROLOGIC ABO: CPT | Performed by: NURSE PRACTITIONER

## 2021-05-30 PROCEDURE — P9016 RBC LEUKOCYTES REDUCED: HCPCS

## 2021-05-30 PROCEDURE — 86901 BLOOD TYPING SEROLOGIC RH(D): CPT | Performed by: NURSE PRACTITIONER

## 2021-05-30 PROCEDURE — 84145 PROCALCITONIN (PCT): CPT | Performed by: EMERGENCY MEDICINE

## 2021-05-30 PROCEDURE — 82248 BILIRUBIN DIRECT: CPT | Performed by: NURSE PRACTITIONER

## 2021-05-30 PROCEDURE — 86850 RBC ANTIBODY SCREEN: CPT | Performed by: NURSE PRACTITIONER

## 2021-05-30 PROCEDURE — 94640 AIRWAY INHALATION TREATMENT: CPT

## 2021-05-30 PROCEDURE — 82948 REAGENT STRIP/BLOOD GLUCOSE: CPT

## 2021-05-30 PROCEDURE — 86923 COMPATIBILITY TEST ELECTRIC: CPT

## 2021-05-30 RX ORDER — HYDROMORPHONE HCL/PF 1 MG/ML
0.5 SYRINGE (ML) INJECTION EVERY 4 HOURS PRN
Status: DISCONTINUED | OUTPATIENT
Start: 2021-05-30 | End: 2021-06-02 | Stop reason: HOSPADM

## 2021-05-30 RX ORDER — OXYCODONE HYDROCHLORIDE 5 MG/1
5 TABLET ORAL EVERY 4 HOURS PRN
Status: DISCONTINUED | OUTPATIENT
Start: 2021-05-30 | End: 2021-06-02 | Stop reason: HOSPADM

## 2021-05-30 RX ORDER — OXYCODONE HYDROCHLORIDE 5 MG/1
2.5 TABLET ORAL EVERY 4 HOURS PRN
Status: DISCONTINUED | OUTPATIENT
Start: 2021-05-30 | End: 2021-06-02 | Stop reason: HOSPADM

## 2021-05-30 RX ORDER — MAGNESIUM SULFATE HEPTAHYDRATE 40 MG/ML
2 INJECTION, SOLUTION INTRAVENOUS ONCE
Status: COMPLETED | OUTPATIENT
Start: 2021-05-30 | End: 2021-05-30

## 2021-05-30 RX ORDER — KETOROLAC TROMETHAMINE 30 MG/ML
15 INJECTION, SOLUTION INTRAMUSCULAR; INTRAVENOUS ONCE
Status: DISCONTINUED | OUTPATIENT
Start: 2021-05-30 | End: 2021-06-02 | Stop reason: HOSPADM

## 2021-05-30 RX ORDER — IPRATROPIUM BROMIDE AND ALBUTEROL SULFATE 2.5; .5 MG/3ML; MG/3ML
3 SOLUTION RESPIRATORY (INHALATION)
Status: DISCONTINUED | OUTPATIENT
Start: 2021-05-30 | End: 2021-05-30

## 2021-05-30 RX ADMIN — BUPROPION 300 MG: 150 TABLET, EXTENDED RELEASE ORAL at 08:48

## 2021-05-30 RX ADMIN — ASPIRIN 81 MG: 81 TABLET, COATED ORAL at 08:42

## 2021-05-30 RX ADMIN — TAMSULOSIN HYDROCHLORIDE 0.8 MG: 0.4 CAPSULE ORAL at 16:55

## 2021-05-30 RX ADMIN — PANTOPRAZOLE SODIUM 40 MG: 40 TABLET, DELAYED RELEASE ORAL at 06:28

## 2021-05-30 RX ADMIN — ACYCLOVIR 400 MG: 800 TABLET ORAL at 08:43

## 2021-05-30 RX ADMIN — GABAPENTIN 100 MG: 100 CAPSULE ORAL at 16:55

## 2021-05-30 RX ADMIN — GABAPENTIN 100 MG: 100 CAPSULE ORAL at 08:42

## 2021-05-30 RX ADMIN — INSULIN LISPRO 3 UNITS: 100 INJECTION, SOLUTION INTRAVENOUS; SUBCUTANEOUS at 16:53

## 2021-05-30 RX ADMIN — DULOXETINE HYDROCHLORIDE 30 MG: 30 CAPSULE, DELAYED RELEASE ORAL at 08:41

## 2021-05-30 RX ADMIN — METOPROLOL TARTRATE 25 MG: 25 TABLET, FILM COATED ORAL at 21:52

## 2021-05-30 RX ADMIN — METOPROLOL TARTRATE 25 MG: 25 TABLET, FILM COATED ORAL at 08:41

## 2021-05-30 RX ADMIN — INSULIN LISPRO 1 UNITS: 100 INJECTION, SOLUTION INTRAVENOUS; SUBCUTANEOUS at 08:39

## 2021-05-30 RX ADMIN — SITAGLIPTIN 50 MG: 50 TABLET, FILM COATED ORAL at 08:42

## 2021-05-30 RX ADMIN — TRAZODONE HYDROCHLORIDE 50 MG: 50 TABLET ORAL at 08:42

## 2021-05-30 RX ADMIN — ALBUTEROL SULFATE 2.5 MG: 2.5 SOLUTION RESPIRATORY (INHALATION) at 12:40

## 2021-05-30 RX ADMIN — INSULIN LISPRO 2 UNITS: 100 INJECTION, SOLUTION INTRAVENOUS; SUBCUTANEOUS at 11:37

## 2021-05-30 RX ADMIN — LOSARTAN POTASSIUM 25 MG: 25 TABLET, FILM COATED ORAL at 08:42

## 2021-05-30 RX ADMIN — RIVAROXABAN 2.5 MG: 2.5 TABLET, FILM COATED ORAL at 08:43

## 2021-05-30 RX ADMIN — MAGNESIUM SULFATE HEPTAHYDRATE 2 G: 40 INJECTION, SOLUTION INTRAVENOUS at 10:14

## 2021-05-30 NOTE — ASSESSMENT & PLAN NOTE
· Continue Revlimid and outpatient follow-up with Hematology (follows at [address])  · Patient is on his 3rd line of MM treatment x 1 year

## 2021-05-30 NOTE — ASSESSMENT & PLAN NOTE
· Although this is chronic, baseline hemoglobin appears to be around 9 to 10,  · Hemoglobin on admission noted to be 7 9  · Patient with acute drop in hemoglobin to 6 6/6 9 yesterday  Patient is s/p 1 U PRBC with repeat hemoglobin of 7 6  Patient ultimately asymptomatic  · Iron panel completed; also noted to be iron deficient  · GI consult placed; so far, patient without signs of dark stools  · Xarelto stopped  Patient currently taking 2 5 mg Xarelto;   · Haptoglobin pending, direct bili 0 91, Hemolysis smear showing helmet cells    · Hematology consult pending, appreciate input

## 2021-05-30 NOTE — PROGRESS NOTES
Patient admitted for sepsis due to pneumonia; currently being treated with ABX  Also history of MM; controlled per patient  No signs of overt bleeding at this time, however, occult stool ordered for low hemoglobin  Patient afebrile  Being monitored closely  He is eating/drinking well without difficulties

## 2021-05-30 NOTE — CASE MANAGEMENT
LOS: 1  Readmission: none  Bundle: none  Unplanned Readmission Risk: 16 (green)    Patient admitted due to sepsis due to pneumonia; has history of multiple myeloma - currently getting transfusions and on 4L o2  Met with patient at bedside to complete case management assessment  Spouse, Oralia Christine, present and patient in agreement to complete assessment while she was there  Spouse answered majority of questions  Patient states that he lives at home with his spouse  Patient states that he's usually independent in all aspects  Patient currently on 4L o2 acute  Denies any DME in the home  No history of home care services or past rehab needs  PCP is Dr Betsy Dumont  Preferred pharmacy is [facility]  Spouse confirms patient has Rx policy - no issues obtaining medication  Patient denies having any advanced directives or living will  States that spouse, Oralia Christine, should be emergency contact  Does have adult children from a prior marriage - they do not live in the area  Patient can drive, but spouse does majority of the driving  Will be the one to pick-up patient from the hospital and takes him to all his appointments  Anticipate patient will be discharged home once medically stable; no needs identified at this time, but will continue to follow      KIRILL Phillip  5/30/2021  4:35 PM

## 2021-05-30 NOTE — PROGRESS NOTES
[address] [facility]     Progress Note Sulma Plata 1955, 63 y o  male MRN: [id number]  Unit/Bed#: -01 Encounter: [id number]  Primary Care Provider: Faustino Ryan MD   Date and time admitted to hospital: 5/29/2021  9:40 AM    * Sepsis due to pneumonia [facility])  Assessment & Plan  · Patient presented to the ED with complaints of shortness of breath and dry cough  · CTA Chest CT Abd/Pelvis (5/29/21): 1  Alveolar consolidation in both lungs in a dependent fashion as described above most consistent with multifocal pneumonia  Small right pleural effusion  2   Suboptimal opacification of the pulmonary artery as described above with no evidence for large central pulmonary emboli  3   Hepatosplenomegaly  4   Additional findings as noted  · Patient meeting sepsis criteria on admission as evidenced by tachycardia, tachypnea  · Blood cultures were obtained x 2 in the emergency department  · Patient received vancomycin and cefepime in the emergency department  · Continue Rocephin and azithromycin  · Urine Legionella/Strep Pneumoniae, negative  · Sputum culture ordered; needs to be obtained  · MRSA Pending  · Initial Procalcitonin 0 62, repeat pending for today  · Supportive care in the form of antitussives and IVF    Microcytic anemia  Assessment & Plan  · Although this is chronic, baseline hemoglobin appears to be around 9 to 10,  · Hemoglobin on admission noted to be 7 9  · Iron panel pending  · CBC pending for this morning      CAD (coronary artery disease)  Assessment & Plan  · With history of PCI  · Continue BB / aspirin / xarelto    Stage 3a chronic kidney disease [facility])  Assessment & Plan  Lab Results   Component Value Date    EGFR 52 05/29/2021    EGFR 55 05/04/2021    EGFR 67 02/16/2021    CREATININE 1 43 (H) 05/29/2021    CREATININE 1 35 (H) 05/04/2021    CREATININE 1 16 02/16/2021     · Baseline creatinine is 1 2-1 4  · Creatinine currently stable within baseline  · Monitor BMP in am   · Avoid nephrotoxins/hypotension  Shortness of breath  Assessment & Plan  · Likely in setting of multifocal pneumonia  · Respiratory protocol, airway clearance protocol  · Encourage incentive spirometry 10 times an hour  · Supplemental oxygen to maintain O2 sat greater than 92%    Hyperlipidemia  Assessment & Plan  · Patient has an allergy to Lipitor and Crestor is non formulary, hold statin therapy while hospitalized    Type 2 diabetes mellitus with kidney complication, without long-term current use of insulin ([facility] )  Assessment & Plan  Lab Results   Component Value Date    HGBA1C 6 0 (H) 05/04/2021       No results for input(s): POCGLU in the last 72 hours  Blood Sugar Average: Last 72 hrs:    · Chronic; Well controlled as evidenced by hemoglobin A1C of 6 0  · Takes Glimperide and Januvia at home  · Continue Januvia 50 mg daily  · Diabetic diet  · Monitor blood glucose AC/HS   · Continue ISS    Multiple myeloma (HCC)  Assessment & Plan  · Continue Revlimid and outpatient follow-up with Hematology    Essential hypertension  Assessment & Plan  · Chronic; continue home medications of Lopressor 25 mg b i d  and Cozaar 25 mg daily  · Monitor vital signs closely  VTE Pharmacologic Prophylaxis: VTE Score: 3 Moderate Risk (Score 3-4) - Pharmacological DVT Prophylaxis Ordered: rivaroxaban (Xarelto)  Patient Centered Rounds: I performed bedside rounds with nursing staff today  Discussions with Specialists or Other Care Team Provider:     Education and Discussions with Family / Patient: Updated  (wife) via phone  Time Spent for Care: 20 minutes  More than 50% of total time spent on counseling and coordination of care as described above  Current Length of Stay: 1 day(s)  Current Patient Status: Inpatient   Certification Statement: The patient will continue to require additional inpatient hospital stay due to ongoing treatment in setting of pneumonia    Discharge Plan: Anticipate discharge in 48 hrs to home  Code Status: Level 1 - Full Code    Subjective:   CC: "I'm still having some shortness of breath and some right sided pain "    Patient resting in the recliner  Denies complaints of chest pain, shortness of breath, fever or chills overnight  Reports some shortness of breath still but better than when he came in  Has some right sided thoracic pain with deep inspiration  Objective:     Vitals:   Temp (24hrs), Av 1 °F (36 7 °C), Min:98 °F (36 7 °C), Max:98 2 °F (36 8 °C)    Temp:  [98 °F (36 7 °C)-98 2 °F (36 8 °C)] 98 1 °F (36 7 °C)  HR:  [] 94  Resp:  [17-22] 17  BP: (123-129)/(59-68) 125/66  SpO2:  [91 %-98 %] 93 %  There is no height or weight on file to calculate BMI  Input and Output Summary (last 24 hours): Intake/Output Summary (Last 24 hours) at 2021 1201  Last data filed at 2021 2774  Gross per 24 hour   Intake 1560 ml   Output 1275 ml   Net 285 ml       Physical Exam:   Physical Exam  Vitals signs and nursing note reviewed  Constitutional:       General: He is not in acute distress  Appearance: He is not ill-appearing  Cardiovascular:      Rate and Rhythm: Normal rate  Pulses: Normal pulses  Heart sounds: Normal heart sounds  Pulmonary:      Effort: Pulmonary effort is normal       Breath sounds: Normal breath sounds  Abdominal:      General: Bowel sounds are normal       Palpations: Abdomen is soft  Musculoskeletal: Normal range of motion  Skin:     General: Skin is warm and dry  Capillary Refill: Capillary refill takes less than 2 seconds  Coloration: Skin is jaundiced (slight)  Neurological:      Mental Status: He is alert  Mental status is at baseline     Psychiatric:         Mood and Affect: Mood normal          Additional Data:     Labs:  Results from last 7 days   Lab Units 21  0959   WBC Thousand/uL 3 96*   HEMOGLOBIN g/dL 6 6*   HEMATOCRIT % 21 1*   PLATELETS Thousands/uL 60*   NEUTROS PCT % 74   LYMPHS PCT % 13*   MONOS PCT % 9   EOS PCT % 3     Results from last 7 days   Lab Units 05/29/21  1530 05/29/21  1044   SODIUM mmol/L 133* 133*   POTASSIUM mmol/L 3 8 3 6   CHLORIDE mmol/L 100 96*   CO2 mmol/L 24 24   BUN mg/dL 15 15   CREATININE mg/dL 1 34* 1 43*   ANION GAP mmol/L 9 13   CALCIUM mg/dL 7 9* 8 2*   ALBUMIN g/dL  --  2 1*   TOTAL BILIRUBIN mg/dL  --  1 90*   ALK PHOS U/L  --  247*   ALT U/L  --  20   AST U/L  --  13   GLUCOSE RANDOM mg/dL 130 136     Results from last 7 days   Lab Units 05/29/21  1044   INR  1 31*     Results from last 7 days   Lab Units 05/30/21  1117 05/30/21  0754 05/29/21  2142 05/29/21  1556   POC GLUCOSE mg/dl 217* 161* 156* 136         Results from last 7 days   Lab Units 05/29/21  1044   LACTIC ACID mmol/L 2 0   PROCALCITONIN ng/ml 0 62*       Lines/Drains:  Invasive Devices     Peripheral Intravenous Line            Peripheral IV 05/29/21 Right Arm 1 day                Imaging: No pertinent imaging reviewed  Recent Cultures (last 7 days):   Results from last 7 days   Lab Units 05/29/21  1559 05/29/21  1118 05/29/21  1044   BLOOD CULTURE   --  Received in Microbiology Lab  Culture in Progress  Received in Microbiology Lab  Culture in Progress     LEGIONELLA URINARY ANTIGEN  Negative  --   --        Last 24 Hours Medication List:   Current Facility-Administered Medications   Medication Dose Route Frequency Provider Last Rate    acetaminophen  650 mg Oral Q6H PRN Dillon Schuster MD      acyclovir  400 mg Oral Daily Edgar Larry MD      albuterol  2 5 mg Nebulization Q4H PRN Dillon Schuster MD      ALPRAZolam  1 mg Oral BID PRN Dillon Schuster MD      aspirin  81 mg Oral Daily Edgar Larry MD      benzonatate  200 mg Oral TID PRN Dillon Schuster MD      buPROPion  300 mg Oral Daily Edgar Larry MD      DULoxetine  30 mg Oral Daily Dillon Schuster MD      gabapentin  100 mg Oral BID Dillon Schuster MD      HYDROmorphone  0 5 mg Intravenous Q4H PRN KEKE Orosco      insulin lispro  1-6 Units Subcutaneous TID AC Edgar Larry MD      ipratropium-albuterol  3 mL Nebulization Q6H KEKE Hathaway      ketorolac  15 mg Intravenous Once KEKE Hathaway      lenalidomide  5 mg Oral Daily Edgar Larry MD      losartan  25 mg Oral Daily Edgar Larry MD      magnesium sulfate  2 g Intravenous Once KEKE Hathaway 2 g (05/30/21 1014)    metoprolol tartrate  25 mg Oral Q12H [address] Edgar Larry MD      oxyCODONE  2 5 mg Oral Q4H PRN KEKE Hathaway      oxyCODONE  5 mg Oral Q4H PRN KEKE Hathaway      pantoprazole  40 mg Oral Early Morning Edgar Larry MD      rivaroxaban  2 5 mg Oral BID Jessica Meredith MD      sildenafil  20 mg Oral TID Jessica Meredith MD      sitaGLIPtin  50 mg Oral Daily Jessica Meredith MD      tamsulosin  0 8 mg Oral Daily With Arun Chance MD      traZODone  50 mg Oral Daily Jessica Meredith MD          Today, Patient Was Seen By: KEKE Hathaway    **Please Note: This note may have been constructed using a voice recognition system  **

## 2021-05-31 PROBLEM — R17 SERUM TOTAL BILIRUBIN ELEVATED: Status: ACTIVE | Noted: 2021-05-31

## 2021-05-31 PROBLEM — J96.01 ACUTE RESPIRATORY FAILURE WITH HYPOXIA (HCC): Status: ACTIVE | Noted: 2021-05-29

## 2021-05-31 PROBLEM — R74.01 TRANSAMINITIS: Status: ACTIVE | Noted: 2021-05-31

## 2021-05-31 LAB
ABO GROUP BLD BPU: NORMAL
ALBUMIN SERPL BCP-MCNC: 1.7 G/DL (ref 3.5–5)
ALP SERPL-CCNC: 189 U/L (ref 46–116)
ALT SERPL W P-5'-P-CCNC: 22 U/L (ref 12–78)
ANION GAP SERPL CALCULATED.3IONS-SCNC: 10 MMOL/L (ref 4–13)
AST SERPL W P-5'-P-CCNC: 17 U/L (ref 5–45)
BILIRUB SERPL-MCNC: 1.23 MG/DL (ref 0.2–1)
BPU ID: NORMAL
BUN SERPL-MCNC: 16 MG/DL (ref 5–25)
CALCIUM ALBUM COR SERPL-MCNC: 9.6 MG/DL (ref 8.3–10.1)
CALCIUM SERPL-MCNC: 7.8 MG/DL (ref 8.3–10.1)
CHLORIDE SERPL-SCNC: 103 MMOL/L (ref 100–108)
CO2 SERPL-SCNC: 23 MMOL/L (ref 21–32)
CREAT SERPL-MCNC: 1.2 MG/DL (ref 0.6–1.3)
CROSSMATCH: NORMAL
ERYTHROCYTE [DISTWIDTH] IN BLOOD BY AUTOMATED COUNT: 19.5 % (ref 11.6–15.1)
GFR SERPL CREATININE-BSD FRML MDRD: 64 ML/MIN/1.73SQ M
GLUCOSE SERPL-MCNC: 157 MG/DL (ref 65–140)
GLUCOSE SERPL-MCNC: 175 MG/DL (ref 65–140)
GLUCOSE SERPL-MCNC: 201 MG/DL (ref 65–140)
GLUCOSE SERPL-MCNC: 214 MG/DL (ref 65–140)
GLUCOSE SERPL-MCNC: 237 MG/DL (ref 65–140)
HCT VFR BLD AUTO: 22.1 % (ref 36.5–49.3)
HCT VFR BLD AUTO: 23 % (ref 36.5–49.3)
HEMOCCULT STL QL: NEGATIVE
HGB BLD-MCNC: 7.1 G/DL (ref 12–17)
HGB BLD-MCNC: 7.5 G/DL (ref 12–17)
MCH RBC QN AUTO: 23.5 PG (ref 26.8–34.3)
MCHC RBC AUTO-ENTMCNC: 32.1 G/DL (ref 31.4–37.4)
MCV RBC AUTO: 73 FL (ref 82–98)
MRSA NOSE QL CULT: NORMAL
PLATELET # BLD AUTO: 57 THOUSANDS/UL (ref 149–390)
POTASSIUM SERPL-SCNC: 3.5 MMOL/L (ref 3.5–5.3)
PROT SERPL-MCNC: 5.6 G/DL (ref 6.4–8.2)
RBC # BLD AUTO: 3.02 MILLION/UL (ref 3.88–5.62)
SODIUM SERPL-SCNC: 136 MMOL/L (ref 136–145)
UNIT DISPENSE STATUS: NORMAL
UNIT PRODUCT CODE: NORMAL
UNIT RH: NORMAL
WBC # BLD AUTO: 3.71 THOUSAND/UL (ref 4.31–10.16)

## 2021-05-31 PROCEDURE — 82272 OCCULT BLD FECES 1-3 TESTS: CPT | Performed by: FAMILY MEDICINE

## 2021-05-31 PROCEDURE — 82948 REAGENT STRIP/BLOOD GLUCOSE: CPT

## 2021-05-31 PROCEDURE — 99232 SBSQ HOSP IP/OBS MODERATE 35: CPT | Performed by: NURSE PRACTITIONER

## 2021-05-31 PROCEDURE — 85018 HEMOGLOBIN: CPT | Performed by: NURSE PRACTITIONER

## 2021-05-31 PROCEDURE — 94664 DEMO&/EVAL PT USE INHALER: CPT

## 2021-05-31 PROCEDURE — 87070 CULTURE OTHR SPECIMN AEROBIC: CPT | Performed by: FAMILY MEDICINE

## 2021-05-31 PROCEDURE — 94760 N-INVAS EAR/PLS OXIMETRY 1: CPT

## 2021-05-31 PROCEDURE — 99222 1ST HOSP IP/OBS MODERATE 55: CPT | Performed by: INTERNAL MEDICINE

## 2021-05-31 PROCEDURE — 85027 COMPLETE CBC AUTOMATED: CPT | Performed by: NURSE PRACTITIONER

## 2021-05-31 PROCEDURE — 80053 COMPREHEN METABOLIC PANEL: CPT | Performed by: NURSE PRACTITIONER

## 2021-05-31 PROCEDURE — 87205 SMEAR GRAM STAIN: CPT | Performed by: FAMILY MEDICINE

## 2021-05-31 PROCEDURE — 85014 HEMATOCRIT: CPT | Performed by: NURSE PRACTITIONER

## 2021-05-31 RX ADMIN — ASPIRIN 81 MG: 81 TABLET, COATED ORAL at 08:03

## 2021-05-31 RX ADMIN — GABAPENTIN 100 MG: 100 CAPSULE ORAL at 17:23

## 2021-05-31 RX ADMIN — PANTOPRAZOLE SODIUM 40 MG: 40 TABLET, DELAYED RELEASE ORAL at 06:28

## 2021-05-31 RX ADMIN — METOPROLOL TARTRATE 25 MG: 25 TABLET, FILM COATED ORAL at 21:30

## 2021-05-31 RX ADMIN — INSULIN LISPRO 1 UNITS: 100 INJECTION, SOLUTION INTRAVENOUS; SUBCUTANEOUS at 08:02

## 2021-05-31 RX ADMIN — ACYCLOVIR 400 MG: 800 TABLET ORAL at 08:04

## 2021-05-31 RX ADMIN — GABAPENTIN 100 MG: 100 CAPSULE ORAL at 08:03

## 2021-05-31 RX ADMIN — TAMSULOSIN HYDROCHLORIDE 0.8 MG: 0.4 CAPSULE ORAL at 17:23

## 2021-05-31 RX ADMIN — INSULIN LISPRO 3 UNITS: 100 INJECTION, SOLUTION INTRAVENOUS; SUBCUTANEOUS at 11:51

## 2021-05-31 RX ADMIN — TRAZODONE HYDROCHLORIDE 50 MG: 50 TABLET ORAL at 08:03

## 2021-05-31 RX ADMIN — SITAGLIPTIN 50 MG: 50 TABLET, FILM COATED ORAL at 08:03

## 2021-05-31 RX ADMIN — METOPROLOL TARTRATE 25 MG: 25 TABLET, FILM COATED ORAL at 08:03

## 2021-05-31 RX ADMIN — BUPROPION 300 MG: 150 TABLET, EXTENDED RELEASE ORAL at 08:03

## 2021-05-31 RX ADMIN — LOSARTAN POTASSIUM 25 MG: 25 TABLET, FILM COATED ORAL at 08:03

## 2021-05-31 RX ADMIN — DULOXETINE HYDROCHLORIDE 30 MG: 30 CAPSULE, DELAYED RELEASE ORAL at 08:03

## 2021-05-31 RX ADMIN — INSULIN LISPRO 2 UNITS: 100 INJECTION, SOLUTION INTRAVENOUS; SUBCUTANEOUS at 17:22

## 2021-05-31 NOTE — CONSULTS
Consultation - [address] [facility]  Bobby Ribeiro 63 y o  male MRN: [id number]  Unit/Bed#: -01 Encounter: [id number]        Consults    Reason for Consult / Principal Problem:     Anemia      ASSESSMENT AND PLAN:      1  Anemia    Patient had drop in hemoglobin from baseline of 9-10 to 7 on admission  Patient had colonoscopy in 2018 which revealed diverticulosis but was otherwise normal   Patient had upper endoscopy several years prior  He does have known cause of anemia with multiple myeloma and thalassemia however given the acute drop would be worth repeating endoscopic evaluation  Will plan for upper endoscopy and colonoscopy later this admission or as an outpatient depending on how he does from the respiratory standpoint  Patient and wife are agreeable with plan   ______________________________________________________________________    HPI:  72-year-old male with history of multiple myeloma, thalassemia, CKD, CAD with history of PCI on Xarelto, hyperlipidemia presents with shortness of breath  CT on admission revealed consolidations consistent with multifocal pneumonia and small right pleural effusion  Patient was started on antibiotics Rocephin and azithromycin on admission  Blood cultures have remained negative  Patient does feel better overall since yesterday  He does not feel overly tired or uncomfortable  He has not noted any blood in his stool or notes any other source of anemia  He had colonoscopy in 2018 with Dr Lashawn Gutierrez which was significant only for pandiverticulosis  REVIEW OF SYSTEMS:    CONSTITUTIONAL: Denies any fever, chills, rigors, and weight loss  HEENT: No earache or tinnitus  Denies hearing loss or visual disturbances  CARDIOVASCULAR: No chest pain or palpitations  RESPIRATORY: Denies any cough, hemoptysis, shortness of breath or dyspnea on exertion  GASTROINTESTINAL: As noted in the History of Present Illness     GENITOURINARY: No problems with urination  Denies any hematuria or dysuria  NEUROLOGIC: No dizziness or vertigo, denies headaches  MUSCULOSKELETAL: Denies any muscle or joint pain  SKIN: Denies skin rashes or itching  ENDOCRINE: Denies excessive thirst  Denies intolerance to heat or cold  PSYCHOSOCIAL: Denies depression or anxiety  Denies any recent memory loss  Historical Information   Past Medical History:   Diagnosis Date    Back problem     Diabetes [facility] )     History of depression     Hyperlipidemia     Hypertension     Multiple myeloma ([facility] )     Myocardial infarction [facility])      Past Surgical History:   Procedure Laterality Date    CARDIAC CATHETERIZATION      HERNIA REPAIR      KS COLONOSCOPY FLX DX W/COLLJ SPEC WHEN PFRMD N/A 11/16/2018    Procedure: COLONOSCOPY;  Surgeon: Naomi Castro MD;  Location: [facility];   Service: Gastroenterology    TONSILLECTOMY      TONSILLECTOMY       Social History   Social History     Substance and Sexual Activity   Alcohol Use Yes    Frequency: Monthly or less    Drinks per session: 1 or 2    Binge frequency: Never    Comment: socially      Social History     Substance and Sexual Activity   Drug Use No     Social History     Tobacco Use   Smoking Status Never Smoker   Smokeless Tobacco Never Used     Family History   Problem Relation Age of Onset    Heart disease Father     Diabetes Brother        Meds/Allergies     Medications Prior to Admission   Medication    acyclovir (ZOVIRAX) 400 MG tablet    ALPRAZolam (XANAX) 1 mg tablet    aspirin (ECOTRIN LOW STRENGTH) 81 mg EC tablet    benzonatate (TESSALON) 200 MG capsule    buPROPion (WELLBUTRIN SR) 100 mg 12 hr tablet    cholecalciferol (VITAMIN D3) 1,000 units tablet    colesevelam (WELCHOL) 625 mg tablet    cyanocobalamin (VITAMIN B-12) 1,000 mcg tablet    DULoxetine (CYMBALTA) 30 mg delayed release capsule    DULoxetine (CYMBALTA) 60 mg delayed release capsule    glimepiride (AMARYL) 1 mg tablet    JANUMET XR  MG TB24    lenalidomide (REVLIMID) 5 MG CAPS    losartan (COZAAR) 25 mg tablet    metoprolol tartrate (LOPRESSOR) 25 mg tablet    nitroglycerin (NITROSTAT) 0 4 mg SL tablet    omeprazole (PriLOSEC) 20 mg delayed release capsule    rosuvastatin (CRESTOR) 5 mg tablet    sildenafil (REVATIO) 20 mg tablet    tamsulosin (FLOMAX) 0 4 mg    traZODone (DESYREL) 50 mg tablet    Vascepa 1 g CAPS    Xarelto 2 5 MG tablet    [] cephalexin (KEFLEX) 500 mg capsule    gabapentin (NEURONTIN) 300 mg capsule     Current Facility-Administered Medications   Medication Dose Route Frequency    acetaminophen (TYLENOL) tablet 650 mg  650 mg Oral Q6H PRN    acyclovir (ZOVIRAX) tablet 400 mg  400 mg Oral Daily    albuterol inhalation solution 2 5 mg  2 5 mg Nebulization Q4H PRN    ALPRAZolam (XANAX) tablet 1 mg  1 mg Oral BID PRN    aspirin (ECOTRIN LOW STRENGTH) EC tablet 81 mg  81 mg Oral Daily    benzonatate (TESSALON PERLES) capsule 200 mg  200 mg Oral TID PRN    buPROPion (WELLBUTRIN XL) 24 hr tablet 300 mg  300 mg Oral Daily    DULoxetine (CYMBALTA) delayed release capsule 30 mg  30 mg Oral Daily    gabapentin (NEURONTIN) capsule 100 mg  100 mg Oral BID    HYDROmorphone (DILAUDID) injection 0 5 mg  0 5 mg Intravenous Q4H PRN    insulin lispro (HumaLOG) 100 units/mL subcutaneous injection 1-6 Units  1-6 Units Subcutaneous TID AC    ketorolac (TORADOL) injection 15 mg  15 mg Intravenous Once    lenalidomide (REVLIMID) capsule 5 mg  5 mg Oral Daily    losartan (COZAAR) tablet 25 mg  25 mg Oral Daily    metoprolol tartrate (LOPRESSOR) tablet 25 mg  25 mg Oral Q12H VELMA    oxyCODONE (ROXICODONE) IR tablet 2 5 mg  2 5 mg Oral Q4H PRN    oxyCODONE (ROXICODONE) IR tablet 5 mg  5 mg Oral Q4H PRN    pantoprazole (PROTONIX) EC tablet 40 mg  40 mg Oral Early Morning    sitaGLIPtin (JANUVIA) tablet 50 mg  50 mg Oral Daily    tamsulosin (FLOMAX) capsule 0 8 mg  0 8 mg Oral Daily With Vigiglobe traZODone (DESYREL) tablet 50 mg  50 mg Oral Daily       Allergies   Allergen Reactions    Iodinated Diagnostic Agents Anaphylaxis    Trilipix [Choline Fenofibrate]      Other reaction(s): jaundice  Other reaction(s): jaundice    Atorvastatin     Shellfish Allergy - Food Allergy            Objective     Blood pressure 135/73, pulse 96, temperature 98 1 °F (36 7 °C), resp  rate 18, height 5' 9" (1 753 m), weight 90 1 kg (198 lb 10 2 oz), SpO2 91 %  Body mass index is 29 33 kg/m²  Intake/Output Summary (Last 24 hours) at 5/31/2021 1336  Last data filed at 5/31/2021 0630  Gross per 24 hour   Intake 611 67 ml   Output 325 ml   Net 286 67 ml         PHYSICAL EXAM:      General Appearance:   Alert, cooperative, no distress   HEENT:   Normocephalic, atraumatic, anicteric      Neck:  Supple, symmetrical, trachea midline   Lungs:   Clear to auscultation bilaterally; no rales, rhonchi or wheezing; respirations unlabored    Heart[de-identified]   Regular rate and rhythm; no murmur, rub, or gallop     Abdomen:   Soft, non-tender, non-distended; normal bowel sounds; no masses, no organomegaly    Genitalia:   Deferred    Rectal:   Deferred    Extremities:  No cyanosis, clubbing or edema    Pulses:  2+ and symmetric all extremities    Skin:  No jaundice, rashes, or lesions    Lymph nodes:  No palpable cervical lymphadenopathy        Lab Results:   Admission on 05/29/2021   Component Date Value    WBC 05/29/2021 4 60     RBC 05/29/2021 3 50*    Hemoglobin 05/29/2021 7 9*    Hematocrit 05/29/2021 24 7*    MCV 05/29/2021 71*    MCH 05/29/2021 22 6*    MCHC 05/29/2021 32 0     RDW 05/29/2021 18 3*    Platelets 79/99/7630 68*    nRBC 05/29/2021 0     Neutrophils Relative 05/29/2021 78*    Immat GRANS % 05/29/2021 1     Lymphocytes Relative 05/29/2021 11*    Monocytes Relative 05/29/2021 9     Eosinophils Relative 05/29/2021 1     Basophils Relative 05/29/2021 0     Neutrophils Absolute 05/29/2021 3 55     Immature Grans Absolute 05/29/2021 0 06     Lymphocytes Absolute 05/29/2021 0 51*    Monocytes Absolute 05/29/2021 0 41     Eosinophils Absolute 05/29/2021 0 06     Basophils Absolute 05/29/2021 0 01     Sodium 05/29/2021 133*    Potassium 05/29/2021 3 6     Chloride 05/29/2021 96*    CO2 05/29/2021 24     ANION GAP 05/29/2021 13     BUN 05/29/2021 15     Creatinine 05/29/2021 1 43*    Glucose 05/29/2021 136     Calcium 05/29/2021 8 2*    Corrected Calcium 05/29/2021 9 7     AST 05/29/2021 13     ALT 05/29/2021 20     Alkaline Phosphatase 05/29/2021 247*    Total Protein 05/29/2021 6 6     Albumin 05/29/2021 2 1*    Total Bilirubin 05/29/2021 1 90*    eGFR 05/29/2021 52     LACTIC ACID 05/29/2021 2 0     Procalcitonin 05/29/2021 0 62*    Protime 05/29/2021 15 6*    INR 05/29/2021 1 31*    PTT 05/29/2021 40*    Blood Culture 05/29/2021 No Growth at 24 hrs   Blood Culture 05/29/2021 No Growth at 24 hrs       Color, UA 05/29/2021 Yellow     Clarity, UA 05/29/2021 Clear     Specific Gravity, UA 05/29/2021 1 010     pH, UA 05/29/2021 5 5     Leukocytes, UA 05/29/2021 Negative     Nitrite, UA 05/29/2021 Negative     Protein, UA 05/29/2021 Trace*    Glucose, UA 05/29/2021 100 (1/10%)*    Ketones, UA 05/29/2021 15 (1+)*    Urobilinogen, UA 05/29/2021 1 0     Bilirubin, UA 05/29/2021 Negative     Blood, UA 05/29/2021 Trace-Intact*    Troponin I 05/29/2021 <0 02     NT-proBNP 05/29/2021 337*    SARS-CoV-2 05/29/2021 Negative     RBC, UA 05/29/2021 0-1     WBC, UA 05/29/2021 None Seen     Epithelial Cells 05/29/2021 None Seen     Bacteria, UA 05/29/2021 None Seen     MRSA Culture Only 05/29/2021 No Methicillin Resistant Staphlyococcus aureus (MRSA) isolated     Legionella Urinary Antig* 05/29/2021 Negative     Strep pneumoniae antigen* 05/29/2021 Negative     Sodium 05/29/2021 133*    Potassium 05/29/2021 3 8     Chloride 05/29/2021 100     CO2 05/29/2021 24     ANION GAP 05/29/2021 9  BUN 05/29/2021 15     Creatinine 05/29/2021 1 34*    Glucose 05/29/2021 130     Calcium 05/29/2021 7 9*    eGFR 05/29/2021 56     POC Glucose 05/29/2021 136     POC Glucose 05/29/2021 156*    Procalcitonin 05/30/2021 0 44*    WBC 05/30/2021 3 96*    RBC 05/30/2021 2 96*    Hemoglobin 05/30/2021 6 6*    Hematocrit 05/30/2021 21 1*    MCV 05/30/2021 71*    MCH 05/30/2021 22 3*    MCHC 05/30/2021 31 3*    RDW 05/30/2021 18 2*    Platelets 94/43/8915 60*    nRBC 05/30/2021 0     Neutrophils Relative 05/30/2021 74     Immat GRANS % 05/30/2021 1     Lymphocytes Relative 05/30/2021 13*    Monocytes Relative 05/30/2021 9     Eosinophils Relative 05/30/2021 3     Basophils Relative 05/30/2021 0     Neutrophils Absolute 05/30/2021 2 90     Immature Grans Absolute 05/30/2021 0 05     Lymphocytes Absolute 05/30/2021 0 53*    Monocytes Absolute 05/30/2021 0 35     Eosinophils Absolute 05/30/2021 0 12     Basophils Absolute 05/30/2021 0 01     Magnesium 05/30/2021 1 5*    Iron Saturation 05/30/2021 10     TIBC 05/30/2021 204*    Iron 05/30/2021 21*    Ferritin 05/30/2021 549*    POC Glucose 05/30/2021 161*    POC Glucose 05/30/2021 217*    ABO Grouping 05/30/2021 O     Rh Factor 05/30/2021 Positive     Antibody Screen 05/30/2021 Negative     Specimen Expiration Date 05/30/2021 [id number]     Hemoglobin 05/30/2021 6 9*    Hematocrit 05/30/2021 21 3*    Unit Product Code 05/31/2021 [id number]     Unit Number 05/31/2021 [id number]     Unit ABO 05/31/2021 O     Unit RH 05/31/2021 POS     Crossmatch 05/31/2021 Compatible     Unit Dispense Status 05/31/2021 Presumed Trans     Hemolysis Smear 05/30/2021 Helmet Cells noted     Bilirubin, Direct 05/30/2021 0 91*    Hemoglobin 05/30/2021 7 6*    Hematocrit 05/30/2021 24 1*    POC Glucose 05/30/2021 249*    POC Glucose 05/30/2021 185*    WBC 05/31/2021 3 71*    RBC 05/31/2021 3 02*    Hemoglobin 05/31/2021 7 1*    Hematocrit 05/31/2021 22 1*    MCV 05/31/2021 73*    MCH 05/31/2021 23 5*    MCHC 05/31/2021 32 1     RDW 05/31/2021 19 5*    Platelets 00/68/0908 57*    Sodium 05/31/2021 136     Potassium 05/31/2021 3 5     Chloride 05/31/2021 103     CO2 05/31/2021 23     ANION GAP 05/31/2021 10     BUN 05/31/2021 16     Creatinine 05/31/2021 1 20     Glucose 05/31/2021 157*    Calcium 05/31/2021 7 8*    Corrected Calcium 05/31/2021 9 6     AST 05/31/2021 17     ALT 05/31/2021 22     Alkaline Phosphatase 05/31/2021 189*    Total Protein 05/31/2021 5 6*    Albumin 05/31/2021 1 7*    Total Bilirubin 05/31/2021 1 23*    eGFR 05/31/2021 64     POC Glucose 05/31/2021 175*    POC Glucose 05/31/2021 237*       Imaging Studies: I have personally reviewed pertinent imaging studies

## 2021-05-31 NOTE — ASSESSMENT & PLAN NOTE
Lab Results   Component Value Date    HGBA1C 6 0 (H) 05/04/2021       Recent Labs     05/30/21  0754 05/30/21  1117 05/30/21  1537 05/30/21 2045   POCGLU 161* 217* 249* 185*       Blood Sugar Average: Last 72 hrs:    · Chronic; Well controlled as evidenced by hemoglobin A1C of 6 0  · Takes Glimperide and Januvia at home    · Continue Januvia 50 mg daily  · Diabetic diet  · Monitor blood glucose AC/HS   · Continue ISS

## 2021-05-31 NOTE — ASSESSMENT & PLAN NOTE
· Chronic, likely as a result of MM  · Patient noted to have a platelet count of 56 on admission  · Appears that most recent blood draw outpatient noted platelets at 70  · No signs of petechiae noted or active bleeding  · Monitor CBC

## 2021-05-31 NOTE — ASSESSMENT & PLAN NOTE
Lab Results   Component Value Date    EGFR 64 05/31/2021    EGFR 56 05/29/2021    EGFR 52 05/29/2021    CREATININE 1 20 05/31/2021    CREATININE 1 34 (H) 05/29/2021    CREATININE 1 43 (H) 05/29/2021     · Baseline creatinine is 1 2-1 4  · Creatinine currently stable within baseline, 1 2 today  · Monitor BMP in am   · Avoid nephrotoxins/hypotension

## 2021-05-31 NOTE — ASSESSMENT & PLAN NOTE
· Present on admission, as evidenced by a total bilirubin level of 1 90  · Patient without abdominal symptoms  · Noted to be trending down today to 1 23  · Alk phos also elevated on admission to 247, trending down to 189 today

## 2021-05-31 NOTE — PROGRESS NOTES
[address]     Progress Note Mazin Bryant 1955, 63 y o  male MRN: [id number]  Unit/Bed#: -01 Encounter: [id number]  Primary Care Provider: Crissie Habermann, MD   Date and time admitted to hospital: 5/29/2021  9:40 AM    * Sepsis due to pneumonia [facility])  Assessment & Plan  · Patient presented to the ED with complaints of shortness of breath and dry cough  · CTA Chest CT Abd/Pelvis (5/29/21): 1  Alveolar consolidation in both lungs in a dependent fashion as described above most consistent with multifocal pneumonia  Small right pleural effusion  2   Suboptimal opacification of the pulmonary artery as described above with no evidence for large central pulmonary emboli  3   Hepatosplenomegaly  4   Additional findings as noted  · Patient meeting sepsis criteria on admission as evidenced by tachycardia, tachypnea  · Blood cultures were obtained x 2 in the emergency department  · Patient received vancomycin and cefepime in the emergency department  · Continue Rocephin and azithromycin  · Urine Legionella/Strep Pneumoniae, negative  · Sputum culture ordered; needs to be obtained  · MRSA Pending  · Initial Procalcitonin 0 62, repeat 0 44 yesterday  Patient afebrile  · Blood cultures negative x 24 hrs  · Patient requiring supplemental oxygen to maintain oxygen saturation levels in the mid 90s; wean as able to maintain oxygen saturation levels 92% or better  Serum total bilirubin elevated  Assessment & Plan  · Present on admission, as evidenced by a total bilirubin level of 1 90  · Patient without abdominal symptoms  · Noted to be trending down today to 1 23  · Alk phos also elevated on admission to 247, trending down to 189 today  Microcytic anemia  Assessment & Plan  · Although this is chronic, baseline hemoglobin appears to be around 9 to 10,  · Hemoglobin on admission noted to be 7 9  · Patient with acute drop in hemoglobin to 6 6/6 9 yesterday    Patient is s/p 1 U PRBC with repeat hemoglobin of 7 6  Patient ultimately asymptomatic  · Iron panel completed; also noted to be iron deficient  · GI consult placed; so far, patient without signs of dark stools  · Xarelto stopped  Patient currently taking 2 5 mg Xarelto;   · Haptoglobin pending, direct bili 0 91, Hemolysis smear showing helmet cells  · Hematology consult pending, appreciate input    CAD (coronary artery disease)  Assessment & Plan  · With history of PCI  · Continue BB / aspirin  · Xarelto stopped given acute anemia; patient has history of seeing Dr Dangelo Ayala, however, unable to find any recent office visit notes  Stage 3a chronic kidney disease [facility])  Assessment & Plan  Lab Results   Component Value Date    EGFR 64 05/31/2021    EGFR 56 05/29/2021    EGFR 52 05/29/2021    CREATININE 1 20 05/31/2021    CREATININE 1 34 (H) 05/29/2021    CREATININE 1 43 (H) 05/29/2021     · Baseline creatinine is 1 2-1 4  · Creatinine currently stable within baseline, 1 2 today  · Monitor BMP in am   · Avoid nephrotoxins/hypotension  Acute respiratory failure with hypoxia (HCC)  Assessment & Plan  · Likely in setting of multifocal pneumonia  · Respiratory protocol, airway clearance protocol  · Encourage incentive spirometry 10 times an hour  · Supplemental oxygen to maintain O2 sat greater than 92%    Thrombocytopenia (HCC)  Assessment & Plan  · Chronic, likely as a result of MM  · Patient noted to have a platelet count of 56 on admission  · Appears that most recent blood draw outpatient noted platelets at 70  · No signs of petechiae noted or active bleeding  · Monitor CBC      Hyperlipidemia  Assessment & Plan  · Patient has an allergy to Lipitor and Crestor is non formulary, hold statin therapy while hospitalized    Type 2 diabetes mellitus with kidney complication, without long-term current use of insulin [facility])  Assessment & Plan  Lab Results   Component Value Date    HGBA1C 6 0 (H) 05/04/2021       Recent Labs 05/30/21  0754 05/30/21  1117 05/30/21  1537 05/30/21  2045   POCGLU 161* 217* 249* 185*       Blood Sugar Average: Last 72 hrs:    · Chronic; Well controlled as evidenced by hemoglobin A1C of 6 0  · Takes Glimperide and Januvia at home  · Continue Januvia 50 mg daily  · Diabetic diet  · Monitor blood glucose AC/HS   · Continue ISS    Multiple myeloma (HCC)  Assessment & Plan  · Continue Revlimid and outpatient follow-up with Hematology (follows at [address])  · Patient is on his 3rd line of MM treatment x 1 year  Essential hypertension  Assessment & Plan  · Chronic; continue home medications of Lopressor 25 mg b i d  and Cozaar 25 mg daily  · Monitor vital signs closely  VTE Pharmacologic Prophylaxis: VTE Score: 3 Moderate Risk (Score 3-4) - Pharmacological DVT Prophylaxis Contraindicated  Sequential Compression Devices Ordered  Patient Centered Rounds: I performed bedside rounds with nursing staff today  Discussions with Specialists or Other Care Team Provider:     Education and Discussions with Family / Patient: Updated  (wife) via phone  Time Spent for Care: 20 minutes  More than 50% of total time spent on counseling and coordination of care as described above  Current Length of Stay: 2 day(s)  Current Patient Status: Inpatient   Certification Statement: The patient will continue to require additional inpatient hospital stay due to ongoing treatment in setting of sepsis due to pneumonia, acute anemia  Discharge Plan: Anticipate discharge in 48 hrs to home  Code Status: Level 1 - Full Code    Subjective:   CC: "I feel alright "    Patient resting in the recliner chair, denies complaints of chest pain, shortness of breath, fever, or chills  Patient complains of some right thoracic discomfort that hurts with movement and is hard to get comfortable with  He is aware that he has pain medication on board and to use it as he needs it  Denies complaints of nausea/vomiting  Denies any bright red bleeding in his stools or dark stools  Denies history of hemorrhoids  Reports having history of thalassemia in his family  Eating/drinking okay  Denies iron deficiency in the past      Objective:     Vitals:   Temp (24hrs), Av 9 °F (36 6 °C), Min:97 6 °F (36 4 °C), Max:98 1 °F (36 7 °C)    Temp:  [97 6 °F (36 4 °C)-98 1 °F (36 7 °C)] 98 1 °F (36 7 °C)  HR:  [87-96] 96  Resp:  [18] 18  BP: (106-135)/(52-73) 135/73  SpO2:  [88 %-93 %] 91 %  Body mass index is 29 33 kg/m²  Input and Output Summary (last 24 hours): Intake/Output Summary (Last 24 hours) at 2021 1015  Last data filed at 2021 0630  Gross per 24 hour   Intake 871 67 ml   Output 325 ml   Net 546 67 ml       Physical Exam:   Physical Exam  Vitals signs and nursing note reviewed  Constitutional:       General: He is not in acute distress  Appearance: He is ill-appearing  Cardiovascular:      Rate and Rhythm: Normal rate  Pulses: Normal pulses  Heart sounds: Normal heart sounds  Pulmonary:      Effort: Pulmonary effort is normal       Breath sounds: Decreased breath sounds present  Comments: + dry hacking cough  Abdominal:      General: Bowel sounds are normal  There is no distension  Palpations: Abdomen is soft  Musculoskeletal: Normal range of motion  Skin:     General: Skin is warm and dry  Capillary Refill: Capillary refill takes less than 2 seconds  Neurological:      Mental Status: He is alert and oriented to person, place, and time     Psychiatric:         Mood and Affect: Mood normal          Additional Data:     Labs:  Results from last 7 days   Lab Units 21  0643  21  0959   WBC Thousand/uL 3 71*  --  3 96*   HEMOGLOBIN g/dL 7 1*   < > 6 6*   HEMATOCRIT % 22 1*   < > 21 1*   PLATELETS Thousands/uL 57*  --  60*   NEUTROS PCT %  --   --  74   LYMPHS PCT %  --   --  13*   MONOS PCT %  --   --  9   EOS PCT %  --   --  3    < > = values in this interval not displayed  Results from last 7 days   Lab Units 05/31/21  0643   SODIUM mmol/L 136   POTASSIUM mmol/L 3 5   CHLORIDE mmol/L 103   CO2 mmol/L 23   BUN mg/dL 16   CREATININE mg/dL 1 20   ANION GAP mmol/L 10   CALCIUM mg/dL 7 8*   ALBUMIN g/dL 1 7*   TOTAL BILIRUBIN mg/dL 1 23*   ALK PHOS U/L 189*   ALT U/L 22   AST U/L 17   GLUCOSE RANDOM mg/dL 157*     Results from last 7 days   Lab Units 05/29/21  1044   INR  1 31*     Results from last 7 days   Lab Units 05/31/21  0757 05/30/21  2045 05/30/21  1537 05/30/21  1117 05/30/21  0754 05/29/21  2142 05/29/21  1556   POC GLUCOSE mg/dl 175* 185* 249* 217* 161* 156* 136         Results from last 7 days   Lab Units 05/30/21  0653 05/29/21  1044   LACTIC ACID mmol/L  --  2 0   PROCALCITONIN ng/ml 0 44* 0 62*       Lines/Drains:  Invasive Devices     Peripheral Intravenous Line            Peripheral IV 05/29/21 Right Arm 2 days                Imaging: No pertinent imaging reviewed  Recent Cultures (last 7 days):   Results from last 7 days   Lab Units 05/29/21  1559 05/29/21  1118 05/29/21  1044   BLOOD CULTURE   --  No Growth at 24 hrs  No Growth at 24 hrs     LEGIONELLA URINARY ANTIGEN  Negative  --   --        Last 24 Hours Medication List:   Current Facility-Administered Medications   Medication Dose Route Frequency Provider Last Rate    acetaminophen  650 mg Oral Q6H PRN Michelle Mcmullen MD      acyclovir  400 mg Oral Daily Edgar Larry MD      albuterol  2 5 mg Nebulization Q4H PRN Michelle Mcmullen MD      ALPRAZolam  1 mg Oral BID PRN Michelle Mcmullen MD      aspirin  81 mg Oral Daily Edgar Larry MD      benzonatate  200 mg Oral TID PRN Michelle Mcmullen MD      buPROPion  300 mg Oral Daily Michelle Mcmullen MD      DULoxetine  30 mg Oral Daily Michelle Mcmullen MD      gabapentin  100 mg Oral BID Michelle Mcmullen MD      HYDROmorphone  0 5 mg Intravenous Q4H PRN KEKE Nunn      insulin lispro  1-6 Units Subcutaneous TID AC Michelle Mcmullen MD      ketorolac  15 mg Intravenous Once KEKE Anaya      lenalidomide  5 mg Oral Daily Edgar Larry MD      losartan  25 mg Oral Daily Edgar Larry MD      metoprolol tartrate  25 mg Oral Q12H [facility] Kristen Call MD      oxyCODONE  2 5 mg Oral Q4H PRN KEKE Anaya      oxyCODONE  5 mg Oral Q4H PRN KEKE Anaya      pantoprazole  40 mg Oral Early Morning dEgar Larry MD      sitaGLIPtin  50 mg Oral Daily Kristen Call MD      tamsulosin  0 8 mg Oral Daily With David Smith MD      traZODone  50 mg Oral Daily Kristen Call MD          Today, Patient Was Seen By: KEKE Anaya    **Please Note: This note may have been constructed using a voice recognition system  **

## 2021-05-31 NOTE — ASSESSMENT & PLAN NOTE
· Patient presented to the ED with complaints of shortness of breath and dry cough  · CTA Chest CT Abd/Pelvis (5/29/21): 1  Alveolar consolidation in both lungs in a dependent fashion as described above most consistent with multifocal pneumonia  Small right pleural effusion  2   Suboptimal opacification of the pulmonary artery as described above with no evidence for large central pulmonary emboli  3   Hepatosplenomegaly  4   Additional findings as noted  · Patient meeting sepsis criteria on admission as evidenced by tachycardia, tachypnea  · Blood cultures were obtained x 2 in the emergency department  · Patient received vancomycin and cefepime in the emergency department  · Continue Rocephin and azithromycin  · Urine Legionella/Strep Pneumoniae, negative  · Sputum culture ordered; needs to be obtained  · MRSA Pending  · Initial Procalcitonin 0 62, repeat 0 44 yesterday  Patient afebrile  · Blood cultures negative x 24 hrs  · Patient requiring supplemental oxygen to maintain oxygen saturation levels in the mid 90s; wean as able to maintain oxygen saturation levels 92% or better

## 2021-05-31 NOTE — ASSESSMENT & PLAN NOTE
· With history of PCI  · Continue BB / aspirin  · Xarelto stopped given acute anemia; patient has history of seeing Dr Stuart Solis, however, unable to find any recent office visit notes

## 2021-06-01 ENCOUNTER — APPOINTMENT (INPATIENT)
Dept: RADIOLOGY | Facility: HOSPITAL | Age: 66
DRG: 871 | End: 2021-06-01
Payer: MEDICARE

## 2021-06-01 LAB
ANION GAP SERPL CALCULATED.3IONS-SCNC: 7 MMOL/L (ref 4–13)
ATRIAL RATE: 113 BPM
BASOPHILS # BLD AUTO: 0.01 THOUSANDS/ΜL (ref 0–0.1)
BASOPHILS NFR BLD AUTO: 0 % (ref 0–1)
BUN SERPL-MCNC: 17 MG/DL (ref 5–25)
CALCIUM SERPL-MCNC: 8 MG/DL (ref 8.3–10.1)
CHLORIDE SERPL-SCNC: 103 MMOL/L (ref 100–108)
CO2 SERPL-SCNC: 25 MMOL/L (ref 21–32)
CREAT SERPL-MCNC: 1.18 MG/DL (ref 0.6–1.3)
EOSINOPHIL # BLD AUTO: 0.12 THOUSAND/ΜL (ref 0–0.61)
EOSINOPHIL NFR BLD AUTO: 3 % (ref 0–6)
ERYTHROCYTE [DISTWIDTH] IN BLOOD BY AUTOMATED COUNT: 19.4 % (ref 11.6–15.1)
GFR SERPL CREATININE-BSD FRML MDRD: 65 ML/MIN/1.73SQ M
GLUCOSE SERPL-MCNC: 174 MG/DL (ref 65–140)
GLUCOSE SERPL-MCNC: 176 MG/DL (ref 65–140)
GLUCOSE SERPL-MCNC: 202 MG/DL (ref 65–140)
GLUCOSE SERPL-MCNC: 204 MG/DL (ref 65–140)
GLUCOSE SERPL-MCNC: 291 MG/DL (ref 65–140)
HAPTOGLOB SERPL-MCNC: 409 MG/DL (ref 32–363)
HCT VFR BLD AUTO: 23.1 % (ref 36.5–49.3)
HGB BLD-MCNC: 7.4 G/DL (ref 12–17)
IMM GRANULOCYTES # BLD AUTO: 0.02 THOUSAND/UL (ref 0–0.2)
IMM GRANULOCYTES NFR BLD AUTO: 1 % (ref 0–2)
LYMPHOCYTES # BLD AUTO: 0.57 THOUSANDS/ΜL (ref 0.6–4.47)
LYMPHOCYTES NFR BLD AUTO: 16 % (ref 14–44)
MCH RBC QN AUTO: 23.4 PG (ref 26.8–34.3)
MCHC RBC AUTO-ENTMCNC: 32 G/DL (ref 31.4–37.4)
MCV RBC AUTO: 73 FL (ref 82–98)
MONOCYTES # BLD AUTO: 0.31 THOUSAND/ΜL (ref 0.17–1.22)
MONOCYTES NFR BLD AUTO: 9 % (ref 4–12)
NEUTROPHILS # BLD AUTO: 2.62 THOUSANDS/ΜL (ref 1.85–7.62)
NEUTS SEG NFR BLD AUTO: 71 % (ref 43–75)
NRBC BLD AUTO-RTO: 0 /100 WBCS
P AXIS: 60 DEGREES
PLATELET # BLD AUTO: 60 THOUSANDS/UL (ref 149–390)
POTASSIUM SERPL-SCNC: 3.6 MMOL/L (ref 3.5–5.3)
PR INTERVAL: 142 MS
QRS AXIS: 51 DEGREES
QRSD INTERVAL: 102 MS
QT INTERVAL: 374 MS
QTC INTERVAL: 513 MS
RBC # BLD AUTO: 3.16 MILLION/UL (ref 3.88–5.62)
SODIUM SERPL-SCNC: 135 MMOL/L (ref 136–145)
T WAVE AXIS: 59 DEGREES
VENTRICULAR RATE: 113 BPM
WBC # BLD AUTO: 3.65 THOUSAND/UL (ref 4.31–10.16)

## 2021-06-01 PROCEDURE — 94760 N-INVAS EAR/PLS OXIMETRY 1: CPT

## 2021-06-01 PROCEDURE — 97163 PT EVAL HIGH COMPLEX 45 MIN: CPT

## 2021-06-01 PROCEDURE — 94664 DEMO&/EVAL PT USE INHALER: CPT

## 2021-06-01 PROCEDURE — 93010 ELECTROCARDIOGRAM REPORT: CPT | Performed by: INTERNAL MEDICINE

## 2021-06-01 PROCEDURE — 82948 REAGENT STRIP/BLOOD GLUCOSE: CPT

## 2021-06-01 PROCEDURE — 99223 1ST HOSP IP/OBS HIGH 75: CPT | Performed by: INTERNAL MEDICINE

## 2021-06-01 PROCEDURE — 99232 SBSQ HOSP IP/OBS MODERATE 35: CPT | Performed by: PHYSICIAN ASSISTANT

## 2021-06-01 PROCEDURE — 71045 X-RAY EXAM CHEST 1 VIEW: CPT

## 2021-06-01 PROCEDURE — 85025 COMPLETE CBC W/AUTO DIFF WBC: CPT | Performed by: NURSE PRACTITIONER

## 2021-06-01 PROCEDURE — 80048 BASIC METABOLIC PNL TOTAL CA: CPT | Performed by: NURSE PRACTITIONER

## 2021-06-01 RX ORDER — GUAIFENESIN 600 MG
600 TABLET, EXTENDED RELEASE 12 HR ORAL EVERY 12 HOURS SCHEDULED
Status: DISCONTINUED | OUTPATIENT
Start: 2021-06-01 | End: 2021-06-02 | Stop reason: HOSPADM

## 2021-06-01 RX ADMIN — METOPROLOL TARTRATE 25 MG: 25 TABLET, FILM COATED ORAL at 09:42

## 2021-06-01 RX ADMIN — LOSARTAN POTASSIUM 25 MG: 25 TABLET, FILM COATED ORAL at 09:41

## 2021-06-01 RX ADMIN — ACYCLOVIR 400 MG: 800 TABLET ORAL at 09:43

## 2021-06-01 RX ADMIN — PANTOPRAZOLE SODIUM 40 MG: 40 TABLET, DELAYED RELEASE ORAL at 05:06

## 2021-06-01 RX ADMIN — GUAIFENESIN 600 MG: 600 TABLET ORAL at 20:54

## 2021-06-01 RX ADMIN — ASPIRIN 81 MG: 81 TABLET, COATED ORAL at 09:41

## 2021-06-01 RX ADMIN — TAMSULOSIN HYDROCHLORIDE 0.8 MG: 0.4 CAPSULE ORAL at 17:30

## 2021-06-01 RX ADMIN — GABAPENTIN 100 MG: 100 CAPSULE ORAL at 09:42

## 2021-06-01 RX ADMIN — INSULIN LISPRO 4 UNITS: 100 INJECTION, SOLUTION INTRAVENOUS; SUBCUTANEOUS at 17:00

## 2021-06-01 RX ADMIN — DULOXETINE HYDROCHLORIDE 30 MG: 30 CAPSULE, DELAYED RELEASE ORAL at 09:40

## 2021-06-01 RX ADMIN — GUAIFENESIN 600 MG: 600 TABLET ORAL at 09:48

## 2021-06-01 RX ADMIN — GABAPENTIN 100 MG: 100 CAPSULE ORAL at 18:04

## 2021-06-01 RX ADMIN — BUPROPION 300 MG: 150 TABLET, EXTENDED RELEASE ORAL at 09:40

## 2021-06-01 RX ADMIN — SITAGLIPTIN 50 MG: 50 TABLET, FILM COATED ORAL at 09:42

## 2021-06-01 RX ADMIN — METOPROLOL TARTRATE 25 MG: 25 TABLET, FILM COATED ORAL at 20:54

## 2021-06-01 RX ADMIN — INSULIN LISPRO 2 UNITS: 100 INJECTION, SOLUTION INTRAVENOUS; SUBCUTANEOUS at 11:53

## 2021-06-01 RX ADMIN — INSULIN LISPRO 1 UNITS: 100 INJECTION, SOLUTION INTRAVENOUS; SUBCUTANEOUS at 08:12

## 2021-06-01 NOTE — PLAN OF CARE
Problem: Potential for Falls  Goal: Patient will remain free of falls  Description: INTERVENTIONS:  - Assess patient frequently for physical needs  -  Identify cognitive and physical deficits and behaviors that affect risk of falls    -  [place] fall precautions as indicated by assessment   - Educate patient/family on patient safety including physical limitations  - Instruct patient to call for assistance with activity based on assessment  - Modify environment to reduce risk of injury  - Consider OT/PT consult to assist with strengthening/mobility  Outcome: Progressing     Problem: PAIN - ADULT  Goal: Verbalizes/displays adequate comfort level or baseline comfort level  Description: Interventions:  - Encourage patient to monitor pain and request assistance  - Assess pain using appropriate pain scale  - Administer analgesics based on type and severity of pain and evaluate response  - Implement non-pharmacological measures as appropriate and evaluate response  - Consider cultural and social influences on pain and pain management  - Notify physician/advanced practitioner if interventions unsuccessful or patient reports new pain  Outcome: Progressing     Problem: INFECTION - ADULT  Goal: Absence or prevention of progression during hospitalization  Description: INTERVENTIONS:  - Assess and monitor for signs and symptoms of infection  - Monitor lab/diagnostic results  - Monitor all insertion sites, i e  indwelling lines, tubes, and drains  - Monitor endotracheal if appropriate and nasal secretions for changes in amount and color  - [place] appropriate cooling/warming therapies per order  - Administer medications as ordered  - Instruct and encourage patient and family to use good hand hygiene technique  - Identify and instruct in appropriate isolation precautions for identified infection/condition  Outcome: Progressing     Problem: SAFETY ADULT  Goal: Patient will remain free of falls  Description: INTERVENTIONS:  - Assess patient frequently for physical needs  -  Identify cognitive and physical deficits and behaviors that affect risk of falls    -  [place] fall precautions as indicated by assessment   - Educate patient/family on patient safety including physical limitations  - Instruct patient to call for assistance with activity based on assessment  - Modify environment to reduce risk of injury  - Consider OT/PT consult to assist with strengthening/mobility  Outcome: Progressing  Goal: Maintain or return to baseline ADL function  Description: INTERVENTIONS:  -  Assess patient's ability to carry out ADLs; assess patient's baseline for ADL function and identify physical deficits which impact ability to perform ADLs (bathing, care of mouth/teeth, toileting, grooming, dressing, etc )  - Assess/evaluate cause of self-care deficits   - Assess range of motion  - Assess patient's mobility; develop plan if impaired  - Assess patient's need for assistive devices and provide as appropriate  - Encourage maximum independence but intervene and supervise when necessary  - Involve family in performance of ADLs  - Assess for home care needs following discharge   - Consider OT consult to assist with ADL evaluation and planning for discharge  - Provide patient education as appropriate  Outcome: Progressing  Goal: Maintain or return mobility status to optimal level  Description: INTERVENTIONS:  - Assess patient's baseline mobility status (ambulation, transfers, stairs, etc )    - Identify cognitive and physical deficits and behaviors that affect mobility  - Identify mobility aids required to assist with transfers and/or ambulation (gait belt, sit-to-stand, lift, walker, cane, etc )  - [place] fall precautions as indicated by assessment  - Record patient progress and toleration of activity level on Mobility SBAR; progress patient to next Phase/Stage  - Instruct patient to call for assistance with activity based on assessment  - Consider rehabilitation consult to assist with strengthening/weightbearing, etc   Outcome: Progressing     Problem: DISCHARGE PLANNING  Goal: Discharge to home or other facility with appropriate resources  Description: INTERVENTIONS:  - Identify barriers to discharge w/patient and caregiver  - Arrange for needed discharge resources and transportation as appropriate  - Identify discharge learning needs (meds, wound care, etc )  - Arrange for interpretive services to assist at discharge as needed  - Refer to Case Management Department for coordinating discharge planning if the patient needs post-hospital services based on physician/advanced practitioner order or complex needs related to functional status, cognitive ability, or social support system  Outcome: Progressing     Problem: Knowledge Deficit  Goal: Patient/family/caregiver demonstrates understanding of disease process, treatment plan, medications, and discharge instructions  Description: Complete learning assessment and assess knowledge base  Interventions:  - Provide teaching at level of understanding  - Provide teaching via preferred learning methods  Outcome: Progressing     Problem: Nutrition/Hydration-ADULT  Goal: Nutrient/Hydration intake appropriate for improving, restoring or maintaining nutritional needs  Description: Monitor and assess patient's nutrition/hydration status for malnutrition  Collaborate with interdisciplinary team and initiate plan and interventions as ordered  Monitor patient's weight and dietary intake as ordered or per policy  Utilize nutrition screening tool and intervene as necessary  Determine patient's food preferences and provide high-protein, high-caloric foods as appropriate       INTERVENTIONS:  - Monitor oral intake, urinary output, labs, and treatment plans  - Assess nutrition and hydration status and recommend course of action  - Evaluate amount of meals eaten  - Assist patient with eating if necessary   - Allow adequate time for meals  - Recommend/ encourage appropriate diets, oral nutritional supplements, and vitamin/mineral supplements  - Order, calculate, and assess calorie counts as needed  - Recommend, monitor, and adjust tube feedings and TPN/PPN based on assessed needs  - Assess need for intravenous fluids  - Provide specific nutrition/hydration education as appropriate  - Include patient/family/caregiver in decisions related to nutrition  Outcome: Progressing

## 2021-06-01 NOTE — PHYSICAL THERAPY NOTE
Physical Therapy Evaluation     Patient's Name: Ralph Du    Admitting Diagnosis  SOB (shortness of breath) [R06 02]  Sepsis ([facility] ) [A41 9]  Multifocal pneumonia [J18 9]    Problem List  Patient Active Problem List   Diagnosis    ED (erectile dysfunction)    Screening for colon cancer    Essential hypertension    Multiple myeloma ([facility] )    Recurrent major depressive disorder, in full remission ([place] [address] )    Type 2 diabetes mellitus with kidney complication, without long-term current use of insulin ([place] [address] )    Neuropathy    Hyperlipidemia    Oropharyngeal dysphagia    Urinary retention due to benign prostatic hyperplasia    Encounter to discuss test results    Thrombocytopenia ([place] [address] )    Other ejaculatory dysfunction    [facility] to [facility]  )    Acute respiratory failure with hypoxia ([place] [address] )    Stage 3a chronic kidney disease ([place] [address] )    CAD (coronary artery disease)    Microcytic anemia    Serum total bilirubin elevated       Past Medical History  Past Medical History:   Diagnosis Date    Back problem     Diabetes ([place] [address] )     History of depression     Hyperlipidemia     Hypertension     Multiple myeloma ([place] [address] )     Myocardial infarction [facility])        Past Surgical History  Past Surgical History:   Procedure Laterality Date    CARDIAC CATHETERIZATION      HERNIA REPAIR      PA COLONOSCOPY FLX DX W/COLLJ SPEC WHEN PFRMD N/A 11/16/2018    Procedure: COLONOSCOPY;  Surgeon: Chaparrita Matias MD;  Location: [facility];   Service: Gastroenterology    TONSILLECTOMY      TONSILLECTOMY            06/01/21 1030   PT Last Visit   PT Visit Date 06/01/21   Note Type   Note type Evaluation   Pain Assessment   Pain Assessment Tool Pain Assessment not indicated - pt denies pain   Pain Score No Pain  ("only on my side when I cough")   Home Living   Type of [address] [place] [address] Two level;Bed/bath upstairs;Stairs to enter with rails;1/2 bath on main level  (4 IAN)   Home Equipment   (no AD used at baseline) Prior Function   Level of [place] Independent with ADLs and functional mobility   Lives With Spouse   Receives Help From Family   ADL Assistance Independent   IADLs Needs assistance   Falls in the last 6 months 0   Vocational Retired   Comments (+) driving   Restrictions/Precautions   Wells Gustavo Bearing Precautions Per Order No   Other Precautions O2;Fall Risk  (3L O2 NC)   General   Family/Caregiver Present No   Cognition   Overall Cognitive Status WFL   Arousal/Participation Alert   Orientation Level Oriented X4   Memory Within functional limits   Following Commands Follows all commands and directions without difficulty   Comments pt agreeable to PT evaluation   RLE Assessment   RLE Assessment WFL  (assessed c functional mobility)   LLE Assessment   LLE Assessment WFL  (assessed c functional mobility)   Coordination   Movements are Fluid and Coordinated 1   Sensation WFL   Bed Mobility   Additional Comments pt received OOB to recliner upon arrival   Transfers   Sit to Stand 5  Supervision   Additional items Assist x 1; Increased time required   Stand to Sit 5  Supervision   Additional items Assist x 1; Increased time required   Additional Comments SpO2 during gait trial : 93%, de saturated to 90% on 3L O2 NC 2 min following mobility trial  Discussed with ASHLEY Mullins   Ambulation/Elevation   Gait pattern Inconsistent demetrius; Short stride   Gait Assistance 5  Supervision   Additional items Assist x 1   Assistive Device None   Distance 40'   Balance   Static Sitting Good   Dynamic Sitting Good   Static Standing Fair +   Dynamic Standing Fair +   Ambulatory Fair +   Endurance Deficit   Endurance Deficit Yes   Activity Tolerance   Activity Tolerance Patient tolerated treatment well   Nurse Made Aware ASHLEY Cummings verbalized pt appropriate to see, made aware of session outcome/recs   Assessment   Prognosis Good   Problem List Decreased strength;Decreased endurance; Impaired balance;Decreased mobility   Assessment Pt is 65 y o  male seen for PT evaluation on 6/1/2021 s/p admit to [facility] on 5/29/2021 w/ Sepsis due to pneumonia [facility])  PT consulted to assess pt's functional mobility and d/c needs  Order placed for PT eval and tx  Performed at least 2 patient identifiers during session: Name and wristband  Comorbidities affecting pt's physical performance at time of assessment include:  has a past medical history of Back problem, Diabetes ([facility] ), History of depression, Hyperlipidemia, Hypertension, [facility] ([facility] ), and Myocardial infarction ([facility] )  PTA, pt was independent w/ all functional mobility w/ no AD/DME, ambulates unrestricted distances and all terrain, has 4 IAN, lives w/ spouse in 2 level home and retired  Personal factors affecting pt at time of IE include: inaccessible home environment, stairs to enter home and inability to navigate community distances  Please find objective findings from PT assessment regarding body systems outlined above with impairments and limitations including weakness, impaired balance, decreased endurance, gait deviations, decreased activity tolerance and fall risk, as well as mobility assessment (need for cueing for mobility technique)  The following objective measures performed on IE also reveal limitations: Barthel Index: 70/100 and Modified [place]: 2 (slight disability)  Pt's clinical presentation is currently unstable/unpredictable seen in pt's presentation of abnormal lab value(s), need for input for task focus and mobility technique and ongoing medical assessment  Pt to benefit from continued PT tx to address deficits as defined above and maximize level of functional independent mobility and consistency  From PT/mobility standpoint, recommendation at time of d/c would be no rehabilitation needs pending progress in order to facilitate return to PLOF     Barriers to Discharge None   Goals   Patient Goals to return home   STG Expiration Date 06/11/21   Short Term Goal #1 In 7-10 days: Increase bilateral LE strength 1/2 grade to facilitate independent mobility, Perform all bed mobility tasks modified independent to decrease caregiver burden, Perform all transfers modified independent to improve independence, Ambulate > 150 ft  with least restrictive assistive device modified independent w/o LOB and w/ normalized gait pattern 100% of the time, Navigate 13 stairs modified independent with unilateral handrail to facilitate return to previous living environment, Increase all balance 1/2 grade to decrease risk for falls, Improve Barthel Index score to 85 or greater to facilitate independence and PT provider will perform functional balance assessment to determine fall risk   PT Treatment Day 0   Plan   Treatment/Interventions Functional transfer training;LE strengthening/ROM; Elevations; Therapeutic exercise; Endurance training;Gait training;Spoke to nursing   PT Frequency 2-3x/wk   Recommendation   PT Discharge Recommendation No rehabilitation needs  (anticipated pending progress here)   Equipment Recommended   (none anticipated)   AM-PAC Basic Mobility Inpatient   Turning in Bed Without Bedrails 4   Lying on Back to Sitting on Edge of Flat Bed 4   Moving Bed to Chair 3   Standing Up From Chair 3   Walk in Room 3   Climb 3-5 Stairs 3   Basic Mobility Inpatient Raw Score 20   Basic Mobility Standardized Score 43 99    The patient's AM-PAC Basic Mobility Inpatient Short Form Raw Score is 20, Standardized Score is 43 99  A standardized score greater than 42 9 suggests the patient may benefit from discharge to home  Please also refer to the recommendation of the Physical Therapist for safe discharge planning       Modified Hughes Scale   Modified [place] Scale 2   Barthel Index   Feeding 10   Bathing 5   Grooming Score 5   Dressing Score 10   Bladder Score 10   Bowels Score 10   Toilet Use Score 10   Transfers (Bed/Chair) Score 10   Mobility (Level Surface) Score 0   Stairs Score 0   Barthel Index Score 70 Juan David Lou, PT, DPT

## 2021-06-01 NOTE — ASSESSMENT & PLAN NOTE
· Although this is chronic, baseline hemoglobin appears to be around 9 to 10,  · Hemoglobin on admission noted to be 7 9  · Patient with acute drop in hemoglobin to 6 6 and is now s/p 1 U PRBC with repeat hemoglobin of 7 4, stable  Patient ultimately asymptomatic  · Iron panel completed; also noted to be iron deficient  · GI consult placed; so far, patient without signs of dark stools  · Xarelto stopped  Patient currently taking 2 5 mg Xarelto for CAD indication  · Haptoglobin pending, direct bili 0 91, Hemolysis smear showing helmet cells    · Hematology consult pending, appreciate input

## 2021-06-01 NOTE — ASSESSMENT & PLAN NOTE
· With history of PCI, no chest pain on admit  · Continue BB / aspirin  · Xarelto stopped given acute anemia; patient has history of seeing Dr Constanza Vance, however, unable to find any recent office visit notes

## 2021-06-01 NOTE — PLAN OF CARE
Problem: PHYSICAL THERAPY ADULT  Goal: Performs mobility at highest level of function for planned discharge setting  See evaluation for individualized goals  Description: Treatment/Interventions: Functional transfer training, LE strengthening/ROM, Elevations, Therapeutic exercise, Endurance training, Gait training, Spoke to nursing  Equipment Recommended: (none anticipated)       See flowsheet documentation for full assessment, interventions and recommendations  Note: Prognosis: Good  Problem List: Decreased strength, Decreased endurance, Impaired balance, Decreased mobility  Assessment: Pt is 63 y o  male seen for PT evaluation on 6/1/2021 s/p admit to [facility] on 5/29/2021 w/ Sepsis due to pneumonia [facility])  PT consulted to assess pt's functional mobility and d/c needs  Order placed for PT eval and tx  Performed at least 2 patient identifiers during session: Name and wristband  Comorbidities affecting pt's physical performance at time of assessment include:  has a past medical history of Back problem, Diabetes ([facility] ), History of depression, Hyperlipidemia, Hypertension, Multiple myeloma ([facility] ), and Myocardial infarction ([facility] )  PTA, pt was independent w/ all functional mobility w/ no AD/DME, ambulates unrestricted distances and all terrain, has 4 IAN, lives w/ spouse in 2 level home and retired  Personal factors affecting pt at time of IE include: inaccessible home environment, stairs to enter home and inability to navigate community distances  Please find objective findings from PT assessment regarding body systems outlined above with impairments and limitations including weakness, impaired balance, decreased endurance, gait deviations, decreased activity tolerance and fall risk, as well as mobility assessment (need for cueing for mobility technique)  The following objective measures performed on IE also reveal limitations: Barthel Index: 70/100 and Modified [place]: 2 (slight disability)   Pt's clinical presentation is currently unstable/unpredictable seen in pt's presentation of abnormal lab value(s), need for input for task focus and mobility technique and ongoing medical assessment  Pt to benefit from continued PT tx to address deficits as defined above and maximize level of functional independent mobility and consistency  From PT/mobility standpoint, recommendation at time of d/c would be no rehabilitation needs pending progress in order to facilitate return to PLOF  Barriers to Discharge: None        PT Discharge Recommendation: No rehabilitation needs(anticipated pending progress here)          See flowsheet documentation for full assessment

## 2021-06-01 NOTE — ASSESSMENT & PLAN NOTE
· Continue Revlimid and outpatient follow-up with Hematology (follows at [facility])  · Patient is on his 3rd line of MM treatment x 1 year

## 2021-06-01 NOTE — CONSULTS
Medical Oncology/Hematology Consult Note  Joya Johnson, male, 63 y o , 1955,  /-01, [id number]     Reason for admission: sepsis due to pneumonia  Reason for consultation: Multiple myeloma, in remission; microcytic anemia      Assessment and Plan  1  Microcytic Anemia   Baseline Hgb between 9-11 in setting of multiple myeloma    Patient has a family history of thalassemia but was never tested himself and does not wish to pursue workup at this time   Iron Panel 5/30: saturation 10, TIBC 204, ferritin 549  Will hold on any iron supplementation for now until infection clears   Occult blood negative, no evidence of melena, hematochezia, hematuria   Per GI, will plan for upper endoscopy/colonoscopy later this admission or as outpatient    Patient would like to follow up with Melissa Garber for this   Continue to transfuse as necessary     2  Thrombocytopenia   Patient's baseline is between high 50s - low 70s since 2019  Patient states his platelets have been low since autologous stem cell transplant in 2016   Today plt is 60,000   Haptoglobin pending, LDH WNL and bilirubin consistently elevated since 3/24/2020 and is now trending down  Helmet cells noted on smear from 5/30  No evidence of petechia or active bleed  Suspicion for microangiopathic hemolysis low at this time   Hepatosplenomegaly noted on CT, HIV and hepatitis panels pending    Will continue to monitor     3  IgA Kappa Multiple Myeloma  · Receiving monthly infusions of elotuzumab with Revlimid 5mg days 21-28 per notes from Melissa Garber   · Currently not taking Revlimid in preparation for a "prostate procedure" he had scheduled later this month and will resume therapy per recommendation from [facility]      Patient understands and is in agreement with this plan  History of present illness: Patient is a 64 yo male PMH CAD, type 2 DM, multple myeloma, stage 3A CKD presented to ED with SOB and cough x 3-4 days  Currently admitted for sepsis due to pneumonia with sputum culture preliminary results showing 2+ gram neg rods and 2+ gram pos cocci  Recently treated for UTI  Interval History:  The patient is feeling okay today  He is having productive cough and is more fatigued today  Patient concern for blood loss with acute drop in hemoglobin and patient is willing to follow up with GI for upper endoscopy/colonoscopy  Last colonoscopy was in 2018 only significant for pandiverticulosis  Admits to some shortness of breath which has improved on oxygen  Mild cough  Feeling more fatigued today  Denies headaches, vision changes, bone pain, joint pain, N/V/D, hematuria, hematochezia, melena  Review of Systems:   Review of Systems   Constitutional: Positive for fatigue  Negative for appetite change, chills, fever and unexpected weight change  HENT: Negative for ear pain and sore throat  Eyes: Negative for pain and visual disturbance  Respiratory: Positive for cough  Negative for shortness of breath  Cardiovascular: Negative for chest pain and palpitations  Gastrointestinal: Negative for abdominal pain, blood in stool, diarrhea, nausea and vomiting  Genitourinary: Negative for dysuria  Musculoskeletal: Negative for arthralgias and back pain  Skin: Negative for color change and rash  Neurological: Negative for seizures and syncope  Hematological: Negative for adenopathy  Does not bruise/bleed easily  All other systems reviewed and are negative        Past Medical History:   Diagnosis Date    Back problem     Diabetes ([facility] [address] )     History of depression     Hyperlipidemia     Hypertension     Multiple myeloma ([facility] )     Myocardial infarction [facility])        Past Surgical History:   Procedure Laterality Date    CARDIAC CATHETERIZATION      HERNIA REPAIR      AZ COLONOSCOPY FLX DX W/COLLJ SPEC WHEN PFRMD N/A 11/16/2018    Procedure: COLONOSCOPY;  Surgeon: Khai Cardoso MD;  Location: [facility];  Service: Gastroenterology    TONSILLECTOMY      TONSILLECTOMY         Family History   Problem Relation Age of Onset    Heart disease Father     Diabetes Brother        Social History     Socioeconomic History    Marital status: /Civil Union     Spouse name: None    Number of children: None    Years of education: None    Highest education level: None   Occupational History    None   Social Needs    Financial resource strain: None    Food insecurity     Worry: None     Inability: None    Transportation needs     Medical: None     Non-medical: None   Tobacco Use    Smoking status: Never Smoker    Smokeless tobacco: Never Used   Substance and Sexual Activity    Alcohol use: Yes     Frequency: Monthly or less     Drinks per session: 1 or 2     Binge frequency: Never     Comment: socially     Drug use: No    Sexual activity: Yes   Lifestyle    Physical activity     Days per week: 0 days     Minutes per session: 0 min    Stress: Not at all   Relationships    Social connections     Talks on phone: None     Gets together: None     Attends [employer] service: None     Active member of club or organization: None     Attends meetings of clubs or organizations: None     Relationship status: None    Intimate partner violence     Fear of current or ex partner: None     Emotionally abused: None     Physically abused: None     Forced sexual activity: None   Other Topics Concern    None   Social History Narrative    None         Current Facility-Administered Medications:     acetaminophen (TYLENOL) tablet 650 mg, 650 mg, Oral, Q6H PRN, Naveen Valdivia MD    acyclovir (ZOVIRAX) tablet 400 mg, 400 mg, Oral, Daily, Edgar Larry MD, 400 mg at 05/31/21 0804    albuterol inhalation solution 2 5 mg, 2 5 mg, Nebulization, Q4H PRN, Edgar Larry MD, 2 5 mg at 05/30/21 1240    ALPRAZolam (XANAX) tablet 1 mg, 1 mg, Oral, BID PRN, Naveen Valdivia MD    aspirin (ECOTRIN LOW STRENGTH) EC tablet 81 mg, 81 mg, Oral, Daily, Elyn Goodpasture, MD, 81 mg at 05/31/21 0803    benzonatate (TESSALON PERLES) capsule 200 mg, 200 mg, Oral, TID PRN, Elyn Goodpasture, MD    buPROPion (WELLBUTRIN XL) 24 hr tablet 300 mg, 300 mg, Oral, Daily, Elyn Goodpasture, MD, 300 mg at 05/31/21 0803    DULoxetine (CYMBALTA) delayed release capsule 30 mg, 30 mg, Oral, Daily, Elyn Goodpasture, MD, 30 mg at 05/31/21 0803    gabapentin (NEURONTIN) capsule 100 mg, 100 mg, Oral, BID, Elyn Goodpasture, MD, 100 mg at 05/31/21 1723    HYDROmorphone (DILAUDID) injection 0 5 mg, 0 5 mg, Intravenous, Q4H PRN, KEKE Bourgeois    insulin lispro (HumaLOG) 100 units/mL subcutaneous injection 1-6 Units, 1-6 Units, Subcutaneous, TID AC, 2 Units at 05/31/21 1722 **AND** Fingerstick Glucose (POCT), , , TID AC, Edgar Larry MD    ketorolac (TORADOL) injection 15 mg, 15 mg, Intravenous, Once, KEKE Bourgeois    lenalidomide (REVLIMID) capsule 5 mg, 5 mg, Oral, Daily, Elyn Goodpasture, MD, Stopped at 05/29/21 1616    losartan (COZAAR) tablet 25 mg, 25 mg, Oral, Daily, Elyn Goodpasture, MD, 25 mg at 05/31/21 0803    metoprolol tartrate (LOPRESSOR) tablet 25 mg, 25 mg, Oral, Q12H [facility], Elyn Goodpasture, MD, 25 mg at 05/31/21 2130    oxyCODONE (ROXICODONE) IR tablet 2 5 mg, 2 5 mg, Oral, Q4H PRN, KEKE Bourgeois    oxyCODONE (ROXICODONE) IR tablet 5 mg, 5 mg, Oral, Q4H PRN, KEKE Bourgeois    pantoprazole (PROTONIX) EC tablet 40 mg, 40 mg, Oral, Early Morning, Elyn Goodpasture, MD, 40 mg at 06/01/21 0506    sitaGLIPtin (JANUVIA) tablet 50 mg, 50 mg, Oral, Daily, Elyn Goodpasture, MD, 50 mg at 05/31/21 0803    tamsulosin (FLOMAX) capsule 0 8 mg, 0 8 mg, Oral, Daily With Jordy Jerez MD, 0 8 mg at 05/31/21 1723    traZODone (DESYREL) tablet 50 mg, 50 mg, Oral, Daily, Elyn Goodpasture, MD, 50 mg at 05/31/21 0803    Medications Prior to Admission   Medication    acyclovir (ZOVIRAX) 400 MG tablet    ALPRAZolam (XANAX) 1 mg tablet    aspirin (ECOTRIN LOW STRENGTH) 81 mg EC tablet    benzonatate (TESSALON) 200 MG capsule    buPROPion (WELLBUTRIN SR) 100 mg 12 hr tablet    cholecalciferol (VITAMIN D3) 1,000 units tablet    colesevelam (WELCHOL) 625 mg tablet    cyanocobalamin (VITAMIN B-12) 1,000 mcg tablet    DULoxetine (CYMBALTA) 30 mg delayed release capsule    DULoxetine (CYMBALTA) 60 mg delayed release capsule    glimepiride (AMARYL) 1 mg tablet    JANUMET XR  MG TB24    lenalidomide (REVLIMID) 5 MG CAPS    losartan (COZAAR) 25 mg tablet    metoprolol tartrate (LOPRESSOR) 25 mg tablet    nitroglycerin (NITROSTAT) 0 4 mg SL tablet    omeprazole (PriLOSEC) 20 mg delayed release capsule    rosuvastatin (CRESTOR) 5 mg tablet    sildenafil (REVATIO) 20 mg tablet    tamsulosin (FLOMAX) 0 4 mg    traZODone (DESYREL) 50 mg tablet    Vascepa 1 g CAPS    Xarelto 2 5 MG tablet    [] cephalexin (KEFLEX) 500 mg capsule    gabapentin (NEURONTIN) 300 mg capsule       Allergies   Allergen Reactions    Iodinated Diagnostic Agents Anaphylaxis    Trilipix [Choline Fenofibrate]      Other reaction(s): jaundice  Other reaction(s): jaundice    Atorvastatin     Shellfish Allergy - Food Allergy          Physical Exam:    /58   Pulse 90   Temp 97 5 °F (36 4 °C)   Resp 20   Ht 5' 9" (1 753 m)   Wt 90 1 kg (198 lb 10 2 oz)   SpO2 91%   BMI 29 33 kg/m²     Physical Exam  Vitals signs and nursing note reviewed  Constitutional:       General: He is not in acute distress  Appearance: Normal appearance  He is not ill-appearing  HENT:      Head: Normocephalic and atraumatic  Eyes:      General: No scleral icterus  Cardiovascular:      Rate and Rhythm: Normal rate and regular rhythm  Pulses: Normal pulses  Heart sounds: Normal heart sounds  No murmur  Pulmonary:      Effort: Pulmonary effort is normal  No respiratory distress  Breath sounds: Normal breath sounds  No wheezing  Chest:      Chest wall: No tenderness     Abdominal: General: Abdomen is flat  Bowel sounds are normal       Palpations: Abdomen is soft  Musculoskeletal:         General: No swelling  Right lower leg: No edema  Left lower leg: No edema  Lymphadenopathy:      Cervical: No cervical adenopathy  Skin:     General: Skin is warm and dry  Findings: No bruising or rash (no petechiae)  Neurological:      Mental Status: He is alert and oriented to person, place, and time           Recent Results (from the past 48 hour(s))   CBC and differential    Collection Time: 05/30/21  9:59 AM   Result Value Ref Range    WBC 3 96 (L) 4 31 - 10 16 Thousand/uL    RBC 2 96 (L) 3 88 - 5 62 Million/uL    Hemoglobin 6 6 (LL) 12 0 - 17 0 g/dL    Hematocrit 21 1 (L) 36 5 - 49 3 %    MCV 71 (L) 82 - 98 fL    MCH 22 3 (L) 26 8 - 34 3 pg    MCHC 31 3 (L) 31 4 - 37 4 g/dL    RDW 18 2 (H) 11 6 - 15 1 %    Platelets 60 (L) 306 - 390 Thousands/uL    nRBC 0 /100 WBCs    Neutrophils Relative 74 43 - 75 %    Immat GRANS % 1 0 - 2 %    Lymphocytes Relative 13 (L) 14 - 44 %    Monocytes Relative 9 4 - 12 %    Eosinophils Relative 3 0 - 6 %    Basophils Relative 0 0 - 1 %    Neutrophils Absolute 2 90 1 85 - 7 62 Thousands/µL    Immature Grans Absolute 0 05 0 00 - 0 20 Thousand/uL    Lymphocytes Absolute 0 53 (L) 0 60 - 4 47 Thousands/µL    Monocytes Absolute 0 35 0 17 - 1 22 Thousand/µL    Eosinophils Absolute 0 12 0 00 - 0 61 Thousand/µL    Basophils Absolute 0 01 0 00 - 0 10 Thousands/µL   Fingerstick Glucose (POCT)    Collection Time: 05/30/21 11:17 AM   Result Value Ref Range    POC Glucose 217 (H) 65 - 140 mg/dl   Type and screen    Collection Time: 05/30/21 12:36 PM   Result Value Ref Range    ABO Grouping O     Rh Factor Positive     Antibody Screen Negative     Specimen Expiration Date [id number]    Hemoglobin and hematocrit, blood    Collection Time: 05/30/21 12:36 PM   Result Value Ref Range    Hemoglobin 6 9 (LL) 12 0 - 17 0 g/dL    Hematocrit 21 3 (L) 36 5 - 49 3 % Fingerstick Glucose (POCT)    Collection Time: 05/30/21  3:37 PM   Result Value Ref Range    POC Glucose 249 (H) 65 - 140 mg/dl   Hemolysis Smear    Collection Time: 05/30/21  5:52 PM   Result Value Ref Range    Hemolysis Smear Helmet Cells noted    Bilirubin, direct    Collection Time: 05/30/21  5:52 PM   Result Value Ref Range    Bilirubin, Direct 0 91 (H) 0 00 - 0 20 mg/dL   Hemoglobin and hematocrit, blood    Collection Time: 05/30/21  5:52 PM   Result Value Ref Range    Hemoglobin 7 6 (L) 12 0 - 17 0 g/dL    Hematocrit 24 1 (L) 36 5 - 49 3 %   Fingerstick Glucose (POCT)    Collection Time: 05/30/21  8:45 PM   Result Value Ref Range    POC Glucose 185 (H) 65 - 140 mg/dl   Hemoglobin and hematocrit, blood    Collection Time: 05/31/21 12:00 AM   Result Value Ref Range    Hemoglobin 7 5 (L) 12 0 - 17 0 g/dL    Hematocrit 23 0 (L) 36 5 - 49 3 %   Prepare Leukoreduced RBC: 1 Units    Collection Time: 05/31/21  4:10 AM   Result Value Ref Range    Unit Product Code B7828X09     Unit Number [id number]     Unit ABO O     Unit [facility] POS     Crossmatch Compatible     Unit Dispense Status Presumed Trans    CBC    Collection Time: 05/31/21  6:43 AM   Result Value Ref Range    WBC 3 71 (L) 4 31 - 10 16 Thousand/uL    RBC 3 02 (L) 3 88 - 5 62 Million/uL    Hemoglobin 7 1 (L) 12 0 - 17 0 g/dL    Hematocrit 22 1 (L) 36 5 - 49 3 %    MCV 73 (L) 82 - 98 fL    MCH 23 5 (L) 26 8 - 34 3 pg    MCHC 32 1 31 4 - 37 4 g/dL    RDW 19 5 (H) 11 6 - 15 1 %    Platelets 57 (L) 445 - 390 Thousands/uL   Comprehensive metabolic panel    Collection Time: 05/31/21  6:43 AM   Result Value Ref Range    Sodium 136 136 - 145 mmol/L    Potassium 3 5 3 5 - 5 3 mmol/L    Chloride 103 100 - 108 mmol/L    CO2 23 21 - 32 mmol/L    ANION GAP 10 4 - 13 mmol/L    BUN 16 5 - 25 mg/dL    Creatinine 1 20 0 60 - 1 30 mg/dL    Glucose 157 (H) 65 - 140 mg/dL    Calcium 7 8 (L) 8 3 - 10 1 mg/dL    Corrected Calcium 9 6 8 3 - 10 1 mg/dL    AST 17 5 - 45 U/L    ALT 22 12 - 78 U/L    Alkaline Phosphatase 189 (H) 46 - 116 U/L    Total Protein 5 6 (L) 6 4 - 8 2 g/dL    Albumin 1 7 (L) 3 5 - 5 0 g/dL    Total Bilirubin 1 23 (H) 0 20 - 1 00 mg/dL    eGFR 64 ml/min/1 73sq m   Fingerstick Glucose (POCT)    Collection Time: 05/31/21  7:57 AM   Result Value Ref Range    POC Glucose 175 (H) 65 - 140 mg/dl   Fingerstick Glucose (POCT)    Collection Time: 05/31/21 10:36 AM   Result Value Ref Range    POC Glucose 237 (H) 65 - 140 mg/dl   Sputum culture and Gram stain    Collection Time: 05/31/21  1:57 PM    Specimen: Expectorated Sputum   Result Value Ref Range    Gram Stain Result 2+ Epithelial cells per low power field (A)     Gram Stain Result 2+ Gram negative rods (A)     Gram Stain Result 2+ Gram positive cocci in pairs (A)     Gram Stain Result No polys seen (A)    Fingerstick Glucose (POCT)    Collection Time: 05/31/21  4:07 PM   Result Value Ref Range    POC Glucose 201 (H) 65 - 140 mg/dl   Occult blood 1-3, stool    Collection Time: 05/31/21  8:25 PM   Result Value Ref Range    Fecal Occult Blood Diagnostic Negative Negative    Fecal Occult Blood Diagnostic 2 Negative Negative    Fecal Occult Blood Diagnostic 3 Negative Negative   Fingerstick Glucose (POCT)    Collection Time: 05/31/21  8:58 PM   Result Value Ref Range    POC Glucose 214 (H) 65 - 140 mg/dl   Basic metabolic panel    Collection Time: 06/01/21  5:07 AM   Result Value Ref Range    Sodium 135 (L) 136 - 145 mmol/L    Potassium 3 6 3 5 - 5 3 mmol/L    Chloride 103 100 - 108 mmol/L    CO2 25 21 - 32 mmol/L    ANION GAP 7 4 - 13 mmol/L    BUN 17 5 - 25 mg/dL    Creatinine 1 18 0 60 - 1 30 mg/dL    Glucose 176 (H) 65 - 140 mg/dL    Calcium 8 0 (L) 8 3 - 10 1 mg/dL    eGFR 65 ml/min/1 73sq m   CBC and differential    Collection Time: 06/01/21  5:07 AM   Result Value Ref Range    WBC 3 65 (L) 4 31 - 10 16 Thousand/uL    RBC 3 16 (L) 3 88 - 5 62 Million/uL    Hemoglobin 7 4 (L) 12 0 - 17 0 g/dL    Hematocrit 23 1 (L) 36 5 - 49 3 %    MCV 73 (L) 82 - 98 fL    MCH 23 4 (L) 26 8 - 34 3 pg    MCHC 32 0 31 4 - 37 4 g/dL    RDW 19 4 (H) 11 6 - 15 1 %    Platelets 60 (L) 995 - 390 Thousands/uL    nRBC 0 /100 WBCs    Neutrophils Relative 71 43 - 75 %    Immat GRANS % 1 0 - 2 %    Lymphocytes Relative 16 14 - 44 %    Monocytes Relative 9 4 - 12 %    Eosinophils Relative 3 0 - 6 %    Basophils Relative 0 0 - 1 %    Neutrophils Absolute 2 62 1 85 - 7 62 Thousands/µL    Immature Grans Absolute 0 02 0 00 - 0 20 Thousand/uL    Lymphocytes Absolute 0 57 (L) 0 60 - 4 47 Thousands/µL    Monocytes Absolute 0 31 0 17 - 1 22 Thousand/µL    Eosinophils Absolute 0 12 0 00 - 0 61 Thousand/µL    Basophils Absolute 0 01 0 00 - 0 10 Thousands/µL       Xr Chest Pa & Lateral    Result Date: 5/23/2021  Narrative: CHEST INDICATION:   N40 0: Benign prostatic hyperplasia without lower urinary tract symptoms  Multiple myeloma  Preop for TURP  Fever  On Keflex for pink-tinged urine  Low-grade fever and fatigue  COMPARISON:  Report from a chest CT from [facility] from 6/30/2017 which describes no acute cardiopulmonary disease  EXAM PERFORMED/VIEWS:  XR CHEST PA & LATERAL  DUAL ENERGY SUBTRACTION  FINDINGS: Cardiomediastinal silhouette normal  Rounded opacity in the right upper lung and patchy consolidation in the left base  No effusion or pneumothorax  Mild degenerative disease in the spine  Impression: Rounded opacity in the right upper lung and patchy consolidation in the left base, compatible with pneumonia  Recommend reevaluation with a chest radiograph in 2 months to assure resolution  The patient has been placed on Keflex for potential UTI  I notified Dr Hailey Godfrey and TSERING GALEANO on 5/23/2021 at 5:55 PM by Epic message  This study was also marked for significant notification and follow-up    Workstation performed: [id number]     Pe Study With Ct Abdomen & Pelvis With Contrast    Result Date: 5/29/2021  Narrative: CT PULMONARY ANGIOGRAM OF THE CHEST AND CT ABDOMEN AND PELVIS WITH INTRAVENOUS CONTRAST INDICATION:   chest pain with sob  Patient has suspected COVID-19  COMPARISON:  Chest radiograph 5/20/2021 TECHNIQUE:  CT examination of the chest, abdomen and pelvis was performed  Thin section CT angiographic technique was used in the chest in order to evaluate for pulmonary embolus and coronal 3D MIP postprocessing was performed on the acquisition scanner  Axial, sagittal, and coronal 2D reformatted images were created from the source data and submitted for interpretation  Radiation dose length product (DLP) for this visit:  1106 mGy-cm   This examination, like all CT scans performed in the [facility], was performed utilizing techniques to minimize radiation dose exposure, including the use of iterative reconstruction and automated exposure control  IV Contrast:  100 mL of iohexol (OMNIPAQUE) Enteric Contrast:  Enteric contrast was not administered  FINDINGS: CHEST PULMONARY ARTERIAL TREE:  Suboptimal opacification of the pulmonary arterial vasculature is noted with limited sensitivity for detection of pulmonary emboli in the segmental and smaller branches  No central pulmonary emboli are identified  Pulmonary artery is of normal caliber  LUNGS:  Consolidation is identified within the dependent portions of the right upper lobe and both lower lobes with progression when compared to the chest radiograph of 5/20/2021 most consistent with multifocal pneumonia  Jenna Wagoner PLEURA:  Small right pleural effusion is present  HEART/AORTA:  Heart is normal in size  Great vessels are normal in course and caliber  MEDIASTINUM AND EMERSON:  Minimally prominent, likely reactive, mediastinal and bilateral hilar lymph nodes noted  CHEST WALL AND LOWER NECK:   Unremarkable  ABDOMEN LIVER/BILIARY TREE:  Mild hepatomegaly without focal abnormality detected  GALLBLADDER:  No calcified gallstones  No pericholecystic inflammatory change   SPLEEN: Moderate splenomegaly with the spleen measuring 15 9 cm in craniocaudal dimension  PANCREAS:  Unremarkable  ADRENAL GLANDS:  Unremarkable  KIDNEYS/URETERS:  No hydronephrosis or urinary tract calculus  One or more sharply circumscribed subcentimeter renal hypodensities are present, too small to accurately characterize, and statistically most likely benign findings  According to recent literature (Radiology 2019) no further workup of these findings is recommended  STOMACH AND BOWEL:  Moderate retained fecal material   Scattered diverticula  No bowel wall thickening or intestinal obstruction  APPENDIX:  A normal appendix was visualized  ABDOMINOPELVIC CAVITY:  No ascites  No pneumoperitoneum  No lymphadenopathy  VESSELS:  Unremarkable for patient's age  PELVIS REPRODUCTIVE ORGANS:  Mild prostatomegaly  URINARY BLADDER:  Unremarkable  ABDOMINAL WALL/INGUINAL REGIONS:  Small left inguinal fat-containing hernia without bowel involvement  OSSEOUS STRUCTURES:  No acute fracture or destructive osseous lesion  Impression: 1  Alveolar consolidation in both lungs in a dependent fashion as described above most consistent with multifocal pneumonia  Small right pleural effusion  2   Suboptimal opacification of the pulmonary artery as described above with no evidence for large central pulmonary emboli  3   Hepatosplenomegaly  4   Additional findings as noted  The study was marked in [facility] for immediate notification  Workstation performed: [id number]       Labs and pertinent reports reviewed  This note has been generated by voice recognition software system  Therefore, there may be spelling, grammar, and or syntax errors  Please contact if questions arise

## 2021-06-01 NOTE — ASSESSMENT & PLAN NOTE
· Present on admission, as evidenced by a total bilirubin level of 1 90  · Patient without abdominal symptoms  · Noted to be trending down, but also chronically elevated on review  · Alk phos also elevated on admission to 247, trending down to 189 today    · Suspect all related to multiple myeloma

## 2021-06-01 NOTE — ASSESSMENT & PLAN NOTE
· Chronic, likely as a result of MM  · Patient noted to have a platelet count of 56 on admission  · No signs of petechiae noted or active bleeding    · Monitor daily, stable   Recent Labs     05/30/21  0959 05/31/21  0643 06/01/21  0507   PLT 60* 57* 60*

## 2021-06-01 NOTE — PROGRESS NOTES
[address] [facility] Progress Note Mignon Montenegro 1955, 63 y o  male MRN: [id number]  Unit/Bed#: -Keith Encounter: [id number]  Primary Care Provider: Robyn Beltrán MD   Date and time admitted to hospital: 5/29/2021  9:40 AM    * Sepsis due to pneumonia [facility])  Assessment & Plan  · Patient presented to the ED with complaints of shortness of breath and dry cough  · CTA Chest CT Abd/Pelvis (5/29/21): 1  Alveolar consolidation in both lungs in a dependent fashion as described above most consistent with multifocal pneumonia  Small right pleural effusion  2   Suboptimal opacification of the pulmonary artery as described above with no evidence for large central pulmonary emboli  3   Hepatosplenomegaly  4   Additional findings as noted  · Patient meeting sepsis criteria on admission as evidenced by tachycardia, tachypnea  · Blood cultures were obtained x 2 in the emergency department, NGTD  · Continue Rocephin and azithromycin  · Urine Legionella/Strep Pneumoniae, negative  · Sputum culture prelim 2+ gram neg rods and 2+ gram pos cocci  MRSA screen negative  · Initial Procalcitonin 0 62, repeat 0 44 imrpoving  Patient afebrile  · Patient requiring supplemental oxygen to maintain oxygen saturation levels in the mid 90s; wean as able to maintain oxygen saturation levels 92% or better  Recent Labs     05/29/21  1118 05/30/21  0653  06/01/21  0507   WBC  --   --    < > 3 65*   PROCALCITONI  --  0 44*  --   --    BLOODCX No Growth at 48 hrs   --   --   --     < > = values in this interval not displayed  Acute respiratory failure with hypoxia (HCC)  Assessment & Plan  · Likely in setting of multifocal pneumonia    · Respiratory protocol, airway clearance protocol  · Encourage incentive spirometry 10 times an hour  · Supplemental oxygen to maintain O2 sat greater than 92%  · Tessalon perles, mucinex    Multiple myeloma (HCC)  Assessment & Plan  · Continue Revlimid and outpatient follow-up with Hematology (follows at [facility])  · Patient is on his 3rd line of MM treatment x 1 year  CAD (coronary artery disease)  Assessment & Plan  · With history of PCI, no chest pain on admit  · Continue BB / aspirin  · Xarelto stopped given acute anemia; patient has history of seeing Dr Gely Ku, however, unable to find any recent office visit notes  Type 2 diabetes mellitus with kidney complication, without long-term current use of insulin [facility])  Assessment & Plan  Lab Results   Component Value Date    HGBA1C 6 0 (H) 05/04/2021       Recent Labs     05/31/21  1036 05/31/21  1607 05/31/21  2058 06/01/21  0811   POCGLU 237* 201* 214* 174*       Blood Sugar Average: Last 72 hrs:    · Chronic; Well controlled as evidenced by hemoglobin A1C of 6 0  · Takes Glimperide and Januvia at home  · Continue Januvia 50 mg daily  · Diabetic diet  · Monitor blood glucose AC/HS   · Continue ISS    Essential hypertension  Assessment & Plan  · Chronic; continue home medications of Lopressor 25 mg b i d  and Cozaar 25 mg daily  · Monitor vital signs closely  Thrombocytopenia (HCC)  Assessment & Plan  · Chronic, likely as a result of MM  · Patient noted to have a platelet count of 56 on admission  · No signs of petechiae noted or active bleeding  · Monitor daily, stable   Recent Labs     05/30/21  0959 05/31/21  0643 06/01/21  0507   PLT 60* 57* 60*       Stage 3a chronic kidney disease [facility])  Assessment & Plan  Lab Results   Component Value Date    EGFR 65 06/01/2021    EGFR 64 05/31/2021    EGFR 56 05/29/2021    CREATININE 1 18 06/01/2021    CREATININE 1 20 05/31/2021    CREATININE 1 34 (H) 05/29/2021     · Baseline creatinine is 1 2-1 4  · Creatinine currently stable   · Monitor BMP in am   · Avoid nephrotoxins/hypotension      Hyperlipidemia  Assessment & Plan  · Patient has an allergy to Lipitor and Crestor is non formulary, hold statin therapy while hospitalized    Microcytic anemia  Assessment & Plan  · Although this is chronic, baseline hemoglobin appears to be around 9 to 10,  · Hemoglobin on admission noted to be 7 9  · Patient with acute drop in hemoglobin to 6 6 and is now s/p 1 U PRBC with repeat hemoglobin of 7 4, stable  Patient ultimately asymptomatic  · Iron panel completed; also noted to be iron deficient  · GI consult placed; so far, patient without signs of dark stools  · Xarelto stopped  Patient currently taking 2 5 mg Xarelto for CAD indication  · Haptoglobin pending, direct bili 0 91, Hemolysis smear showing helmet cells  · Hematology consult pending, appreciate input    Serum total bilirubin elevated  Assessment & Plan  · Present on admission, as evidenced by a total bilirubin level of 1 90  · Patient without abdominal symptoms  · Noted to be trending down, but also chronically elevated on review  · Alk phos also elevated on admission to 247, trending down to 189 today  · Suspect all related to multiple myeloma         VTE Pharmacologic Prophylaxis: VTE Score: 3 Moderate Risk (Score 3-4) - Pharmacological DVT Prophylaxis Contraindicated  Sequential Compression Devices Ordered  Patient Centered Rounds: I performed bedside rounds with nursing staff today  Discussions with Specialists or Other Care Team Provider: NELLA, await hematology     Education and Discussions with Family / Patient: Patient declined call to   Time Spent for Care: 30 minutes  More than 50% of total time spent on counseling and coordination of care as described above  Current Length of Stay: 3 day(s)  Current Patient Status: Inpatient   Certification Statement: The patient will continue to require additional inpatient hospital stay due to ongoing inpatient treatment of sepsis/pneumonia, workup for anemia  Discharge Plan: Anticipate discharge in 48-72 hrs to discharge location to be determined pending rehab evaluations      Code Status: Level 1 - Full Code    Subjective:   CC I'm doing a little better, can't compare to when I first came in     Patient seen, out of bed to chair  He is doing okay  He does feel better from when he 1st came in, no significant change overnight  He still has a cough with long conversation or deep inspiration  No fevers overnight  No bloody bowel movements  We reviewed his workup thus far an indication for hematology evaluation today, stability in his hemoglobin overnight, and possibility of inpatient EGD and colonoscopy with GI  Patient agreeable to inpatient workup as indicated  Objective:     Vitals:   Temp (24hrs), Av 5 °F (36 4 °C), Min:97 2 °F (36 2 °C), Max:97 7 °F (36 5 °C)    Temp:  [97 2 °F (36 2 °C)-97 7 °F (36 5 °C)] 97 5 °F (36 4 °C)  HR:  [80-90] 90  Resp:  [18-20] 20  BP: (103-120)/(54-58) 119/58  SpO2:  [91 %-95 %] 91 %  Body mass index is 29 33 kg/m²  Input and Output Summary (last 24 hours):   No intake or output data in the 24 hours ending 21 0902    Physical Exam:   Physical Exam  Vitals signs and nursing note reviewed  Constitutional:       General: He is not in acute distress  Appearance: He is obese  He is not ill-appearing or toxic-appearing  Cardiovascular:      Rate and Rhythm: Normal rate and regular rhythm  Pulmonary:      Effort: Pulmonary effort is normal  No accessory muscle usage  Breath sounds: Examination of the right-middle field reveals rales  Examination of the left-middle field reveals rales  Examination of the right-lower field reveals rales  Examination of the left-lower field reveals rales  Decreased breath sounds and rales (scattered) present  Comments: O2 via NC, has a dry sounding cough with deep inspiration or long conversation but does not appear dyspneic   Abdominal:      General: Bowel sounds are normal       Palpations: Abdomen is soft  Comments: Ventral hernia/diastisis recti noted   Musculoskeletal:      Right lower leg: Edema (2+ pitting lower 1/2) present        Left lower leg: Edema (3+ pitting below knee) present  Skin:     Coloration: Skin is pale  Neurological:      Mental Status: He is alert and oriented to person, place, and time  Psychiatric:         Mood and Affect: Mood normal           Additional Data:     Labs:  Results from last 7 days   Lab Units 06/01/21  0507   WBC Thousand/uL 3 65*   HEMOGLOBIN g/dL 7 4*   HEMATOCRIT % 23 1*   PLATELETS Thousands/uL 60*   NEUTROS PCT % 71   LYMPHS PCT % 16   MONOS PCT % 9   EOS PCT % 3     Results from last 7 days   Lab Units 06/01/21  0507 05/31/21  0643   SODIUM mmol/L 135* 136   POTASSIUM mmol/L 3 6 3 5   CHLORIDE mmol/L 103 103   CO2 mmol/L 25 23   BUN mg/dL 17 16   CREATININE mg/dL 1 18 1 20   ANION GAP mmol/L 7 10   CALCIUM mg/dL 8 0* 7 8*   ALBUMIN g/dL  --  1 7*   TOTAL BILIRUBIN mg/dL  --  1 23*   ALK PHOS U/L  --  189*   ALT U/L  --  22   AST U/L  --  17   GLUCOSE RANDOM mg/dL 176* 157*     Results from last 7 days   Lab Units 05/29/21  1044   INR  1 31*     Results from last 7 days   Lab Units 06/01/21  0811 05/31/21  2058 05/31/21  1607 05/31/21  1036 05/31/21  0757 05/30/21  2045 05/30/21  1537 05/30/21  1117 05/30/21  0754 05/29/21  2142 05/29/21  1556   POC GLUCOSE mg/dl 174* 214* 201* 237* 175* 185* 249* 217* 161* 156* 136         Results from last 7 days   Lab Units 05/30/21  0653 05/29/21  1044   LACTIC ACID mmol/L  --  2 0   PROCALCITONIN ng/ml 0 44* 0 62*       Lines/Drains:  Invasive Devices     Peripheral Intravenous Line            Peripheral IV 05/29/21 Right Arm 2 days                      Imaging: No pertinent imaging reviewed  Recent Cultures (last 7 days):   Results from last 7 days   Lab Units 05/31/21  1357 05/29/21  1559 05/29/21  1118 05/29/21  1044   BLOOD CULTURE   --   --  No Growth at 48 hrs  No Growth at 48 hrs     GRAM STAIN RESULT  2+ Epithelial cells per low power field*  2+ Gram negative rods*  2+ Gram positive cocci in pairs*  No polys seen*  --   --   --    LEGIONELLA URINARY ANTIGEN --  Negative  --   --        Last 24 Hours Medication List:   Current Facility-Administered Medications   Medication Dose Route Frequency Provider Last Rate    acetaminophen  650 mg Oral Q6H PRN Thine Clements, MD      acyclovir  400 mg Oral Daily Thien Clements, MD      albuterol  2 5 mg Nebulization Q4H PRN Thien Clements, MD      ALPRAZolam  1 mg Oral BID PRN Thien Clements, MD      aspirin  81 mg Oral Daily Thien Clements, MD      benzonatate  200 mg Oral TID PRN Thien Clements, MD      buPROPion  300 mg Oral Daily Thien Clements, MD      DULoxetine  30 mg Oral Daily Thien Clements, MD      gabapentin  100 mg Oral BID Thien Clements, MD      guaiFENesin  600 mg Oral Q12H [address] Charis Alaniz PA-C      HYDROmorphone  0 5 mg Intravenous Q4H PRN KEKE Krause      insulin lispro  1-6 Units Subcutaneous TID AC Thien Clements, MD      ketorolac  15 mg Intravenous Once KEKE Krause      lenalidomide  5 mg Oral Daily Edgar Larry MD      losartan  25 mg Oral Daily Edgar Larry MD      metoprolol tartrate  25 mg Oral Q12H [address] Thien Clements, MD      oxyCODONE  2 5 mg Oral Q4H PRN KEKE Krause      oxyCODONE  5 mg Oral Q4H PRN KEKE Krause      pantoprazole  40 mg Oral Early Morning Thien Clements MD      sitaGLIPtin  50 mg Oral Daily Thien Clements MD      tamsulosin  0 8 mg Oral Daily With Jaun James MD      traZODone  50 mg Oral Daily Thien Clements MD          Today, Patient Was Seen By: Courtney Cohen PA-C    **Please Note: This note may have been constructed using a voice recognition system  **

## 2021-06-01 NOTE — ASSESSMENT & PLAN NOTE
· Likely in setting of multifocal pneumonia    · Respiratory protocol, airway clearance protocol  · Encourage incentive spirometry 10 times an hour  · Supplemental oxygen to maintain O2 sat greater than 92%  · Tessalon perles, mucinex

## 2021-06-01 NOTE — ASSESSMENT & PLAN NOTE
Lab Results   Component Value Date    HGBA1C 6 0 (H) 05/04/2021       Recent Labs     05/31/21  1036 05/31/21  1607 05/31/21 2058 06/01/21  0811   POCGLU 237* 201* 214* 174*       Blood Sugar Average: Last 72 hrs:    · Chronic; Well controlled as evidenced by hemoglobin A1C of 6 0  · Takes Glimperide and Januvia at home    · Continue Januvia 50 mg daily  · Diabetic diet  · Monitor blood glucose AC/HS   · Continue ISS

## 2021-06-01 NOTE — ASSESSMENT & PLAN NOTE
· Patient presented to the ED with complaints of shortness of breath and dry cough  · CTA Chest CT Abd/Pelvis (5/29/21): 1  Alveolar consolidation in both lungs in a dependent fashion as described above most consistent with multifocal pneumonia  Small right pleural effusion  2   Suboptimal opacification of the pulmonary artery as described above with no evidence for large central pulmonary emboli  3   Hepatosplenomegaly  4   Additional findings as noted  · Patient meeting sepsis criteria on admission as evidenced by tachycardia, tachypnea  · Blood cultures were obtained x 2 in the emergency department, NGTD  · Continue Rocephin and azithromycin  · Urine Legionella/Strep Pneumoniae, negative  · Sputum culture prelim 2+ gram neg rods and 2+ gram pos cocci  MRSA screen negative  · Initial Procalcitonin 0 62, repeat 0 44 imrpoving  Patient afebrile  · Patient requiring supplemental oxygen to maintain oxygen saturation levels in the mid 90s; wean as able to maintain oxygen saturation levels 92% or better  Recent Labs     05/29/21  1118 05/30/21  0653  06/01/21  0507   WBC  --   --    < > 3 65*   PROCALCITONI  --  0 44*  --   --    BLOODCX No Growth at 48 hrs   --   --   --     < > = values in this interval not displayed

## 2021-06-01 NOTE — ASSESSMENT & PLAN NOTE
Lab Results   Component Value Date    EGFR 65 06/01/2021    EGFR 64 05/31/2021    EGFR 56 05/29/2021    CREATININE 1 18 06/01/2021    CREATININE 1 20 05/31/2021    CREATININE 1 34 (H) 05/29/2021     · Baseline creatinine is 1 2-1 4  · Creatinine currently stable   · Monitor BMP in am   · Avoid nephrotoxins/hypotension

## 2021-06-02 VITALS
BODY MASS INDEX: 29.42 KG/M2 | RESPIRATION RATE: 20 BRPM | OXYGEN SATURATION: 94 % | HEART RATE: 81 BPM | DIASTOLIC BLOOD PRESSURE: 60 MMHG | TEMPERATURE: 97.9 F | WEIGHT: 198.63 LBS | HEIGHT: 69 IN | SYSTOLIC BLOOD PRESSURE: 117 MMHG

## 2021-06-02 LAB
ANION GAP SERPL CALCULATED.3IONS-SCNC: 9 MMOL/L (ref 4–13)
BACTERIA SPT RESP CULT: ABNORMAL
BUN SERPL-MCNC: 16 MG/DL (ref 5–25)
CALCIUM SERPL-MCNC: 8.3 MG/DL (ref 8.3–10.1)
CHLORIDE SERPL-SCNC: 101 MMOL/L (ref 100–108)
CO2 SERPL-SCNC: 25 MMOL/L (ref 21–32)
CREAT SERPL-MCNC: 1.2 MG/DL (ref 0.6–1.3)
ERYTHROCYTE [DISTWIDTH] IN BLOOD BY AUTOMATED COUNT: 19.7 % (ref 11.6–15.1)
GFR SERPL CREATININE-BSD FRML MDRD: 64 ML/MIN/1.73SQ M
GLUCOSE SERPL-MCNC: 182 MG/DL (ref 65–140)
GLUCOSE SERPL-MCNC: 197 MG/DL (ref 65–140)
GLUCOSE SERPL-MCNC: 228 MG/DL (ref 65–140)
GRAM STN SPEC: ABNORMAL
HCT VFR BLD AUTO: 26.2 % (ref 36.5–49.3)
HGB BLD-MCNC: 8.2 G/DL (ref 12–17)
MCH RBC QN AUTO: 23 PG (ref 26.8–34.3)
MCHC RBC AUTO-ENTMCNC: 31.3 G/DL (ref 31.4–37.4)
MCV RBC AUTO: 73 FL (ref 82–98)
PLATELET # BLD AUTO: 72 THOUSANDS/UL (ref 149–390)
POTASSIUM SERPL-SCNC: 3.7 MMOL/L (ref 3.5–5.3)
RBC # BLD AUTO: 3.57 MILLION/UL (ref 3.88–5.62)
SODIUM SERPL-SCNC: 135 MMOL/L (ref 136–145)
WBC # BLD AUTO: 4.16 THOUSAND/UL (ref 4.31–10.16)

## 2021-06-02 PROCEDURE — NC001 PR NO CHARGE: Performed by: NURSE PRACTITIONER

## 2021-06-02 PROCEDURE — 80048 BASIC METABOLIC PNL TOTAL CA: CPT | Performed by: PHYSICIAN ASSISTANT

## 2021-06-02 PROCEDURE — 94664 DEMO&/EVAL PT USE INHALER: CPT

## 2021-06-02 PROCEDURE — 85027 COMPLETE CBC AUTOMATED: CPT | Performed by: PHYSICIAN ASSISTANT

## 2021-06-02 PROCEDURE — 82948 REAGENT STRIP/BLOOD GLUCOSE: CPT

## 2021-06-02 PROCEDURE — 99239 HOSP IP/OBS DSCHRG MGMT >30: CPT | Performed by: NURSE PRACTITIONER

## 2021-06-02 RX ORDER — AZITHROMYCIN 250 MG/1
250 TABLET, FILM COATED ORAL EVERY 24 HOURS
Status: DISCONTINUED | OUTPATIENT
Start: 2021-06-03 | End: 2021-06-02 | Stop reason: HOSPADM

## 2021-06-02 RX ORDER — CEFPODOXIME PROXETIL 200 MG/1
200 TABLET, FILM COATED ORAL 2 TIMES DAILY WITH MEALS
Status: DISCONTINUED | OUTPATIENT
Start: 2021-06-02 | End: 2021-06-02 | Stop reason: HOSPADM

## 2021-06-02 RX ORDER — CEFPODOXIME PROXETIL 200 MG/1
200 TABLET, FILM COATED ORAL 2 TIMES DAILY WITH MEALS
Qty: 10 TABLET | Refills: 0 | Status: SHIPPED | OUTPATIENT
Start: 2021-06-02 | End: 2021-06-07

## 2021-06-02 RX ORDER — GUAIFENESIN 600 MG
600 TABLET, EXTENDED RELEASE 12 HR ORAL EVERY 12 HOURS SCHEDULED
Qty: 60 TABLET | Refills: 0 | Status: SHIPPED | OUTPATIENT
Start: 2021-06-02 | End: 2021-07-02

## 2021-06-02 RX ORDER — AZITHROMYCIN 500 MG/1
500 TABLET, FILM COATED ORAL EVERY 24 HOURS
Status: COMPLETED | OUTPATIENT
Start: 2021-06-02 | End: 2021-06-02

## 2021-06-02 RX ORDER — AZITHROMYCIN 250 MG/1
250 TABLET, FILM COATED ORAL EVERY 24 HOURS
Qty: 4 TABLET | Refills: 0 | Status: SHIPPED | OUTPATIENT
Start: 2021-06-03 | End: 2021-06-07

## 2021-06-02 RX ADMIN — GUAIFENESIN 600 MG: 600 TABLET ORAL at 09:09

## 2021-06-02 RX ADMIN — METOPROLOL TARTRATE 25 MG: 25 TABLET, FILM COATED ORAL at 09:09

## 2021-06-02 RX ADMIN — AZITHROMYCIN 500 MG: 500 TABLET, FILM COATED ORAL at 13:17

## 2021-06-02 RX ADMIN — SITAGLIPTIN 50 MG: 50 TABLET, FILM COATED ORAL at 09:09

## 2021-06-02 RX ADMIN — BUPROPION 300 MG: 150 TABLET, EXTENDED RELEASE ORAL at 09:15

## 2021-06-02 RX ADMIN — PANTOPRAZOLE SODIUM 40 MG: 40 TABLET, DELAYED RELEASE ORAL at 05:43

## 2021-06-02 RX ADMIN — INSULIN LISPRO 2 UNITS: 100 INJECTION, SOLUTION INTRAVENOUS; SUBCUTANEOUS at 08:00

## 2021-06-02 RX ADMIN — ACYCLOVIR 400 MG: 800 TABLET ORAL at 09:12

## 2021-06-02 RX ADMIN — LOSARTAN POTASSIUM 25 MG: 25 TABLET, FILM COATED ORAL at 09:09

## 2021-06-02 RX ADMIN — DULOXETINE HYDROCHLORIDE 30 MG: 30 CAPSULE, DELAYED RELEASE ORAL at 09:09

## 2021-06-02 RX ADMIN — GABAPENTIN 100 MG: 100 CAPSULE ORAL at 09:09

## 2021-06-02 RX ADMIN — INSULIN LISPRO 2 UNITS: 100 INJECTION, SOLUTION INTRAVENOUS; SUBCUTANEOUS at 11:19

## 2021-06-02 RX ADMIN — ASPIRIN 81 MG: 81 TABLET, COATED ORAL at 09:09

## 2021-06-02 NOTE — DISCHARGE SUMMARY
[address] [facility]     Discharge- Flynn Dowse 1955, 63 y o  male MRN: [id number]  Unit/Bed#: -01 Encounter: [id number]  Primary Care Provider: Rain Reid MD   Date and time admitted to hospital: 5/29/2021  9:40 AM    * Sepsis due to pneumonia [facility])  Assessment & Plan  · Patient presented to the ED with complaints of shortness of breath and dry cough  · CTA Chest CT Abd/Pelvis (5/29/21): 1  Alveolar consolidation in both lungs in a dependent fashion as described above most consistent with multifocal pneumonia  Small right pleural effusion  2   Suboptimal opacification of the pulmonary artery as described above with no evidence for large central pulmonary emboli  3   Hepatosplenomegaly  4   Additional findings as noted  · Patient meeting sepsis criteria on admission as evidenced by tachycardia, tachypnea  · Blood cultures were obtained x 2 in the emergency department, NGTD  · Patient to be discharged with Vantin/Azithromycin  · Urine Legionella/Strep Pneumoniae, negative  · Sputum culture final results noted for 2+ gram neg rods and 2+ gram pos cocci, mixed respiratory jackelin  No MRSA  No pseudomonas  · MRSA screen negative  · Initial Procalcitonin 0 62, repeat 0 44 imrpoving  Patient afebrile  · Patient requiring supplemental oxygen to maintain oxygen saturation levels in the mid 90s; wean as able to maintain oxygen saturation levels 92% or better  Recent Labs     06/02/21  0609   WBC 4 16*       Serum total bilirubin elevated  Assessment & Plan  · Present on admission, as evidenced by a total bilirubin level of 1 90  · Patient without abdominal symptoms  · Noted to be trending down, but also chronically elevated on review  · Alk phos also elevated on admission to 247, trended down to 189  · Suspect all related to multiple myeloma  · Follow up with [facility]      Microcytic anemia  Assessment & Plan  · Although this is chronic, baseline hemoglobin appears to be around 9 to 10,  · Hemoglobin on admission noted to be 7 9  · Patient with acute drop in hemoglobin to 6 6; s/p 1 U PRBC with repeat hemoglobin of 7 4, stable  Patient ultimately asymptomatic  · Iron panel completed; also noted to be iron deficient  · GI consult placed; so far, patient without signs of dark stools  · Will do full GI workup outpatient as patient is stable with hemoccult testing negative x 3   · Xarelto stopped  Patient currently taking 2 5 mg Xarelto for CAD indication  · Haptoglobin 409, direct bili 0 91, Hemolysis smear showing helmet cells  · Hematology consult, appreciate input  · Can restart Xarelto; platelets 70  · Recommending iron replacement under hematology supervision at [facility]  CAD (coronary artery disease)  Assessment & Plan  · With history of PCI, no chest pain on admit  · Continue BB / aspirin  · Restart Xarelto  Stage 3a chronic kidney disease [facility])  Assessment & Plan  Lab Results   Component Value Date    EGFR 64 06/02/2021    EGFR 65 06/01/2021    EGFR 64 05/31/2021    CREATININE 1 20 06/02/2021    CREATININE 1 18 06/01/2021    CREATININE 1 20 05/31/2021     · Baseline creatinine is 1 2-1 4  · Creatinine currently stable   · Avoid nephrotoxins    Acute respiratory failure with hypoxia [facility])  Assessment & Plan  · Likely in setting of multifocal pneumonia  · Respiratory protocol, airway clearance protocol  · Encourage incentive spirometry 10 times an hour  · Continue Tessalon perles, mucinex on discharge  · See plan as noted above  Thrombocytopenia (HCC)  Assessment & Plan  · Chronic, likely as a result of MM  · Patient noted to have a platelet count of 56 on admission  · No signs of petechiae noted or active bleeding  · Platelets are now back to his baseline in the 70s  He is 74 today  · Follow up with [facility]      Recent Labs     05/31/21  0643 06/01/21  0507 06/02/21  0609   PLT 57* 60* 72*       Hyperlipidemia  Assessment & Plan  · Patient has an allergy to Lipitor and Crestor is non formulary, statin held  · Restart Crestor on discharge  Type 2 diabetes mellitus with kidney complication, without long-term current use of insulin [facility])  Assessment & Plan  Lab Results   Component Value Date    HGBA1C 6 0 (H) 05/04/2021       Recent Labs     06/01/21  1608 06/01/21  2126 06/02/21  0618 06/02/21  1115   POCGLU 291* 202* 197* 228*       Blood Sugar Average: Last 72 hrs:    · Chronic; Well controlled as evidenced by hemoglobin A1C of 6 0  · Takes Glimperide and Janumet at home; restart  Multiple myeloma ([address] )  Assessment & Plan  · Continue Revlimid and outpatient follow-up with Hematology (follows at [facility])  · Patient is on his 3rd line of MM treatment x 1 year  Essential hypertension  Assessment & Plan  · Chronic; continue home medications of Lopressor 25 mg b i d  and Cozaar 25 mg daily    Resolved Problems  Date Reviewed: 6/2/2021    None        Discharging Physician / Practitioner: Tyrone Mendoza  PCP: Mj Pendleton MD  Admission Date:   Admission Orders (From admission, onward)     Ordered        05/29/21 1346  Inpatient Admission  Once                   Discharge Date: 06/02/21    Consultations During Hospital Stay:  · Hem/Onc  · GI    Procedures Performed:   · None    Significant Findings / Test Results:   XR chest portable   Final Result by Flaquita Fisher MD (06/01 1652)      Multifocal bilateral pneumonia, greatest in the right upper lobe  Small effusions  No definite pulmonary edema  Workstation performed: [id number]         PE Study with CT abdomen & pelvis with contrast   Final Result by Ashlee Catalan MD (05/29 1227)         1  Alveolar consolidation in both lungs in a dependent fashion as described above most consistent with multifocal pneumonia  Small right pleural effusion  2   Suboptimal opacification of the pulmonary artery as described above with no evidence for large central pulmonary emboli     3  Hepatosplenomegaly  4   Additional findings as noted  The study was marked in [facility] for immediate notification  Workstation performed: [id number]             Incidental Findings:   · None     Test Results Pending at Discharge (will require follow up): · None     Outpatient Tests Requested:  · Follow up with PCP  · Follow up with GI   · Follow up with [facility]  · Follow up with CBC within 5 days  Complications:  None    Reason for Admission: Multifocal Pneumonia    Hospital Course:   Mally Valencia is a 63 y o  male patient who originally presented to the hospital on 5/29/2021 due to increasing shortness of breath and dry cough present for 3-4 days  Patient patient with of diabetes mellitus, hyperlipidemia, hypertension, multiple myeloma, MI  Patient was meeting sepsis criteria on admission as evidenced by tachycardia and tachypnea  Patient was given a dose of IV vancomycin and IV cefepime in the emergency room  Patient with a sputum culture, positive for mixed jackelin  Patient is symptomatically doing better, will be discharged home with oral antibiotics at this time  During his stay, patient had acute anemia with a hemoglobin noted to be 6 6, with no obvious sign of bleeding  Patient was given 1 unit of packed red blood cells with improvement to 7 4  Patient is now 8 2 on day of discharge  Patient also had some thrombocytopenia, which is chronic for him however he did drop down to 56 on admission, his Xarelto was held which he takes for CAD  Patient without signs of petechiae or signs of bleeding  Patient was restarted on his Xarelto  Workup essentially negative, recommended to follow up at [facility] as patient requested  Patient unfortunately did require some supplemental oxygen during his stay, an ambulatory o2 study was completed with the RN   Patient remained above 93% during his ambulatory study and therefore did not need a home oxygen evaluation completed by Respiratory Therapy  Please see above list of diagnoses and related plan for additional information  Condition at Discharge: stable    Discharge Day Visit / Exam:   Subjective:  "I feel better today "  Patient with no complaints of chest pain, shortness of breath, fever, or chills overnight  Feels well, still feels some congestion in his upper chest but reports bringing up clear sputum overnight  Vitals: Blood Pressure: 117/60 (06/02/21 0711)  Pulse: 81 (06/02/21 0711)  Temperature: 97 9 °F (36 6 °C) (06/02/21 0711)  Temp Source: Oral (05/30/21 1413)  Respirations: 20 (06/01/21 2359)  Height: 5' 9" (175 3 cm) (05/31/21 1339)  Weight - Scale: 90 1 kg (198 lb 10 2 oz) (05/30/21 1700)  SpO2: 94 % (06/02/21 0711)     Exam:   Physical Exam  Vitals signs and nursing note reviewed  Cardiovascular:      Rate and Rhythm: Normal rate  Pulses: Normal pulses  Heart sounds: Normal heart sounds  Pulmonary:      Effort: Pulmonary effort is normal       Breath sounds: Decreased breath sounds (throughout) present  Abdominal:      General: Bowel sounds are normal       Palpations: Abdomen is soft  Musculoskeletal: Normal range of motion  Right lower leg: Edema present  Left lower leg: Edema present  Skin:     General: Skin is warm and dry  Capillary Refill: Capillary refill takes less than 2 seconds  Neurological:      Mental Status: He is alert  Mental status is at baseline  Psychiatric:         Mood and Affect: Mood normal        Discussion with Family: Patient declined call to   Discharge instructions/Information to patient and family:   See after visit summary for information provided to patient and family  Provisions for Follow-Up Care:  See after visit summary for information related to follow-up care and any pertinent home health orders  Disposition:   Home    Planned Readmission: None     Discharge Statement:  I spent 35 minutes discharging the patient   This time was spent on the day of discharge  I had direct contact with the patient on the day of discharge  Greater than 50% of the total time was spent examining patient, answering all patient questions, arranging and discussing plan of care with patient as well as directly providing post-discharge instructions  Additional time then spent on discharge activities  Discharge Medications:  See after visit summary for reconciled discharge medications provided to patient and/or family        **Please Note: This note may have been constructed using a voice recognition system**

## 2021-06-02 NOTE — ASSESSMENT & PLAN NOTE
· Patient has an allergy to Lipitor and Crestor is non formulary, statin held  · Restart Crestor on discharge

## 2021-06-02 NOTE — DISCHARGE INSTRUCTIONS
Community Acquired Pneumonia   WHAT YOU NEED TO KNOW:   Community-acquired pneumonia (CAP) is a lung infection that you get outside of a hospital or nursing home setting  Your lungs become inflamed and cannot work well  CAP may be caused by bacteria, viruses, or fungi  DISCHARGE INSTRUCTIONS:   Seek care immediately if:   · You are confused and cannot think clearly  · You have increased trouble breathing  · Your lips or fingernails turn gray or blue  Call your doctor if:   · Your symptoms do not get better, or they get worse  · You are urinating less, or not at all  · You have questions or concerns about your condition or care  Medicines:   · Medicines  may be given to treat a bacterial, viral, or fungal infection  You may also be given medicines to dilate your bronchial tubes to help you breathe more easily  · Take your medicine as directed  Contact your healthcare provider if you think your medicine is not helping or if you have side effects  Tell him or her if you are allergic to any medicine  Keep a list of the medicines, vitamins, and herbs you take  Include the amounts, and when and why you take them  Bring the list or the pill bottles to follow-up visits  Carry your medicine list with you in case of an emergency  Follow up with your doctor within 3 days or as directed: You may need another x-ray  Write down your questions so you remember to ask them during your visits  Deep breathing and coughing:  Deep breathing helps open the air passages in your lungs  Coughing helps bring up mucus from your lungs  Take a deep breath and hold the breath as long as you can  Then push the air out of your lungs with a deep, strong cough  Spit out any mucus you have coughed up  Take 10 deep breaths in a row every hour that you are awake  Remember to follow each deep breath with a cough     Do not smoke or allow others to smoke around you:  Nicotine and other chemicals in cigarettes and cigars can cause lung damage  Ask your healthcare provider for information if you currently smoke and need help to quit  E-cigarettes or smokeless tobacco still contain nicotine  Talk to your healthcare provider before you use these products  Manage CAP at home:   · Breathe warm, moist air  This helps loosen mucus  Loosely place a warm, wet washcloth over your nose and mouth  A room humidifier may also help make the air moist     · Drink liquids as directed  Ask your healthcare provider how much liquid to drink each day and which liquids to drink  Liquids help make mucus thin and easier to get out of your body  · Gently tap your chest   This helps loosen mucus so it is easier to cough  Lie with your head lower than your chest several times a day and tap your chest      · Get plenty of rest   Rest helps your body heal     Prevent CAP:       · Wash your hands often  Wash your hands several times each day  Wash after you use the bathroom, change a child's diaper, and before you prepare or eat food  Use soap and water every time  Rub your soapy hands together, lacing your fingers  Wash the front and back of your hands, and in between your fingers  Use the fingers of one hand to scrub under the fingernails of the other hand  Wash for at least 20 seconds  Rinse with warm, running water for several seconds  Then dry your hands with a clean towel or paper towel  Use hand  that contains alcohol if soap and water are not available  Do not touch your eyes, nose, or mouth without washing your hands first          · Cover a sneeze or cough  Use a tissue that covers your mouth and nose  Throw the tissue away in a trash can right away  Use the bend of your arm if a tissue is not available  Wash your hands well with soap and water or use a hand   Do not stand close to anyone who is sneezing or coughing  · Clean surfaces often  Clean doorknobs, countertops, cell phones, and other surfaces that are touched often  Use a disinfecting wipe, a single-use sponge, or a cloth you can wash and reuse  Use disinfecting  if you do not have wipes  You can create a disinfecting  by mixing 1 part bleach with 10 parts water  · Try to avoid people who have a cold or the flu  If you are sick, stay away from others as much as possible  · Ask about vaccines you may need  You may need a vaccine to help prevent pneumonia  Get an influenza (flu) vaccine every year as soon as recommended, usually in September or October  Your healthcare provider can tell you if you should get any other vaccines, and when to get them  © Copyright [address] [place] [address] Information is for End User's use only and may not be sold, redistributed or otherwise used for commercial purposes  All illustrations and images included in CareNotes® are the copyrighted property of A D A M , Inc  or [facility] Vance Bishop   The above information is an  only  It is not intended as medical advice for individual conditions or treatments  Talk to your doctor, nurse or pharmacist before following any medical regimen to see if it is safe and effective for you

## 2021-06-02 NOTE — ASSESSMENT & PLAN NOTE
Lab Results   Component Value Date    HGBA1C 6 0 (H) 05/04/2021       Recent Labs     06/01/21  1608 06/01/21  2126 06/02/21  0618 06/02/21  1115   POCGLU 291* 202* 197* 228*       Blood Sugar Average: Last 72 hrs:    · Chronic; Well controlled as evidenced by hemoglobin A1C of 6 0  · Takes Glimperide and Janumet at home; restart

## 2021-06-02 NOTE — ASSESSMENT & PLAN NOTE
· Although this is chronic, baseline hemoglobin appears to be around 9 to 10,  · Hemoglobin on admission noted to be 7 9  · Patient with acute drop in hemoglobin to 6 6; s/p 1 U PRBC with repeat hemoglobin of 7 4, stable  Patient ultimately asymptomatic  · Iron panel completed; also noted to be iron deficient  · GI consult placed; so far, patient without signs of dark stools  · Will do full GI workup outpatient as patient is stable with hemoccult testing negative x 3   · Xarelto stopped  Patient currently taking 2 5 mg Xarelto for CAD indication  · Haptoglobin 409, direct bili 0 91, Hemolysis smear showing helmet cells  · Hematology consult, appreciate input  · Can restart Xarelto; platelets 70  · Recommending iron replacement under hematology supervision at [facility]

## 2021-06-02 NOTE — ASSESSMENT & PLAN NOTE
Lab Results   Component Value Date    HGBA1C 6 0 (H) 05/04/2021       Recent Labs     06/01/21  1119 06/01/21  1608 06/01/21  2126 06/02/21  0618   POCGLU 204* 291* 202* 197*       Blood Sugar Average: Last 72 hrs:    · Chronic; Well controlled as evidenced by hemoglobin A1C of 6 0  · Takes Glimperide and Januvia at home    · Continue Januvia 50 mg daily  · Diabetic diet  · Monitor blood glucose AC/HS   · Continue ISS

## 2021-06-02 NOTE — ASSESSMENT & PLAN NOTE
· Patient presented to the ED with complaints of shortness of breath and dry cough  · CTA Chest CT Abd/Pelvis (5/29/21): 1  Alveolar consolidation in both lungs in a dependent fashion as described above most consistent with multifocal pneumonia  Small right pleural effusion  2   Suboptimal opacification of the pulmonary artery as described above with no evidence for large central pulmonary emboli  3   Hepatosplenomegaly  4   Additional findings as noted  · Patient meeting sepsis criteria on admission as evidenced by tachycardia, tachypnea  · Blood cultures were obtained x 2 in the emergency department, NGTD  · Continue Rocephin and azithromycin  · Urine Legionella/Strep Pneumoniae, negative  · Sputum culture prelim 2+ gram neg rods and 2+ gram pos cocci  MRSA screen negative  · Await sputum culture results today  · Initial Procalcitonin 0 62, repeat 0 44 imrpoving  Patient afebrile  · Patient requiring supplemental oxygen to maintain oxygen saturation levels in the mid 90s; wean as able to maintain oxygen saturation levels 92% or better      Recent Labs     06/02/21  0609   WBC 4 16*

## 2021-06-02 NOTE — ASSESSMENT & PLAN NOTE
· Present on admission, as evidenced by a total bilirubin level of 1 90  · Patient without abdominal symptoms  · Noted to be trending down, but also chronically elevated on review  · Alk phos also elevated on admission to 247, trended down to 189  · Suspect all related to multiple myeloma  · Follow up with [facility]

## 2021-06-02 NOTE — ASSESSMENT & PLAN NOTE
· With history of PCI, no chest pain on admit  · Continue BB / aspirin  · Will restart Xarelto as patients labs are rising; platelets now 72

## 2021-06-02 NOTE — PROGRESS NOTES
[address] [facility]  Progress Note Beverly Leach 1955, 63 y o  male MRN: [id number]  Unit/Bed#: -01 Encounter: [id number]  Primary Care Provider: Dominique Gan MD   Date and time admitted to hospital: 5/29/2021  9:40 AM    * Sepsis due to pneumonia [facility])  Assessment & Plan  · Patient presented to the ED with complaints of shortness of breath and dry cough  · CTA Chest CT Abd/Pelvis (5/29/21): 1  Alveolar consolidation in both lungs in a dependent fashion as described above most consistent with multifocal pneumonia  Small right pleural effusion  2   Suboptimal opacification of the pulmonary artery as described above with no evidence for large central pulmonary emboli  3   Hepatosplenomegaly  4   Additional findings as noted  · Patient meeting sepsis criteria on admission as evidenced by tachycardia, tachypnea  · Blood cultures were obtained x 2 in the emergency department, NGTD  · Continue Rocephin and azithromycin  · Urine Legionella/Strep Pneumoniae, negative  · Sputum culture prelim 2+ gram neg rods and 2+ gram pos cocci  MRSA screen negative  · Await sputum culture results today  · Initial Procalcitonin 0 62, repeat 0 44 imrpoving  Patient afebrile  · Patient requiring supplemental oxygen to maintain oxygen saturation levels in the mid 90s; wean as able to maintain oxygen saturation levels 92% or better  Recent Labs     06/02/21  0609   WBC 4 16*       Serum total bilirubin elevated  Assessment & Plan  · Present on admission, as evidenced by a total bilirubin level of 1 90  · Patient without abdominal symptoms  · Noted to be trending down, but also chronically elevated on review  · Alk phos also elevated on admission to 247, trended down to 189    · Suspect all related to multiple myeloma     Microcytic anemia  Assessment & Plan  · Although this is chronic, baseline hemoglobin appears to be around 9 to 10,  · Hemoglobin on admission noted to be 7 9  · Patient with acute drop in hemoglobin to 6 6; s/p 1 U PRBC with repeat hemoglobin of 7 4, stable  Patient ultimately asymptomatic  · Iron panel completed; also noted to be iron deficient  · GI consult placed; so far, patient without signs of dark stools  · Will do full GI workup outpatient as patient is stable with hemoccult testing negative x 3   · Xarelto stopped  Patient currently taking 2 5 mg Xarelto for CAD indication  · Haptoglobin 409, direct bili 0 91, Hemolysis smear showing helmet cells  · Hematology consult, appreciate input  · Can restart Xarelto; platelets 70  · Recommending iron replacement under hematology supervision at [facility]  CAD (coronary artery disease)  Assessment & Plan  · With history of PCI, no chest pain on admit  · Continue BB / aspirin  · Will restart Xarelto as patients labs are rising; platelets now 72    Stage 3a chronic kidney disease [facility])  Assessment & Plan  Lab Results   Component Value Date    EGFR 64 06/02/2021    EGFR 65 06/01/2021    EGFR 64 05/31/2021    CREATININE 1 20 06/02/2021    CREATININE 1 18 06/01/2021    CREATININE 1 20 05/31/2021     · Baseline creatinine is 1 2-1 4  · Creatinine currently stable   · Avoid nephrotoxins/hypotension  Acute respiratory failure with hypoxia (HCC)  Assessment & Plan  · Likely in setting of multifocal pneumonia  · Respiratory protocol, airway clearance protocol  · Encourage incentive spirometry 10 times an hour  · Supplemental oxygen to maintain O2 sat greater than 92%  · Tessalon perlbradley mucinex    Thrombocytopenia (HCC)  Assessment & Plan  · Chronic, likely as a result of MM  · Patient noted to have a platelet count of 56 on admission  · No signs of petechiae noted or active bleeding  · Platelets continue to improve  · Monitor daily, stable       Recent Labs     05/31/21  0643 06/01/21  0507 06/02/21  0609   PLT 57* 60* 72*       Hyperlipidemia  Assessment & Plan  · Patient has an allergy to Lipitor and Crestor is non formulary, hold statin therapy while hospitalized    Type 2 diabetes mellitus with kidney complication, without long-term current use of insulin [facility])  Assessment & Plan  Lab Results   Component Value Date    HGBA1C 6 0 (H) 05/04/2021       Recent Labs     06/01/21  1119 06/01/21  1608 06/01/21  2126 06/02/21  0618   POCGLU 204* 291* 202* 197*       Blood Sugar Average: Last 72 hrs:    · Chronic; Well controlled as evidenced by hemoglobin A1C of 6 0  · Takes Glimperide and Januvia at home  · Continue Januvia 50 mg daily  · Diabetic diet  · Monitor blood glucose AC/HS   · Continue ISS    Multiple myeloma (HCC)  Assessment & Plan  · Continue Revlimid and outpatient follow-up with Hematology (follows at [facility])  · Patient is on his 3rd line of MM treatment x 1 year  Essential hypertension  Assessment & Plan  · Chronic; continue home medications of Lopressor 25 mg b i d  and Cozaar 25 mg daily  · Monitor vital signs closely  VTE Pharmacologic Prophylaxis: VTE Score: 3 Moderate Risk (Score 3-4) - Pharmacological DVT Prophylaxis Contraindicated  Sequential Compression Devices Ordered  Patient Centered Rounds: I performed bedside rounds with nursing staff today  Discussions with Specialists or Other Care Team Provider: Case Management, Hem/Onc note reviewed    Education and Discussions with Family / Patient: Patient declined call to   Time Spent for Care: 20 minutes  More than 50% of total time spent on counseling and coordination of care as described above  Current Length of Stay: 4 day(s)  Current Patient Status: Inpatient   Certification Statement: The patient will continue to require additional inpatient hospital stay due to ongoing treatment in setting of pneumonia, awaiting sputum culture  Discharge Plan: Anticipate discharge later today or tomorrow to home      Code Status: Level 1 - Full Code    Subjective:   CC: "I did bring up some sputum this morning, it wasn't grey or green but it was clear " Patient resting in the recliner chair  Reports feeling better today, oxygen has been turned down to 2L  He denies complaints of chest pain, shortness of breath, fever, or chills overnight  Discussed labs improving including his hemoglobin and platelets  Discussed that we would restart his Xarelto today and await final results of sputum culture  Objective:     Vitals:   Temp (24hrs), Av 8 °F (36 6 °C), Min:97 6 °F (36 4 °C), Max:97 9 °F (36 6 °C)    Temp:  [97 6 °F (36 4 °C)-97 9 °F (36 6 °C)] 97 9 °F (36 6 °C)  HR:  [75-87] 81  Resp:  [16-20] 20  BP: (115-129)/(59-69) 117/60  SpO2:  [93 %-96 %] 94 %  Body mass index is 29 33 kg/m²  Input and Output Summary (last 24 hours): Intake/Output Summary (Last 24 hours) at 2021 0932  Last data filed at 2021 1101  Gross per 24 hour   Intake 920 ml   Output --   Net 920 ml       Physical Exam:   Physical Exam  Vitals signs and nursing note reviewed  Constitutional:       General: He is not in acute distress  Appearance: He is ill-appearing  Cardiovascular:      Rate and Rhythm: Normal rate  Pulses: Normal pulses  Heart sounds: Normal heart sounds  Pulmonary:      Effort: Pulmonary effort is normal       Breath sounds: Examination of the right-upper field reveals rales  Examination of the left-upper field reveals decreased breath sounds  Examination of the right-middle field reveals decreased breath sounds  Examination of the left-middle field reveals decreased breath sounds  Examination of the right-lower field reveals decreased breath sounds  Examination of the left-lower field reveals decreased breath sounds  Decreased breath sounds and rales present  Comments: expectorated clear sputum  Abdominal:      General: Bowel sounds are normal       Palpations: Abdomen is soft  Musculoskeletal: Normal range of motion  Right lower leg: Edema present  Left lower leg: Edema present     Skin:     General: Skin is warm and dry       Capillary Refill: Capillary refill takes less than 2 seconds  Neurological:      Mental Status: He is alert  Mental status is at baseline  Psychiatric:         Mood and Affect: Mood normal        Additional Data:     Labs:  Results from last 7 days   Lab Units 06/02/21  0609 06/01/21  0507   WBC Thousand/uL 4 16* 3 65*   HEMOGLOBIN g/dL 8 2* 7 4*   HEMATOCRIT % 26 2* 23 1*   PLATELETS Thousands/uL 72* 60*   NEUTROS PCT %  --  71   LYMPHS PCT %  --  16   MONOS PCT %  --  9   EOS PCT %  --  3     Results from last 7 days   Lab Units 06/02/21  0609  05/31/21  0643   SODIUM mmol/L 135*   < > 136   POTASSIUM mmol/L 3 7   < > 3 5   CHLORIDE mmol/L 101   < > 103   CO2 mmol/L 25   < > 23   BUN mg/dL 16   < > 16   CREATININE mg/dL 1 20   < > 1 20   ANION GAP mmol/L 9   < > 10   CALCIUM mg/dL 8 3   < > 7 8*   ALBUMIN g/dL  --   --  1 7*   TOTAL BILIRUBIN mg/dL  --   --  1 23*   ALK PHOS U/L  --   --  189*   ALT U/L  --   --  22   AST U/L  --   --  17   GLUCOSE RANDOM mg/dL 182*   < > 157*    < > = values in this interval not displayed       Results from last 7 days   Lab Units 05/29/21  1044   INR  1 31*     Results from last 7 days   Lab Units 06/02/21  0618 06/01/21  2126 06/01/21  1608 06/01/21  1119 06/01/21  0811 05/31/21  2058 05/31/21  1607 05/31/21  1036 05/31/21  0757 05/30/21  2045 05/30/21  1537 05/30/21  1117   POC GLUCOSE mg/dl 197* 202* 291* 204* 174* 214* 201* 237* 175* 185* 249* 217*         Results from last 7 days   Lab Units 05/30/21  0653 05/29/21  1044   LACTIC ACID mmol/L  --  2 0   PROCALCITONIN ng/ml 0 44* 0 62*       Lines/Drains:  Invasive Devices     Peripheral Intravenous Line            Peripheral IV 06/01/21 Right Forearm less than 1 day                      Imaging: Reviewed radiology reports from this admission including: chest xray    Recent Cultures (last 7 days):   Results from last 7 days   Lab Units 05/31/21  1357 05/29/21  1559 05/29/21  1118 05/29/21  1044   BLOOD CULTURE --   --  No Growth at 72 hrs  No Growth at 72 hrs  SPUTUM CULTURE  Culture too young- will reincubate  --   --   --    GRAM STAIN RESULT  2+ Epithelial cells per low power field*  2+ Gram negative rods*  2+ Gram positive cocci in pairs*  No polys seen*  --   --   --    LEGIONELLA URINARY ANTIGEN   --  Negative  --   --        Last 24 Hours Medication List:   Current Facility-Administered Medications   Medication Dose Route Frequency Provider Last Rate    acetaminophen  650 mg Oral Q6H PRN Alvin Monroy MD      acyclovir  400 mg Oral Daily Edgar Larry MD      albuterol  2 5 mg Nebulization Q4H PRN Alvin Monroy MD      ALPRAZolam  1 mg Oral BID PRN Alvin Monroy MD      aspirin  81 mg Oral Daily Edgar Larry MD      benzonatate  200 mg Oral TID PRN Alvin Monroy MD      buPROPion  300 mg Oral Daily Alvin Monroy MD      DULoxetine  30 mg Oral Daily Alvin Monroy MD      gabapentin  100 mg Oral BID Alvin Monroy MD      guaiFENesin  600 mg Oral Q12H [address] Charis Alaniz PA-C      HYDROmorphone  0 5 mg Intravenous Q4H PRN Jarvis Gutierrez, KEKE      insulin lispro  1-6 Units Subcutaneous TID AC Alvin Monroy MD      ketorolac  15 mg Intravenous Once Jarvis Gutierrez, KEKE      losartan  25 mg Oral Daily Edgar Larry MD      metoprolol tartrate  25 mg Oral Q12H [address] Alvin Monroy MD      oxyCODONE  2 5 mg Oral Q4H PRN Jarvis Prudent, CRMERYL      oxyCODONE  5 mg Oral Q4H PRN Jarvis Prudent, CRNP      pantoprazole  40 mg Oral Early Morning Alvin Monroy MD      rivaroxaban  2 5 mg Oral Daily With [employer], KEKE      sitaGLIPtin  50 mg Oral Daily Alvin Monroy MD      tamsulosin  0 8 mg Oral Daily With Helena Lara MD      traZODone  50 mg Oral Daily Alvin Monroy MD          Today, Patient Was Seen By: KEKE Mcbride    **Please Note: This note may have been constructed using a voice recognition system  **

## 2021-06-02 NOTE — DISCHARGE INSTR - AVS FIRST PAGE
· Follow up with PCP within 1 week  · Follow up with [facility]  · Follow up with blood work (CBC) within 5 days of discharge to monitor hemoglobin/platelets

## 2021-06-02 NOTE — ASSESSMENT & PLAN NOTE
· Chronic, likely as a result of MM  · Patient noted to have a platelet count of 56 on admission  · No signs of petechiae noted or active bleeding  · Platelets continue to improve  · Monitor daily, stable       Recent Labs     05/31/21  0643 06/01/21  0507 06/02/21  0609   PLT 57* 60* 72*

## 2021-06-02 NOTE — ASSESSMENT & PLAN NOTE
· Continue Revlimid and outpatient follow-up with Hematology (follows at [facility])  · Patient is on his 3rd line of MM treatment x 1 year

## 2021-06-02 NOTE — ASSESSMENT & PLAN NOTE
· Patient presented to the ED with complaints of shortness of breath and dry cough  · CTA Chest CT Abd/Pelvis (5/29/21): 1  Alveolar consolidation in both lungs in a dependent fashion as described above most consistent with multifocal pneumonia  Small right pleural effusion  2   Suboptimal opacification of the pulmonary artery as described above with no evidence for large central pulmonary emboli  3   Hepatosplenomegaly  4   Additional findings as noted  · Patient meeting sepsis criteria on admission as evidenced by tachycardia, tachypnea  · Blood cultures were obtained x 2 in the emergency department, NGTD  · Patient to be discharged with Vantin/Azithromycin  · Urine Legionella/Strep Pneumoniae, negative  · Sputum culture final results noted for 2+ gram neg rods and 2+ gram pos cocci, mixed respiratory jackelin  No MRSA  No pseudomonas  · MRSA screen negative  · Initial Procalcitonin 0 62, repeat 0 44 imrpoving  Patient afebrile  · Patient requiring supplemental oxygen to maintain oxygen saturation levels in the mid 90s; wean as able to maintain oxygen saturation levels 92% or better      Recent Labs     06/02/21  0609   WBC 4 16*

## 2021-06-02 NOTE — ASSESSMENT & PLAN NOTE
Lab Results   Component Value Date    EGFR 64 06/02/2021    EGFR 65 06/01/2021    EGFR 64 05/31/2021    CREATININE 1 20 06/02/2021    CREATININE 1 18 06/01/2021    CREATININE 1 20 05/31/2021     · Baseline creatinine is 1 2-1 4  · Creatinine currently stable   · Avoid nephrotoxins

## 2021-06-02 NOTE — ASSESSMENT & PLAN NOTE
· Although this is chronic, baseline hemoglobin appears to be around 9 to 10,  · Hemoglobin on admission noted to be 7 9  · Patient with acute drop in hemoglobin to 6 6; s/p 1 U PRBC with repeat hemoglobin of 7 4, stable  Patient ultimately asymptomatic  · Iron panel completed; also noted to be iron deficient  · GI consult placed; so far, patient without signs of dark stools  · Will do full GI workup outpatient as patient is stable with hemoccult testing negative x 3   · Xarelto stopped  Patient currently taking 2 5 mg Xarelto for CAD indication  · Haptoglobin 409, direct bili 0 91, Hemolysis smear showing helmet cells  · Hematology consult, appreciate input  · Can restart Xarelto; platelets 70  · Recommending iron replacement under hematology supervision at [facility]

## 2021-06-02 NOTE — ASSESSMENT & PLAN NOTE
· Likely in setting of multifocal pneumonia  · Respiratory protocol, airway clearance protocol  · Encourage incentive spirometry 10 times an hour  · Continue Tessalon perlbradley mucinex on discharge  · See plan as noted above

## 2021-06-02 NOTE — ASSESSMENT & PLAN NOTE
· Continue Revlimid and outpatient follow-up with Hematology (follows at [facility])  · Patient is on his 3rd line of MM treatment x 1 year

## 2021-06-02 NOTE — ASSESSMENT & PLAN NOTE
· Chronic, likely as a result of MM  · Patient noted to have a platelet count of 56 on admission  · No signs of petechiae noted or active bleeding  · Platelets are now back to his baseline in the 70s  He is 74 today  · Follow up with [facility]      Recent Labs     05/31/21  0643 06/01/21  0507 06/02/21  0609   PLT 57* 60* 72*

## 2021-06-02 NOTE — ASSESSMENT & PLAN NOTE
Lab Results   Component Value Date    EGFR 64 06/02/2021    EGFR 65 06/01/2021    EGFR 64 05/31/2021    CREATININE 1 20 06/02/2021    CREATININE 1 18 06/01/2021    CREATININE 1 20 05/31/2021     · Baseline creatinine is 1 2-1 4  · Creatinine currently stable   · Avoid nephrotoxins/hypotension

## 2021-06-02 NOTE — ASSESSMENT & PLAN NOTE
· Present on admission, as evidenced by a total bilirubin level of 1 90  · Patient without abdominal symptoms  · Noted to be trending down, but also chronically elevated on review  · Alk phos also elevated on admission to 247, trended down to 189    · Suspect all related to multiple myeloma

## 2021-06-02 NOTE — RESPIRATORY THERAPY NOTE
RT Protocol Note  Karo Quinonez 63 y o  male MRN: [id number]  Unit/Bed#: -01 Encounter: [id number]    Assessment    Principal Problem:    Sepsis due to pneumonia [facility])  Active Problems:    Essential hypertension    Multiple myeloma ([facility] )    Type 2 diabetes mellitus with kidney complication, without long-term current use of insulin (HCC)    Hyperlipidemia    Thrombocytopenia (HCC)    Acute respiratory failure with hypoxia (HCC)    Stage 3a chronic kidney disease (HCC)    CAD (coronary artery disease)    Microcytic anemia    Serum total bilirubin elevated      Home Pulmonary Medications:    Home Devices/Therapy: (no home oxygen)    Past Medical History:   Diagnosis Date    Back problem     Diabetes ([facility] )     History of depression     Hyperlipidemia     Hypertension     Multiple myeloma (HCC)     Myocardial infarction ([place] [address] )      Social History     Socioeconomic History    Marital status: /Civil Union     Spouse name: None    Number of children: None    Years of education: None    Highest education level: None   Occupational History    None   Social Needs    Financial resource strain: None    Food insecurity     Worry: None     Inability: None    Transportation needs     Medical: None     Non-medical: None   Tobacco Use    Smoking status: Never Smoker    Smokeless tobacco: Never Used   Substance and Sexual Activity    Alcohol use: Yes     Frequency: Monthly or less     Drinks per session: 1 or 2     Binge frequency: Never     Comment: socially     Drug use: No    Sexual activity: Yes   Lifestyle    Physical activity     Days per week: 0 days     Minutes per session: 0 min    Stress: Not at all   Relationships    Social connections     Talks on phone: None     Gets together: None     Attends Congregational service: None     Active member of club or organization: None     Attends meetings of clubs or organizations: None     Relationship status: None    Intimate partner violence     Fear of current or ex partner: None     Emotionally abused: None     Physically abused: None     Forced sexual activity: None   Other Topics Concern    None   Social History Narrative    None       Subjective         Objective    Physical Exam:   Assessment Type: (P) Assess only  General Appearance: (P) Alert, Awake  Respiratory Pattern: (P) Normal  Chest Assessment: (P) Chest expansion symmetrical  Bilateral Breath Sounds: (P) Diminished, Clear  O2 Device: (P) Nc    Vitals:  Blood pressure 117/60, pulse 75, temperature 97 8 °F (36 6 °C), resp  rate (P) 16, height 5' 9" (1 753 m), weight 90 1 kg (198 lb 10 2 oz), SpO2 94 %  Imaging and other studies: I have personally reviewed pertinent reports        O2 Device: (P) Nc     Plan    Respiratory Plan: No distress/Pulmonary history  Airway Clearance Plan: Incentive Spirometer     Resp Comments: (P) no distress no intervention needed pt will call no ACP needed I/S on oen

## 2021-06-02 NOTE — PLAN OF CARE
Problem: Potential for Falls  Goal: Patient will remain free of falls  Description: INTERVENTIONS:  - Assess patient frequently for physical needs  -  Identify cognitive and physical deficits and behaviors that affect risk of falls    -  [place] fall precautions as indicated by assessment   - Educate patient/family on patient safety including physical limitations  - Instruct patient to call for assistance with activity based on assessment  - Modify environment to reduce risk of injury  - Consider OT/PT consult to assist with strengthening/mobility  Outcome: Progressing     Problem: PAIN - ADULT  Goal: Verbalizes/displays adequate comfort level or baseline comfort level  Description: Interventions:  - Encourage patient to monitor pain and request assistance  - Assess pain using appropriate pain scale  - Administer analgesics based on type and severity of pain and evaluate response  - Implement non-pharmacological measures as appropriate and evaluate response  - Consider cultural and social influences on pain and pain management  - Notify physician/advanced practitioner if interventions unsuccessful or patient reports new pain  Outcome: Progressing     Problem: INFECTION - ADULT  Goal: Absence or prevention of progression during hospitalization  Description: INTERVENTIONS:  - Assess and monitor for signs and symptoms of infection  - Monitor lab/diagnostic results  - Monitor all insertion sites, i e  indwelling lines, tubes, and drains  - Monitor endotracheal if appropriate and nasal secretions for changes in amount and color  - [place] appropriate cooling/warming therapies per order  - Administer medications as ordered  - Instruct and encourage patient and family to use good hand hygiene technique  - Identify and instruct in appropriate isolation precautions for identified infection/condition  Outcome: Progressing     Problem: SAFETY ADULT  Goal: Patient will remain free of falls  Description: INTERVENTIONS:  - Assess patient frequently for physical needs  -  Identify cognitive and physical deficits and behaviors that affect risk of falls    -  [place] fall precautions as indicated by assessment   - Educate patient/family on patient safety including physical limitations  - Instruct patient to call for assistance with activity based on assessment  - Modify environment to reduce risk of injury  - Consider OT/PT consult to assist with strengthening/mobility  Outcome: Progressing  Goal: Maintain or return to baseline ADL function  Description: INTERVENTIONS:  -  Assess patient's ability to carry out ADLs; assess patient's baseline for ADL function and identify physical deficits which impact ability to perform ADLs (bathing, care of mouth/teeth, toileting, grooming, dressing, etc )  - Assess/evaluate cause of self-care deficits   - Assess range of motion  - Assess patient's mobility; develop plan if impaired  - Assess patient's need for assistive devices and provide as appropriate  - Encourage maximum independence but intervene and supervise when necessary  - Involve family in performance of ADLs  - Assess for home care needs following discharge   - Consider OT consult to assist with ADL evaluation and planning for discharge  - Provide patient education as appropriate  Outcome: Progressing  Goal: Maintain or return mobility status to optimal level  Description: INTERVENTIONS:  - Assess patient's baseline mobility status (ambulation, transfers, stairs, etc )    - Identify cognitive and physical deficits and behaviors that affect mobility  - Identify mobility aids required to assist with transfers and/or ambulation (gait belt, sit-to-stand, lift, walker, cane, etc )  - [place] fall precautions as indicated by assessment  - Record patient progress and toleration of activity level on Mobility SBAR; progress patient to next Phase/Stage  - Instruct patient to call for assistance with activity based on assessment  - Consider rehabilitation consult to assist with strengthening/weightbearing, etc   Outcome: Progressing     Problem: DISCHARGE PLANNING  Goal: Discharge to home or other facility with appropriate resources  Description: INTERVENTIONS:  - Identify barriers to discharge w/patient and caregiver  - Arrange for needed discharge resources and transportation as appropriate  - Identify discharge learning needs (meds, wound care, etc )  - Arrange for interpretive services to assist at discharge as needed  - Refer to Case Management Department for coordinating discharge planning if the patient needs post-hospital services based on physician/advanced practitioner order or complex needs related to functional status, cognitive ability, or social support system  Outcome: Progressing     Problem: Knowledge Deficit  Goal: Patient/family/caregiver demonstrates understanding of disease process, treatment plan, medications, and discharge instructions  Description: Complete learning assessment and assess knowledge base  Interventions:  - Provide teaching at level of understanding  - Provide teaching via preferred learning methods  Outcome: Progressing     Problem: Nutrition/Hydration-ADULT  Goal: Nutrient/Hydration intake appropriate for improving, restoring or maintaining nutritional needs  Description: Monitor and assess patient's nutrition/hydration status for malnutrition  Collaborate with interdisciplinary team and initiate plan and interventions as ordered  Monitor patient's weight and dietary intake as ordered or per policy  Utilize nutrition screening tool and intervene as necessary  Determine patient's food preferences and provide high-protein, high-caloric foods as appropriate       INTERVENTIONS:  - Monitor oral intake, urinary output, labs, and treatment plans  - Assess nutrition and hydration status and recommend course of action  - Evaluate amount of meals eaten  - Assist patient with eating if necessary   - Allow adequate time for meals  - Recommend/ encourage appropriate diets, oral nutritional supplements, and vitamin/mineral supplements  - Order, calculate, and assess calorie counts as needed  - Recommend, monitor, and adjust tube feedings and TPN/PPN based on assessed needs  - Assess need for intravenous fluids  - Provide specific nutrition/hydration education as appropriate  - Include patient/family/caregiver in decisions related to nutrition  Outcome: Progressing

## 2021-06-03 ENCOUNTER — TRANSITIONAL CARE MANAGEMENT (OUTPATIENT)
Dept: INTERNAL MEDICINE CLINIC | Facility: CLINIC | Age: 66
End: 2021-06-03

## 2021-06-04 LAB
BACTERIA BLD CULT: NORMAL
BACTERIA BLD CULT: NORMAL

## 2021-06-07 ENCOUNTER — TRANSCRIBE ORDERS (OUTPATIENT)
Dept: ADMINISTRATIVE | Facility: HOSPITAL | Age: 66
End: 2021-06-07

## 2021-06-07 ENCOUNTER — APPOINTMENT (OUTPATIENT)
Dept: LAB | Facility: HOSPITAL | Age: 66
End: 2021-06-07
Payer: MEDICARE

## 2021-06-07 ENCOUNTER — TELEPHONE (OUTPATIENT)
Dept: UROLOGY | Facility: CLINIC | Age: 66
End: 2021-06-07

## 2021-06-07 DIAGNOSIS — C90.00 MULTIPLE MYELOMA, REMISSION STATUS UNSPECIFIED (HCC): Primary | ICD-10-CM

## 2021-06-07 DIAGNOSIS — D50.9 MICROCYTIC ANEMIA: Primary | ICD-10-CM

## 2021-06-07 DIAGNOSIS — C90.00 MULTIPLE MYELOMA, REMISSION STATUS UNSPECIFIED (HCC): ICD-10-CM

## 2021-06-07 LAB
ABO GROUP BLD: NORMAL
BLD GP AB SCN SERPL QL: NEGATIVE
ERYTHROCYTE [DISTWIDTH] IN BLOOD BY AUTOMATED COUNT: 21.5 % (ref 11.6–15.1)
HCT VFR BLD AUTO: 28.3 % (ref 36.5–49.3)
HGB BLD-MCNC: 8.9 G/DL (ref 12–17)
MCH RBC QN AUTO: 23.4 PG (ref 26.8–34.3)
MCHC RBC AUTO-ENTMCNC: 31.4 G/DL (ref 31.4–37.4)
MCV RBC AUTO: 75 FL (ref 82–98)
PLATELET # BLD AUTO: 97 THOUSANDS/UL (ref 149–390)
RBC # BLD AUTO: 3.8 MILLION/UL (ref 3.88–5.62)
RH BLD: POSITIVE
SPECIMEN EXPIRATION DATE: NORMAL
WBC # BLD AUTO: 5.28 THOUSAND/UL (ref 4.31–10.16)

## 2021-06-07 PROCEDURE — 36415 COLL VENOUS BLD VENIPUNCTURE: CPT

## 2021-06-07 PROCEDURE — 86850 RBC ANTIBODY SCREEN: CPT

## 2021-06-07 PROCEDURE — 85027 COMPLETE CBC AUTOMATED: CPT

## 2021-06-07 PROCEDURE — 86901 BLOOD TYPING SEROLOGIC RH(D): CPT

## 2021-06-07 PROCEDURE — 86900 BLOOD TYPING SEROLOGIC ABO: CPT

## 2021-06-09 ENCOUNTER — OFFICE VISIT (OUTPATIENT)
Dept: INTERNAL MEDICINE CLINIC | Facility: CLINIC | Age: 66
End: 2021-06-09
Payer: MEDICARE

## 2021-06-09 VITALS
BODY MASS INDEX: 28.19 KG/M2 | TEMPERATURE: 98.2 F | OXYGEN SATURATION: 96 % | WEIGHT: 190.3 LBS | HEIGHT: 69 IN | SYSTOLIC BLOOD PRESSURE: 130 MMHG | DIASTOLIC BLOOD PRESSURE: 68 MMHG | HEART RATE: 96 BPM

## 2021-06-09 DIAGNOSIS — J18.9 PNEUMONIA OF LEFT LOWER LOBE DUE TO INFECTIOUS ORGANISM: Primary | ICD-10-CM

## 2021-06-09 PROBLEM — A41.9 SEPSIS DUE TO PNEUMONIA (HCC): Status: RESOLVED | Noted: 2021-05-29 | Resolved: 2021-06-09

## 2021-06-09 PROBLEM — J96.01 ACUTE RESPIRATORY FAILURE WITH HYPOXIA (HCC): Status: RESOLVED | Noted: 2021-05-29 | Resolved: 2021-06-09

## 2021-06-09 PROCEDURE — 99495 TRANSJ CARE MGMT MOD F2F 14D: CPT | Performed by: INTERNAL MEDICINE

## 2021-06-09 RX ORDER — AZITHROMYCIN 250 MG/1
TABLET, FILM COATED ORAL
Qty: 6 TABLET | Refills: 0 | Status: SHIPPED | OUTPATIENT
Start: 2021-06-09 | End: 2021-06-14

## 2021-06-09 RX ORDER — CEFPODOXIME PROXETIL 200 MG/1
200 TABLET, FILM COATED ORAL 2 TIMES DAILY
Qty: 14 TABLET | Refills: 0 | Status: SHIPPED | OUTPATIENT
Start: 2021-06-09 | End: 2021-06-16

## 2021-06-09 NOTE — PROGRESS NOTES
Assessment/Plan:     No problem-specific Assessment & Plan notes found for this encounter  Diagnoses and all orders for this visit:    Pneumonia of left lower lobe due to infectious organism  -     cefpodoxime (VANTIN) 200 mg tablet; Take 1 tablet (200 mg total) by mouth 2 (two) times a day for 7 days  -     azithromycin (ZITHROMAX) 250 mg tablet; 2 RIGHT AWAY THEN 1 DAILY FOR 5 DAYS         Subjective:     Patient ID: Maya Jones is a 63 y o  male  A 63year-old male who has multiple health problems but was treated by multiple specialists  F/u TC pmeumonia  Had 2 organisms  He was sent home with Vantin and Zithromax  He has just completed those antibiotics but continues to complain of a barky dry cough  He is not wheezing   No fever   On examination he has dense crackles in left base  Multiple chronic diagnoses:  Multiple myeloma in remission followed at [facility], on Revlamid   Currently taking acyclovir for prevention of shingles as a part of this process  Major depression treated with bupropion  Diabetic followed by [facility]  BPH followed by Dr Cici Marquez  Coronary artery disease  Myocardial infarction in December 2017  Treated at [facility] with stenting  Currently on medication and followed by Dr Berna Veliz    Only surgical history is tonsillectomy and right inguinal hernia  Occasional alcohol, no drugs no smoking  A retiree from the [employer] authority    A chief complaint verbalized today of coughing which happens while he eats  It is not constant but it does occur  He also has a sensation of difficulty handling his secretions  He has a low-grade sensation of occasionally choking on his saliva  This is been going on for at least 6 months    He has never had radiation therapy in the neck  He has not had any change in his medication regimes in the 6 months since I have last seen him    Another complaint is that of paresthesias of both feet which have developed in the past 6 months  He has a sensation as if he has calluses on the bottom of both feet with a reduction in sensation  Review of Systems   Constitutional: Positive for fatigue  Respiratory: Positive for cough and shortness of breath  All other systems reviewed and are negative  Objective:     Physical Exam  Constitutional:       Comments: Alert male who appears to be the stated age  Verbalizing complaint of cough but only a minimal cough  He looks pale  Cardiovascular:      Rate and Rhythm: Normal rate and regular rhythm  Pulmonary:      Effort: Pulmonary effort is normal  No respiratory distress  Breath sounds: Examination of the left-lower field reveals rales  Rales present  No wheezing or rhonchi  Neurological:      Mental Status: He is alert  Vitals:    06/09/21 0916   BP: 130/68   BP Location: Left arm   Patient Position: Sitting   Cuff Size: Standard   Pulse: 96   Temp: 98 2 °F (36 8 °C)   TempSrc: Temporal   SpO2: 96%   Weight: 86 3 kg (190 lb 4 8 oz)   Height: 5' 9" (1 753 m)       Transitional Care Management Review:  Jesse Whipple is a 63 y o  male here for TCM follow up  During the TCM phone call patient stated:    TCM Call (since 5/9/2021)     Date and time call was made  6/3/2021 11:51 AM    Hospital care reviewed  Records reviewed    Patient was hospitial[facility] at  [facility]        Date of Admission  05/29/21    Date of discharge  06/02/21    Diagnosis  Sepsis due to pneumonia     Disposition  Home    Current Symptoms  Cough; Shortness of breath    Cough Severity  Mild (Comment)  while talking    Shortness of breath severity  Mild (Comment)  related to cough      TCM Call (since 5/9/2021)     Post hospital issues  None    Scheduled for follow up?   Yes    Patients specialists  Urologist    Urologist name  Flavia Truong MD    Urologist contact #  [phone]    Did you obtain your prescribed medications  Yes (Comment)  azithromycin, vantin, mucinex    Do you need help managing your prescriptions or medications  No    Is transportation to your appointment needed  No    I have advised the patient to call PCP with any new or worsening symptoms  Hezzie Carrel, LPN    Are you recieving any outpatient services  Yes    What type of services  lab    Are you recieving home care services  No    Interperter language line needed  No    Counseling  Patient    Counseling topics  Importance of RX compliance          Charlotte Merchant MD

## 2021-06-09 NOTE — PATIENT INSTRUCTIONS
Persistent crackles in the left base in this patient with pneumonia and immunosuppression due to multiple myeloma  Will give him another course of Vantin and Zithromax  Should he get worse, he needs to go back to the emergency room and in fact I suggest going to [facility]

## 2021-06-11 ENCOUNTER — APPOINTMENT (OUTPATIENT)
Dept: LAB | Facility: CLINIC | Age: 66
End: 2021-06-11
Payer: MEDICARE

## 2021-06-11 DIAGNOSIS — C90.00 MULTIPLE MYELOMA, REMISSION STATUS UNSPECIFIED (HCC): ICD-10-CM

## 2021-06-11 LAB
BASOPHILS # BLD AUTO: 0.05 THOUSANDS/ΜL (ref 0–0.1)
BASOPHILS NFR BLD AUTO: 1 % (ref 0–1)
EOSINOPHIL # BLD AUTO: 0.15 THOUSAND/ΜL (ref 0–0.61)
EOSINOPHIL NFR BLD AUTO: 2 % (ref 0–6)
ERYTHROCYTE [DISTWIDTH] IN BLOOD BY AUTOMATED COUNT: 21.6 % (ref 11.6–15.1)
HCT VFR BLD AUTO: 34.1 % (ref 36.5–49.3)
HGB BLD-MCNC: 10.4 G/DL (ref 12–17)
IMM GRANULOCYTES # BLD AUTO: 0.03 THOUSAND/UL (ref 0–0.2)
IMM GRANULOCYTES NFR BLD AUTO: 0 % (ref 0–2)
LYMPHOCYTES # BLD AUTO: 1.56 THOUSANDS/ΜL (ref 0.6–4.47)
LYMPHOCYTES NFR BLD AUTO: 23 % (ref 14–44)
MCH RBC QN AUTO: 23.3 PG (ref 26.8–34.3)
MCHC RBC AUTO-ENTMCNC: 30.5 G/DL (ref 31.4–37.4)
MCV RBC AUTO: 76 FL (ref 82–98)
MONOCYTES # BLD AUTO: 0.52 THOUSAND/ΜL (ref 0.17–1.22)
MONOCYTES NFR BLD AUTO: 8 % (ref 4–12)
NEUTROPHILS # BLD AUTO: 4.59 THOUSANDS/ΜL (ref 1.85–7.62)
NEUTS SEG NFR BLD AUTO: 66 % (ref 43–75)
NRBC BLD AUTO-RTO: 0 /100 WBCS
PLATELET # BLD AUTO: 239 THOUSANDS/UL (ref 149–390)
RBC # BLD AUTO: 4.47 MILLION/UL (ref 3.88–5.62)
WBC # BLD AUTO: 6.9 THOUSAND/UL (ref 4.31–10.16)

## 2021-06-11 PROCEDURE — 36415 COLL VENOUS BLD VENIPUNCTURE: CPT

## 2021-06-11 PROCEDURE — 85025 COMPLETE CBC W/AUTO DIFF WBC: CPT

## 2021-06-30 ENCOUNTER — TELEPHONE (OUTPATIENT)
Dept: INTERNAL MEDICINE CLINIC | Facility: CLINIC | Age: 66
End: 2021-06-30

## 2021-07-06 ENCOUNTER — PREP FOR PROCEDURE (OUTPATIENT)
Dept: UROLOGY | Facility: CLINIC | Age: 66
End: 2021-07-06

## 2021-07-06 DIAGNOSIS — E11.9 TYPE 2 DIABETES MELLITUS WITHOUT COMPLICATION, UNSPECIFIED WHETHER LONG TERM INSULIN USE (HCC): ICD-10-CM

## 2021-07-06 DIAGNOSIS — N40.0 BENIGN PROSTATIC HYPERPLASIA, UNSPECIFIED WHETHER LOWER URINARY TRACT SYMPTOMS PRESENT: Primary | ICD-10-CM

## 2021-07-06 DIAGNOSIS — Z79.01 ANTICOAGULATED: ICD-10-CM

## 2021-07-06 NOTE — TELEPHONE ENCOUNTER
Spoke w patients wife and he is RS for 8/19 at the [facility]  He will Dr Manan Rivsa on 8/6 at 10:15 for clearance and have PATs done that week as well  I will mail them a new surgical packet a nd they will contact us w any questions or concerns

## 2021-07-07 ENCOUNTER — OFFICE VISIT (OUTPATIENT)
Dept: INTERNAL MEDICINE CLINIC | Facility: CLINIC | Age: 66
End: 2021-07-07
Payer: MEDICARE

## 2021-07-07 ENCOUNTER — HOSPITAL ENCOUNTER (OUTPATIENT)
Dept: RADIOLOGY | Facility: HOSPITAL | Age: 66
Discharge: HOME | End: 2021-07-07
Attending: INTERNAL MEDICINE
Payer: MEDICARE

## 2021-07-07 VITALS
HEART RATE: 78 BPM | BODY MASS INDEX: 28.58 KG/M2 | DIASTOLIC BLOOD PRESSURE: 78 MMHG | SYSTOLIC BLOOD PRESSURE: 122 MMHG | HEIGHT: 69 IN | TEMPERATURE: 97.7 F | WEIGHT: 193 LBS | OXYGEN SATURATION: 96 %

## 2021-07-07 DIAGNOSIS — R05.9 COUGH: ICD-10-CM

## 2021-07-07 DIAGNOSIS — R05.9 COUGH: Primary | ICD-10-CM

## 2021-07-07 PROCEDURE — 99214 OFFICE O/P EST MOD 30 MIN: CPT | Performed by: INTERNAL MEDICINE

## 2021-07-07 PROCEDURE — 70220 X-RAY EXAM OF SINUSES: CPT

## 2021-07-07 PROCEDURE — 71046 X-RAY EXAM CHEST 2 VIEWS: CPT

## 2021-07-07 NOTE — PATIENT INSTRUCTIONS
A patient who has respiratory symptoms have improved although not completely resolved  I would like to see x-ray of chest in paranasal sinuses  Further recommendations will be based upon the results of those studies  [facility]   See me back here again in about 6 weeks

## 2021-07-07 NOTE — PROGRESS NOTES
Assessment/Plan:       Diagnoses and all orders for this visit:    Cough  -     XR chest pa & lateral; Future  -     XR sinuses routine 3+ views; Future                Subjective:      Patient ID: Elida Valiente is a 63 y o  male  A 63year-old male who has multiple health problems but was treated by multiple specialists  F/u TCANN pmeumonia  Had 2 organisms  He was sent home with Vantin and Zithromax  He has had several courses of antibiotic and while he is better he still has bouts of coughing too "where I am almost vomiting "  "It is not that it happens very frequently, but when it does, it is scary   "     When I saw him on June 9th, he had dense crackles in the left base  These have cleared  He is not wheezing   No fever     Multiple chronic diagnoses:  Multiple myeloma in remission followed at [facility], on Revlamid   Currently taking acyclovir for prevention of shingles as a part of this process  Major depression treated with bupropion  Diabetic followed by [facility]  BPH followed by Dr Lea Nunez  Coronary artery disease  Myocardial infarction in December 2017  Treated at [address] [place] [address] with stenting  Currently on medication and followed by Dr West Murillo    Only surgical history is tonsillectomy and right inguinal hernia  Occasional alcohol, no drugs no smoking  A retiree from the [employer] authority    Some continued low-grade symptoms which are improved since prior visit  At nighttime he will cough a little bit  Another complaint is that of paresthesias of both feet which have developed in the past 6 months  He has a sensation as if he has calluses on the bottom of both feet with a reduction in sensation  This is unchanged in the past month  Apparently the treating physicians at [facility] believe that the Revlimid is the provocative agent and have placed him on Cymbalta  This has helped "a little "          The following portions of the patient's history were reviewed and updated as appropriate:   He has a past medical history of Back problem, Diabetes ([facility] [address] ), History of depression, Hyperlipidemia, Hypertension, [facility] and Myocardial infarction ([facility] [address] )  ,  does not have any pertinent problems on file  ,   has a past surgical history that includes Hernia repair; TONSILLECTOMY; Cardiac catheterization; Tonsillectomy; and pr colonoscopy flx dx w/collj spec when pfrmd (N/A, 11/16/2018)  ,  family history includes Diabetes in his brother; Heart disease in his father  ,   reports that he has never smoked  He has never used smokeless tobacco  He reports current alcohol use  He reports that he does not use drugs  ,  is allergic to iodinated diagnostic agents, other, trilipix [choline fenofibrate], atorvastatin, and shellfish allergy - food allergy     Current Outpatient Medications   Medication Sig Dispense Refill    acyclovir (ZOVIRAX) 400 MG tablet Take 400 mg by mouth daily  5    ALPRAZolam (XANAX) 1 mg tablet 1 mg 2 (two) times a day as needed  0    aspirin (ECOTRIN LOW STRENGTH) 81 mg EC tablet Take 81 mg by mouth daily      buPROPion (WELLBUTRIN SR) 100 mg 12 hr tablet Take 3 tablets daily in the morning      cholecalciferol (VITAMIN D3) 1,000 units tablet Take 1,000 Units by mouth daily      colesevelam (WELCHOL) 625 mg tablet Take 1,250 mg by mouth 2 (two) times a day      cyanocobalamin (VITAMIN B-12) 1,000 mcg tablet Take by mouth daily      DULoxetine (CYMBALTA) 30 mg delayed release capsule       DULoxetine (CYMBALTA) 60 mg delayed release capsule Take 60 mg by mouth daily      glimepiride (AMARYL) 1 mg tablet Take 1 mg by mouth every morning before breakfast      JANUMET XR  MG TB24 Take 2 tablets by mouth every evening  2    lenalidomide (REVLIMID) 5 MG CAPS Take 5 mg by mouth daily      losartan (COZAAR) 25 mg tablet Take 25 mg by mouth daily  1    metoprolol tartrate (LOPRESSOR) 25 mg tablet Take 25 mg by mouth every 12 (twelve) hours  nitroglycerin (NITROSTAT) 0 4 mg SL tablet PLACE 1 TABLET UNDER TONGUE EVERY 5 MINS 3 TIMES IF NEEDED FOR CHEST PAIN  0    omeprazole (PriLOSEC) 20 mg delayed release capsule Take 20 mg by mouth daily      rosuvastatin (CRESTOR) 5 mg tablet Take 5 mg by mouth daily      sildenafil (REVATIO) 20 mg tablet Take 1 tablet (20 mg total) by mouth 3 (three) times a day Take 2-4 tablets as needed 90 tablet 6    tamsulosin (FLOMAX) 0 4 mg TAKE 2 CAPSULES (0 8 MG TOTAL) BY MOUTH DAILY WITH DINNER 180 capsule 3    traZODone (DESYREL) 50 mg tablet Take 50 mg by mouth daily      Vascepa 1 g CAPS Take 2 capsules by mouth 2 (two) times a day      Xarelto 2 5 MG tablet Take 2 5 mg by mouth 2 (two) times a day      benzonatate (TESSALON) 200 MG capsule Take 1 capsule (200 mg total) by mouth 3 (three) times a day as needed for cough 20 capsule 0    gabapentin (NEURONTIN) 300 mg capsule        No current facility-administered medications for this visit  Review of Systems      Objective:  Vitals:    07/07/21 1103   BP: 122/78   Pulse: 78   Temp: 97 7 °F (36 5 °C)   SpO2: 96%      Physical Exam      Patient Instructions    A patient who has respiratory symptoms have improved although not completely resolved  I would like to see x-ray of chest in paranasal sinuses  Further recommendations will be based upon the results of those studies  [facility]   See me back here again in about 6 weeks

## 2021-07-20 ENCOUNTER — TELEPHONE (OUTPATIENT)
Dept: INTERNAL MEDICINE CLINIC | Facility: CLINIC | Age: 66
End: 2021-07-20

## 2021-07-20 NOTE — TELEPHONE ENCOUNTER
----- Message from Luz Marina Glasgow MD sent at 7/20/2021  9:35 AM EDT -----   Major improvement in prior pulmonary consolidation due to pneumonia  There is still some residual inflammatory change; this is not unusual and should further resolved  We can redo the x-ray in another 3 months

## 2021-08-02 ENCOUNTER — APPOINTMENT (OUTPATIENT)
Dept: LAB | Facility: CLINIC | Age: 66
End: 2021-08-02
Payer: MEDICARE

## 2021-08-02 ENCOUNTER — APPOINTMENT (OUTPATIENT)
Dept: LAB | Facility: HOSPITAL | Age: 66
End: 2021-08-02
Attending: UROLOGY
Payer: MEDICARE

## 2021-08-02 DIAGNOSIS — E11.9 TYPE 2 DIABETES MELLITUS WITHOUT COMPLICATION, UNSPECIFIED WHETHER LONG TERM INSULIN USE (HCC): ICD-10-CM

## 2021-08-02 DIAGNOSIS — Z79.01 ANTICOAGULATED: ICD-10-CM

## 2021-08-02 DIAGNOSIS — I25.10 CORONARY ARTERY DISEASE, UNSPECIFIED VESSEL OR LESION TYPE, UNSPECIFIED WHETHER ANGINA PRESENT, UNSPECIFIED WHETHER NATIVE OR TRANSPLANTED HEART: ICD-10-CM

## 2021-08-02 DIAGNOSIS — N40.0 BENIGN PROSTATIC HYPERPLASIA, UNSPECIFIED WHETHER LOWER URINARY TRACT SYMPTOMS PRESENT: ICD-10-CM

## 2021-08-02 DIAGNOSIS — I10 HYPERTENSION, ESSENTIAL: ICD-10-CM

## 2021-08-02 DIAGNOSIS — E78.2 MIXED HYPERLIPIDEMIA: ICD-10-CM

## 2021-08-02 DIAGNOSIS — R73.01 IMPAIRED FASTING GLUCOSE: ICD-10-CM

## 2021-08-02 LAB
ABO GROUP BLD: NORMAL
ALT SERPL W P-5'-P-CCNC: 22 U/L (ref 12–78)
ANION GAP SERPL CALCULATED.3IONS-SCNC: 6 MMOL/L (ref 4–13)
AST SERPL W P-5'-P-CCNC: 11 U/L (ref 5–45)
BASOPHILS # BLD AUTO: 0.04 THOUSANDS/ΜL (ref 0–0.1)
BASOPHILS NFR BLD AUTO: 1 % (ref 0–1)
BLD GP AB SCN SERPL QL: NEGATIVE
BUN SERPL-MCNC: 18 MG/DL (ref 5–25)
CALCIUM SERPL-MCNC: 8.2 MG/DL (ref 8.3–10.1)
CHLORIDE SERPL-SCNC: 103 MMOL/L (ref 100–108)
CHOLEST SERPL-MCNC: 90 MG/DL (ref 50–200)
CO2 SERPL-SCNC: 27 MMOL/L (ref 21–32)
CREAT SERPL-MCNC: 1.26 MG/DL (ref 0.6–1.3)
EOSINOPHIL # BLD AUTO: 0.51 THOUSAND/ΜL (ref 0–0.61)
EOSINOPHIL NFR BLD AUTO: 11 % (ref 0–6)
ERYTHROCYTE [DISTWIDTH] IN BLOOD BY AUTOMATED COUNT: 19.1 % (ref 11.6–15.1)
EST. AVERAGE GLUCOSE BLD GHB EST-MCNC: 120 MG/DL
GFR SERPL CREATININE-BSD FRML MDRD: 60 ML/MIN/1.73SQ M
GLUCOSE P FAST SERPL-MCNC: 170 MG/DL (ref 65–99)
HBA1C MFR BLD: 5.8 %
HCT VFR BLD AUTO: 32.8 % (ref 36.5–49.3)
HDLC SERPL-MCNC: 40 MG/DL
HGB BLD-MCNC: 10.1 G/DL (ref 12–17)
IMM GRANULOCYTES # BLD AUTO: 0.02 THOUSAND/UL (ref 0–0.2)
IMM GRANULOCYTES NFR BLD AUTO: 0 % (ref 0–2)
LDLC SERPL CALC-MCNC: 25 MG/DL (ref 0–100)
LYMPHOCYTES # BLD AUTO: 0.98 THOUSANDS/ΜL (ref 0.6–4.47)
LYMPHOCYTES NFR BLD AUTO: 21 % (ref 14–44)
MCH RBC QN AUTO: 23.7 PG (ref 26.8–34.3)
MCHC RBC AUTO-ENTMCNC: 30.8 G/DL (ref 31.4–37.4)
MCV RBC AUTO: 77 FL (ref 82–98)
MONOCYTES # BLD AUTO: 0.44 THOUSAND/ΜL (ref 0.17–1.22)
MONOCYTES NFR BLD AUTO: 9 % (ref 4–12)
NEUTROPHILS # BLD AUTO: 2.7 THOUSANDS/ΜL (ref 1.85–7.62)
NEUTS SEG NFR BLD AUTO: 58 % (ref 43–75)
NONHDLC SERPL-MCNC: 50 MG/DL
NRBC BLD AUTO-RTO: 0 /100 WBCS
PLATELET # BLD AUTO: 67 THOUSANDS/UL (ref 149–390)
POTASSIUM SERPL-SCNC: 4.2 MMOL/L (ref 3.5–5.3)
RBC # BLD AUTO: 4.27 MILLION/UL (ref 3.88–5.62)
RH BLD: POSITIVE
SODIUM SERPL-SCNC: 136 MMOL/L (ref 136–145)
SPECIMEN EXPIRATION DATE: NORMAL
TRIGL SERPL-MCNC: 123 MG/DL
WBC # BLD AUTO: 4.69 THOUSAND/UL (ref 4.31–10.16)

## 2021-08-02 PROCEDURE — 85025 COMPLETE CBC W/AUTO DIFF WBC: CPT

## 2021-08-02 PROCEDURE — 86901 BLOOD TYPING SEROLOGIC RH(D): CPT

## 2021-08-02 PROCEDURE — 84450 TRANSFERASE (AST) (SGOT): CPT

## 2021-08-02 PROCEDURE — 36415 COLL VENOUS BLD VENIPUNCTURE: CPT

## 2021-08-02 PROCEDURE — 80061 LIPID PANEL: CPT

## 2021-08-02 PROCEDURE — 83036 HEMOGLOBIN GLYCOSYLATED A1C: CPT

## 2021-08-02 PROCEDURE — 87086 URINE CULTURE/COLONY COUNT: CPT

## 2021-08-02 PROCEDURE — 86850 RBC ANTIBODY SCREEN: CPT

## 2021-08-02 PROCEDURE — 80048 BASIC METABOLIC PNL TOTAL CA: CPT

## 2021-08-02 PROCEDURE — 84460 ALANINE AMINO (ALT) (SGPT): CPT

## 2021-08-02 PROCEDURE — 86900 BLOOD TYPING SEROLOGIC ABO: CPT

## 2021-08-03 LAB — BACTERIA UR CULT: NORMAL

## 2021-08-04 ENCOUNTER — APPOINTMENT (OUTPATIENT)
Dept: PREADMISSION TESTING | Facility: HOSPITAL | Age: 66
End: 2021-08-04
Payer: MEDICARE

## 2021-08-10 ENCOUNTER — ANESTHESIA EVENT (OUTPATIENT)
Dept: PERIOP | Facility: HOSPITAL | Age: 66
End: 2021-08-10
Payer: MEDICARE

## 2021-08-10 NOTE — TELEPHONE ENCOUNTER
Jennifer Coyle from Dr Tray Khoury office and she will be faxing clearance to [facility] location  Pls scan once received, thank you

## 2021-08-12 NOTE — TELEPHONE ENCOUNTER
Patient called in to confirm his surgery is still scheduled for 8/19/21   Patient can be reached at [phone]

## 2021-08-16 NOTE — PRE-PROCEDURE INSTRUCTIONS
Pre-Surgery Instructions:   Medication Instructions    acyclovir (ZOVIRAX) 400 MG tablet take day of surgery    ALPRAZolam (XANAX) 1 mg tablet take day of surgery if needed    aspirin (ECOTRIN LOW STRENGTH) 81 mg EC tablet pt has been instrcuted by physician to hold this med, started holding 8/12/21    buPROPion (WELLBUTRIN SR) 100 mg 12 hr tablet take day of surgery    cholecalciferol (VITAMIN D3) 1,000 units tablet hold day of surgery    colesevelam (WELCHOL) 625 mg tablet hold day of surgery    cyanocobalamin (VITAMIN B-12) 1,000 mcg tablet hold day of surgery    DULoxetine (CYMBALTA) 60 mg delayed release capsule take day of surgery    ELOTUZUMAB IV iv infusion    glimepiride (AMARYL) 1 mg tablet hold day of surgery    JANUMET XR  MG TB24 hold day of surgery    lenalidomide (REVLIMID) 5 MG CAPS take day of surgery    losartan (COZAAR) 25 mg tablet hold day of surgery    metoprolol tartrate (LOPRESSOR) 25 mg tablet take day of surgery    nitroglycerin (NITROSTAT) 0 4 mg SL tablet use if needed    omeprazole (PriLOSEC) 20 mg delayed release capsule take day of surgery    rosuvastatin (CRESTOR) 5 mg tablet takes at night    tamsulosin (FLOMAX) 0 4 mg takes at night    Vascepa 1 g CAPS pt has been instructed to hold by physician, last dose 8/12/21    Xarelto 2 5 MG tablet pt has been instrcuted to hold by physician     My Surgical Experience    The following information was developed to assist you to prepare for your operation  What do I need to do before coming to the hospital?   Arrange for a responsible person to drive you to and from the hospital    Arrange care for your children at home  Children are not allowed in the recovery areas of the hospital   Plan to wear clothing that is easy to put on and take off   If you are having shoulder surgery, wear a shirt that buttons or zippers in the front  Bathing  o Shower the evening before and the morning of your surgery with an antibacterial soap  Please refer to the Pre Op Showering Instructions for Surgery Patients Sheet   o Remove nail polish and all body piercing jewelry  o Do not shave any body part for at least 24 hours before surgery-this includes face, arms, legs and upper body  Food  o Nothing to eat or drink after midnight the night before your surgery  This includes candy and chewing gum  o Exception: If your surgery is after 12:00pm (noon), you may have clear liquids such as 7-Up®, ginger ale, apple or cranberry juice, Jell-O®, water, or clear broth until 8:00 am  o Do not drink milk or juice with pulp on the morning before surgery  o Do not drink alcohol 24 hours before surgery  Medicine  o Follow instructions you received from your surgeon about which medicines you may take on the day of surgery  o If instructed to take medicine on the morning of surgery, take pills with just a small sip of water  Call your prescribing doctor for specific infroamtion on what to do if you take insulin    What should I bring to the hospital?    Bring:  Maximiliano Butt or a walker, if you have them, for foot or knee surgery   A list of the daily medicines, vitamins, minerals, herbals and nutritional supplements you take  Include the dosages of medicines and the time you take them each day   Glasses, dentures or hearing aids   Minimal clothing; you will be wearing hospital sleepwear   Photo ID; required to verify your identity   If you have a Living Will or Power of , bring a copy of the documents   If you have an ostomy, bring an extra pouch and any supplies you use    Do not bring   Medicines or inhalers   Money, valuables or jewelry    What other information should I know about the day of surgery?  Notify your surgeons if you develop a cold, sore throat, cough, fever, rash or any other illness     Report to the Ambulatory Surgical/Same Day Surgery Unit   You will be instructed to stop at Registration only if you have not been pre-registered   Inform your  fi they do not stay that they will be asked by the staff to leave a phone number where they can be reached   Be available to be reached before surgery  In the event the operating room schedule changes, you may be asked to come in earlier or later than expected    *It is important to tell your doctor and others involved in your health care if you are taking or have been taking any non-prescription drugs, vitamins, minerals, herbals or other nutritional supplements   Any of these may interact with some food or medicines and cause a reaction

## 2021-08-19 ENCOUNTER — ANESTHESIA (OUTPATIENT)
Dept: PERIOP | Facility: HOSPITAL | Age: 66
End: 2021-08-19
Payer: MEDICARE

## 2021-08-19 ENCOUNTER — HOSPITAL ENCOUNTER (OUTPATIENT)
Facility: HOSPITAL | Age: 66
Setting detail: OUTPATIENT SURGERY
Discharge: HOME | End: 2021-08-20
Attending: UROLOGY | Admitting: UROLOGY
Payer: MEDICARE

## 2021-08-19 ENCOUNTER — TELEPHONE (OUTPATIENT)
Dept: UROLOGY | Facility: CLINIC | Age: 66
End: 2021-08-19

## 2021-08-19 DIAGNOSIS — R33.8 URINARY RETENTION DUE TO BENIGN PROSTATIC HYPERPLASIA: Primary | ICD-10-CM

## 2021-08-19 DIAGNOSIS — N40.1 URINARY RETENTION DUE TO BENIGN PROSTATIC HYPERPLASIA: Primary | ICD-10-CM

## 2021-08-19 LAB
ERYTHROCYTE [DISTWIDTH] IN BLOOD BY AUTOMATED COUNT: 18.2 % (ref 11.6–15.1)
GLUCOSE SERPL-MCNC: 127 MG/DL (ref 65–140)
GLUCOSE SERPL-MCNC: 174 MG/DL (ref 65–140)
HCT VFR BLD AUTO: 30.6 % (ref 36.5–49.3)
HGB BLD-MCNC: 9.7 G/DL (ref 12–17)
MCH RBC QN AUTO: 23.5 PG (ref 26.8–34.3)
MCHC RBC AUTO-ENTMCNC: 31.7 G/DL (ref 31.4–37.4)
MCV RBC AUTO: 74 FL (ref 82–98)
PLATELET # BLD AUTO: 78 THOUSANDS/UL (ref 149–390)
RBC # BLD AUTO: 4.13 MILLION/UL (ref 3.88–5.62)
WBC # BLD AUTO: 4.51 THOUSAND/UL (ref 4.31–10.16)

## 2021-08-19 PROCEDURE — NC001 PR NO CHARGE: Performed by: UROLOGY

## 2021-08-19 PROCEDURE — 52648 LASER SURGERY OF PROSTATE: CPT | Performed by: UROLOGY

## 2021-08-19 PROCEDURE — 82948 REAGENT STRIP/BLOOD GLUCOSE: CPT

## 2021-08-19 PROCEDURE — P9035 PLATELET PHERES LEUKOREDUCED: HCPCS

## 2021-08-19 PROCEDURE — 85027 COMPLETE CBC AUTOMATED: CPT | Performed by: ANESTHESIOLOGY

## 2021-08-19 RX ORDER — OXYCODONE HYDROCHLORIDE 5 MG/1
10 TABLET ORAL EVERY 4 HOURS PRN
Status: DISCONTINUED | OUTPATIENT
Start: 2021-08-19 | End: 2021-08-20 | Stop reason: HOSPADM

## 2021-08-19 RX ORDER — METOCLOPRAMIDE HYDROCHLORIDE 5 MG/ML
10 INJECTION INTRAMUSCULAR; INTRAVENOUS ONCE AS NEEDED
Status: DISCONTINUED | OUTPATIENT
Start: 2021-08-19 | End: 2021-08-19 | Stop reason: HOSPADM

## 2021-08-19 RX ORDER — ONDANSETRON 2 MG/ML
INJECTION INTRAMUSCULAR; INTRAVENOUS AS NEEDED
Status: DISCONTINUED | OUTPATIENT
Start: 2021-08-19 | End: 2021-08-19

## 2021-08-19 RX ORDER — MIDAZOLAM HYDROCHLORIDE 2 MG/2ML
INJECTION, SOLUTION INTRAMUSCULAR; INTRAVENOUS AS NEEDED
Status: DISCONTINUED | OUTPATIENT
Start: 2021-08-19 | End: 2021-08-19

## 2021-08-19 RX ORDER — CEFAZOLIN SODIUM 1 G/50ML
1000 SOLUTION INTRAVENOUS EVERY 8 HOURS
Status: COMPLETED | OUTPATIENT
Start: 2021-08-19 | End: 2021-08-19

## 2021-08-19 RX ORDER — ALPRAZOLAM 0.5 MG/1
1 TABLET ORAL 2 TIMES DAILY PRN
Status: DISCONTINUED | OUTPATIENT
Start: 2021-08-19 | End: 2021-08-20 | Stop reason: HOSPADM

## 2021-08-19 RX ORDER — BUPROPION HYDROCHLORIDE 150 MG/1
300 TABLET ORAL DAILY
Status: DISCONTINUED | OUTPATIENT
Start: 2021-08-19 | End: 2021-08-20 | Stop reason: HOSPADM

## 2021-08-19 RX ORDER — COLESEVELAM 180 1/1
1250 TABLET ORAL 2 TIMES DAILY
Status: DISCONTINUED | OUTPATIENT
Start: 2021-08-19 | End: 2021-08-20 | Stop reason: HOSPADM

## 2021-08-19 RX ORDER — LOSARTAN POTASSIUM 25 MG/1
25 TABLET ORAL DAILY
Status: DISCONTINUED | OUTPATIENT
Start: 2021-08-19 | End: 2021-08-20 | Stop reason: HOSPADM

## 2021-08-19 RX ORDER — PROPOFOL 10 MG/ML
INJECTION, EMULSION INTRAVENOUS AS NEEDED
Status: DISCONTINUED | OUTPATIENT
Start: 2021-08-19 | End: 2021-08-19

## 2021-08-19 RX ORDER — ONDANSETRON 2 MG/ML
4 INJECTION INTRAMUSCULAR; INTRAVENOUS EVERY 6 HOURS PRN
Status: DISCONTINUED | OUTPATIENT
Start: 2021-08-19 | End: 2021-08-20 | Stop reason: HOSPADM

## 2021-08-19 RX ORDER — LIDOCAINE HYDROCHLORIDE 20 MG/ML
INJECTION, SOLUTION EPIDURAL; INFILTRATION; INTRACAUDAL; PERINEURAL AS NEEDED
Status: DISCONTINUED | OUTPATIENT
Start: 2021-08-19 | End: 2021-08-19

## 2021-08-19 RX ORDER — NITROGLYCERIN 0.4 MG/1
0.4 TABLET SUBLINGUAL
Status: DISCONTINUED | OUTPATIENT
Start: 2021-08-19 | End: 2021-08-20 | Stop reason: HOSPADM

## 2021-08-19 RX ORDER — ATROPA BELLADONNA AND OPIUM 16.2; 3 MG/1; MG/1
1 SUPPOSITORY RECTAL EVERY 6 HOURS PRN
Status: DISCONTINUED | OUTPATIENT
Start: 2021-08-19 | End: 2021-08-20 | Stop reason: HOSPADM

## 2021-08-19 RX ORDER — OXYCODONE HYDROCHLORIDE 5 MG/1
5 TABLET ORAL EVERY 4 HOURS PRN
Status: DISCONTINUED | OUTPATIENT
Start: 2021-08-19 | End: 2021-08-20 | Stop reason: HOSPADM

## 2021-08-19 RX ORDER — ONDANSETRON 2 MG/ML
4 INJECTION INTRAMUSCULAR; INTRAVENOUS ONCE AS NEEDED
Status: DISCONTINUED | OUTPATIENT
Start: 2021-08-19 | End: 2021-08-19 | Stop reason: HOSPADM

## 2021-08-19 RX ORDER — DIPHENHYDRAMINE HCL 25 MG
12.5 TABLET ORAL EVERY 6 HOURS PRN
Status: DISCONTINUED | OUTPATIENT
Start: 2021-08-19 | End: 2021-08-20 | Stop reason: HOSPADM

## 2021-08-19 RX ORDER — OXYCODONE HYDROCHLORIDE 5 MG/1
5 TABLET ORAL EVERY 4 HOURS PRN
Qty: 6 TABLET | Refills: 0 | Status: SHIPPED | OUTPATIENT
Start: 2021-08-19 | End: 2021-08-24

## 2021-08-19 RX ORDER — DOCUSATE SODIUM 100 MG/1
100 CAPSULE, LIQUID FILLED ORAL 3 TIMES DAILY
Qty: 90 CAPSULE | Refills: 0 | Status: SHIPPED | OUTPATIENT
Start: 2021-08-19 | End: 2022-03-16 | Stop reason: DRUGHIGH

## 2021-08-19 RX ORDER — MAGNESIUM HYDROXIDE 1200 MG/15ML
3000 LIQUID ORAL CONTINUOUS
Status: DISCONTINUED | OUTPATIENT
Start: 2021-08-19 | End: 2021-08-20 | Stop reason: HOSPADM

## 2021-08-19 RX ORDER — HYDROMORPHONE HCL/PF 1 MG/ML
0.5 SYRINGE (ML) INJECTION EVERY 2 HOUR PRN
Status: DISCONTINUED | OUTPATIENT
Start: 2021-08-19 | End: 2021-08-20 | Stop reason: HOSPADM

## 2021-08-19 RX ORDER — OXYBUTYNIN CHLORIDE 5 MG/1
5 TABLET, EXTENDED RELEASE ORAL DAILY
Status: DISCONTINUED | OUTPATIENT
Start: 2021-08-19 | End: 2021-08-20 | Stop reason: HOSPADM

## 2021-08-19 RX ORDER — SODIUM CHLORIDE, SODIUM LACTATE, POTASSIUM CHLORIDE, CALCIUM CHLORIDE 600; 310; 30; 20 MG/100ML; MG/100ML; MG/100ML; MG/100ML
INJECTION, SOLUTION INTRAVENOUS CONTINUOUS PRN
Status: DISCONTINUED | OUTPATIENT
Start: 2021-08-19 | End: 2021-08-19

## 2021-08-19 RX ORDER — SODIUM CHLORIDE 9 MG/ML
125 INJECTION, SOLUTION INTRAVENOUS CONTINUOUS
Status: DISCONTINUED | OUTPATIENT
Start: 2021-08-19 | End: 2021-08-20 | Stop reason: HOSPADM

## 2021-08-19 RX ORDER — PROMETHAZINE HYDROCHLORIDE 25 MG/ML
12.5 INJECTION, SOLUTION INTRAMUSCULAR; INTRAVENOUS ONCE AS NEEDED
Status: DISCONTINUED | OUTPATIENT
Start: 2021-08-19 | End: 2021-08-19 | Stop reason: HOSPADM

## 2021-08-19 RX ORDER — ALBUTEROL SULFATE 2.5 MG/3ML
2.5 SOLUTION RESPIRATORY (INHALATION) ONCE AS NEEDED
Status: DISCONTINUED | OUTPATIENT
Start: 2021-08-19 | End: 2021-08-19 | Stop reason: HOSPADM

## 2021-08-19 RX ORDER — DOCUSATE SODIUM 100 MG/1
100 CAPSULE, LIQUID FILLED ORAL 2 TIMES DAILY
Status: DISCONTINUED | OUTPATIENT
Start: 2021-08-19 | End: 2021-08-20 | Stop reason: HOSPADM

## 2021-08-19 RX ORDER — HYDROMORPHONE HCL/PF 1 MG/ML
0.4 SYRINGE (ML) INJECTION
Status: DISCONTINUED | OUTPATIENT
Start: 2021-08-19 | End: 2021-08-19 | Stop reason: HOSPADM

## 2021-08-19 RX ORDER — MAGNESIUM HYDROXIDE/ALUMINUM HYDROXICE/SIMETHICONE 120; 1200; 1200 MG/30ML; MG/30ML; MG/30ML
30 SUSPENSION ORAL EVERY 6 HOURS PRN
Status: DISCONTINUED | OUTPATIENT
Start: 2021-08-19 | End: 2021-08-20 | Stop reason: HOSPADM

## 2021-08-19 RX ORDER — ACYCLOVIR 800 MG/1
400 TABLET ORAL DAILY
Status: DISCONTINUED | OUTPATIENT
Start: 2021-08-19 | End: 2021-08-20 | Stop reason: HOSPADM

## 2021-08-19 RX ORDER — FENTANYL CITRATE 50 UG/ML
INJECTION, SOLUTION INTRAMUSCULAR; INTRAVENOUS AS NEEDED
Status: DISCONTINUED | OUTPATIENT
Start: 2021-08-19 | End: 2021-08-19

## 2021-08-19 RX ORDER — ACETAMINOPHEN 325 MG/1
650 TABLET ORAL EVERY 6 HOURS SCHEDULED
Status: DISCONTINUED | OUTPATIENT
Start: 2021-08-19 | End: 2021-08-20 | Stop reason: HOSPADM

## 2021-08-19 RX ORDER — CEFAZOLIN SODIUM 1 G/50ML
1000 SOLUTION INTRAVENOUS ONCE
Status: COMPLETED | OUTPATIENT
Start: 2021-08-19 | End: 2021-08-19

## 2021-08-19 RX ORDER — FENTANYL CITRATE/PF 50 MCG/ML
50 SYRINGE (ML) INJECTION
Status: DISCONTINUED | OUTPATIENT
Start: 2021-08-19 | End: 2021-08-19 | Stop reason: HOSPADM

## 2021-08-19 RX ORDER — GLYCOPYRROLATE 0.2 MG/ML
INJECTION INTRAMUSCULAR; INTRAVENOUS AS NEEDED
Status: DISCONTINUED | OUTPATIENT
Start: 2021-08-19 | End: 2021-08-19

## 2021-08-19 RX ORDER — LIDOCAINE HYDROCHLORIDE 10 MG/ML
0.5 INJECTION, SOLUTION EPIDURAL; INFILTRATION; INTRACAUDAL; PERINEURAL ONCE AS NEEDED
Status: DISCONTINUED | OUTPATIENT
Start: 2021-08-19 | End: 2021-08-19 | Stop reason: HOSPADM

## 2021-08-19 RX ORDER — TRAZODONE HYDROCHLORIDE 50 MG/1
50 TABLET ORAL
Status: DISCONTINUED | OUTPATIENT
Start: 2021-08-19 | End: 2021-08-20 | Stop reason: HOSPADM

## 2021-08-19 RX ORDER — MAGNESIUM HYDROXIDE 1200 MG/15ML
LIQUID ORAL AS NEEDED
Status: DISCONTINUED | OUTPATIENT
Start: 2021-08-19 | End: 2021-08-19 | Stop reason: HOSPADM

## 2021-08-19 RX ORDER — GLIMEPIRIDE 2 MG/1
1 TABLET ORAL
Status: DISCONTINUED | OUTPATIENT
Start: 2021-08-20 | End: 2021-08-20 | Stop reason: HOSPADM

## 2021-08-19 RX ORDER — SENNOSIDES 8.6 MG
1 TABLET ORAL DAILY
Status: DISCONTINUED | OUTPATIENT
Start: 2021-08-19 | End: 2021-08-20 | Stop reason: HOSPADM

## 2021-08-19 RX ORDER — DULOXETIN HYDROCHLORIDE 60 MG/1
60 CAPSULE, DELAYED RELEASE ORAL DAILY
Status: DISCONTINUED | OUTPATIENT
Start: 2021-08-19 | End: 2021-08-20 | Stop reason: HOSPADM

## 2021-08-19 RX ORDER — PANTOPRAZOLE SODIUM 40 MG/1
40 TABLET, DELAYED RELEASE ORAL
Status: DISCONTINUED | OUTPATIENT
Start: 2021-08-19 | End: 2021-08-20 | Stop reason: HOSPADM

## 2021-08-19 RX ORDER — SULFAMETHOXAZOLE AND TRIMETHOPRIM 800; 160 MG/1; MG/1
1 TABLET ORAL EVERY 12 HOURS SCHEDULED
Qty: 6 TABLET | Refills: 0 | Status: SHIPPED | OUTPATIENT
Start: 2021-08-19 | End: 2021-08-22

## 2021-08-19 RX ORDER — LENALIDOMIDE 5 MG/1
5 CAPSULE ORAL DAILY
Status: DISCONTINUED | OUTPATIENT
Start: 2021-08-19 | End: 2021-08-20 | Stop reason: HOSPADM

## 2021-08-19 RX ORDER — SODIUM CHLORIDE 9 MG/ML
INJECTION, SOLUTION INTRAVENOUS CONTINUOUS PRN
Status: DISCONTINUED | OUTPATIENT
Start: 2021-08-19 | End: 2021-08-19

## 2021-08-19 RX ORDER — ATROPA BELLADONNA AND OPIUM 16.2; 3 MG/1; MG/1
SUPPOSITORY RECTAL AS NEEDED
Status: DISCONTINUED | OUTPATIENT
Start: 2021-08-19 | End: 2021-08-19 | Stop reason: HOSPADM

## 2021-08-19 RX ADMIN — SODIUM CHLORIDE, SODIUM LACTATE, POTASSIUM CHLORIDE, AND CALCIUM CHLORIDE: .6; .31; .03; .02 INJECTION, SOLUTION INTRAVENOUS at 10:30

## 2021-08-19 RX ADMIN — FENTANYL CITRATE 25 MCG: 50 INJECTION, SOLUTION INTRAMUSCULAR; INTRAVENOUS at 10:55

## 2021-08-19 RX ADMIN — SODIUM CHLORIDE FOR IRRIGATION 3000 ML: 0.9 SOLUTION IRRIGATION at 19:18

## 2021-08-19 RX ADMIN — LOSARTAN POTASSIUM 25 MG: 25 TABLET, FILM COATED ORAL at 14:31

## 2021-08-19 RX ADMIN — PROPOFOL 50 MG: 10 INJECTION, EMULSION INTRAVENOUS at 10:45

## 2021-08-19 RX ADMIN — CEFAZOLIN SODIUM 1000 MG: 1 SOLUTION INTRAVENOUS at 14:48

## 2021-08-19 RX ADMIN — FENTANYL CITRATE 25 MCG: 50 INJECTION, SOLUTION INTRAMUSCULAR; INTRAVENOUS at 11:40

## 2021-08-19 RX ADMIN — ACETAMINOPHEN 650 MG: 325 TABLET, FILM COATED ORAL at 18:45

## 2021-08-19 RX ADMIN — CEFAZOLIN SODIUM 1000 MG: 1 SOLUTION INTRAVENOUS at 10:01

## 2021-08-19 RX ADMIN — STANDARDIZED SENNA CONCENTRATE 8.6 MG: 8.6 TABLET ORAL at 14:30

## 2021-08-19 RX ADMIN — CEFAZOLIN SODIUM 1000 MG: 1 SOLUTION INTRAVENOUS at 21:46

## 2021-08-19 RX ADMIN — SODIUM CHLORIDE: 0.9 INJECTION, SOLUTION INTRAVENOUS at 10:45

## 2021-08-19 RX ADMIN — FENTANYL CITRATE 25 MCG: 50 INJECTION, SOLUTION INTRAMUSCULAR; INTRAVENOUS at 11:20

## 2021-08-19 RX ADMIN — GLYCOPYRROLATE 0.2 MG: 0.2 INJECTION, SOLUTION INTRAMUSCULAR; INTRAVENOUS at 10:36

## 2021-08-19 RX ADMIN — FENTANYL CITRATE 50 MCG: 50 INJECTION, SOLUTION INTRAMUSCULAR; INTRAVENOUS at 10:41

## 2021-08-19 RX ADMIN — OXYBUTYNIN 5 MG: 5 TABLET, FILM COATED, EXTENDED RELEASE ORAL at 14:31

## 2021-08-19 RX ADMIN — MIDAZOLAM HYDROCHLORIDE 1 MG: 1 INJECTION, SOLUTION INTRAMUSCULAR; INTRAVENOUS at 10:40

## 2021-08-19 RX ADMIN — PROPOFOL 150 MG: 10 INJECTION, EMULSION INTRAVENOUS at 10:42

## 2021-08-19 RX ADMIN — ONDANSETRON 4 MG: 2 INJECTION INTRAMUSCULAR; INTRAVENOUS at 11:14

## 2021-08-19 RX ADMIN — ACETAMINOPHEN 650 MG: 325 TABLET, FILM COATED ORAL at 23:09

## 2021-08-19 RX ADMIN — PANTOPRAZOLE SODIUM 40 MG: 40 TABLET, DELAYED RELEASE ORAL at 14:31

## 2021-08-19 RX ADMIN — SODIUM CHLORIDE FOR IRRIGATION 3000 ML: 0.9 SOLUTION IRRIGATION at 14:51

## 2021-08-19 RX ADMIN — ACETAMINOPHEN 650 MG: 325 TABLET, FILM COATED ORAL at 14:31

## 2021-08-19 RX ADMIN — SODIUM CHLORIDE 125 ML/HR: 0.9 INJECTION, SOLUTION INTRAVENOUS at 14:50

## 2021-08-19 RX ADMIN — FENTANYL CITRATE 25 MCG: 50 INJECTION, SOLUTION INTRAMUSCULAR; INTRAVENOUS at 11:03

## 2021-08-19 RX ADMIN — FENTANYL CITRATE 25 MCG: 50 INJECTION, SOLUTION INTRAMUSCULAR; INTRAVENOUS at 11:12

## 2021-08-19 RX ADMIN — LIDOCAINE HYDROCHLORIDE 100 MG: 20 INJECTION, SOLUTION EPIDURAL; INFILTRATION; INTRACAUDAL; PERINEURAL at 10:41

## 2021-08-19 RX ADMIN — CYANOCOBALAMIN TAB 500 MCG 1000 MCG: 500 TAB at 14:30

## 2021-08-19 RX ADMIN — METOPROLOL TARTRATE 25 MG: 25 TABLET, FILM COATED ORAL at 21:45

## 2021-08-19 RX ADMIN — DOCUSATE SODIUM 100 MG: 100 CAPSULE, LIQUID FILLED ORAL at 18:46

## 2021-08-19 RX ADMIN — MIDAZOLAM HYDROCHLORIDE 1 MG: 1 INJECTION, SOLUTION INTRAMUSCULAR; INTRAVENOUS at 10:36

## 2021-08-19 RX ADMIN — FENTANYL CITRATE 25 MCG: 50 INJECTION, SOLUTION INTRAMUSCULAR; INTRAVENOUS at 10:50

## 2021-08-19 RX ADMIN — METFORMIN ER 500 MG: 500 TABLET ORAL at 14:31

## 2021-08-19 RX ADMIN — DOCUSATE SODIUM 100 MG: 100 CAPSULE, LIQUID FILLED ORAL at 14:31

## 2021-08-19 NOTE — OP NOTE
OPERATIVE REPORT  PATIENT NAME: Jessika Mathews    :  1955  MRN: [id number]  Pt Location: [facility]OM 04    SURGERY DATE: 2021    Surgeon(s) and Role:     * Parul Marks MD - Primary    Preop Diagnosis:  Urinary retention due to benign prostatic hyperplasia [N40 1, R33 8]    Post-Op Diagnosis Codes:     * Urinary retention due to benign prostatic hyperplasia [N40 1, R33 8]    Procedure(s) (LRB):  TRANSURETHRAL VAPORIZATION OF PROSTATE (TURP) (N/A)    Specimen(s):  * No specimens in log *    Estimated Blood Loss:   5 mL    Drains:  Urethral Catheter Latex; Three way;Straight-tip 24 Fr  (Active)   Number of days: 0       Continuous Bladder Irrigation Three-way (Active)   Number of days: 0       Anesthesia Type:   General/LMA    Operative Indications:  Urinary retention due to benign prostatic hyperplasia [N40 1, R33 8]      Operative Findings:  High-grade obstruction of the prostatic urethra, pictures were taken before and after prostate vaporization  Vaporization with a button electrode in a bipolar fashion was chosen given the patient's history of use of anticoagulants which he stopped in preparation for surgery, also given his history of thrombocytopenia  He was transfuse platelets prior to today's surgery and blood loss was only 5-10 milliliters today    Complications:   None    Procedure and Technique:      INDICATIONS:    Mr Chicho Schafer is a pleasant 61y o  year old man whose outpatient urologic workup revealed high-grade bladder outlet obstruction due to enlarged prostate  Indications for Transurethral resection/vaporization of prostate were discussed with him at length including all risks, benefits, and alternatives of this procedure   Specific risks relevant to this procedure are infection, bleeding, pain, risk of failure of procedure, risk of the need for secondary procedures, risk of urethral or bladder neck stricture or contraction, risk of ejaculatory dysfunction with subsequent fertility issues, risk of tissue regrowth, and risks of anesthesia  Benefits of this procedure include treatment of urinary retention, removal of vascular prostatic adenomatous tissue, potential decreased risk of recurrent urinary tract infection, and treatment of bladder outlet obstruction  Alternatives to this procedure include suprapubic catheter placement, clean intermittent catheterization, and continued medical therapy  After weighing the above information, the patient wished to proceed with the procedure, and witnessed informed consent was obtained  PROCEDURE SUMMARY:    The patient was brought to the operating room and placed in the supine position for the induction of anesthesia  The patient was then placed in the lithotomy position and prepped and draped using standard sterile technique  All pressure points were carefully padded  A full surgical time-out occurred during which the proper patient, position, and procedure were verified  Intravenous antibiotics were then administered as per the guidelines, and thromboembolism prophylaxis was administered in the form of sequential compression devices  A 27 F resectoscope was inserted into the urethra after gentle dilation of the urethral meatus to 30 F using standard urethral sounds  Cystoscopy revealed high-grade bladder outlet obstruction  The location of the ureteral orifices was identified and respected  Vaporization of the prostatic adenomatous tissue was carried down to the floor of the bladder  It was then extended out laterally towards the prostatic capsule which was visualized but not violated  The distal margin of the vaporization was the veru montanum which was also left intact  Hemostasis was obtained using electrocautery after ensuring that the patient's blood pressure was not artificially low due to general anesthesia     A clean bladder was confirmed under direct vision   Each ureteral orifice was again visualized and remained intact at the end of the case  There is no specimen from today's vaporization procedure    A 24 Palestinian three-way Yarbrough catheter was inserted per urethra and was connected to bladder irrigation, which was running clear prior to terminating the procedure  A belladonna and opium suppository was administered in the operating room  The patient was awakened and then transported to PACU in stable condition  There were no complications and all instrument and sponge counts were correct  DISPOSITION:   PACU to the floor  SPECIMENS:  None    Plan:  The plan going forward will be for overnight admission with continuous bladder irrigation  Pending a smooth postoperative course the patient's CBI will be discontinued tomorrow, he will ambulate and mobilize, his diet will be advanced, and he will be discharged home with Yarbrough catheter to leg bag with the plan for Yarbrough removal and trial of void in 3-7 days in the office             I was present for the entire procedure and A qualified resident physician was not available    Patient Disposition:  PACU     SIGNATURE: Diane Jett MD  DATE: August 19, 2021  TIME: 11:43 AM

## 2021-08-19 NOTE — ANESTHESIA PREPROCEDURE EVALUATION
Procedure:  TRANSURETHRAL RESECTION OF PROSTATE (TURP) (N/A Abdomen)    Relevant Problems   CARDIO   (+) CAD (coronary artery disease)   (+) Essential hypertension   (+) Hyperlipidemia      ENDO   (+) Type 2 diabetes mellitus with kidney complication, without long-term current use of insulin (HCC)      GI/HEPATIC   (+) Oropharyngeal dysphagia      /RENAL   (+) Stage 3a chronic kidney disease (HCC)      HEMATOLOGY   (+) Microcytic anemia   (+) Thrombocytopenia (HCC)      NEURO/PSYCH   (+) Recurrent major depressive disorder, in full remission ([facility] [address] )      PULMONARY   (+) Pneumonia of left lower lobe due to infectious organism      Other   (+) Multiple myeloma (HCC)   (+) Neuropathy   (+) Serum total bilirubin elevated   (+) Urinary retention due to benign prostatic hyperplasia        Physical Exam    Airway    Mallampati score: II  TM Distance: >3 FB  Neck ROM: full     Dental   No notable dental hx     Cardiovascular  Cardiovascular exam normal    Pulmonary  Pulmonary exam normal Breath sounds clear to auscultation,     Other Findings      Sinus tachycardia     Confirmed by Jaclyn Ocasio ([id number]) on 6/1/2021 10:16:04 PM    Anesthesia Plan  ASA Score- 3     Anesthesia Type- general with ASA Monitors  Additional Monitors:   Airway Plan: LMA  Plan Factors-Exercise tolerance (METS): >4 METS  Chart reviewed  EKG reviewed  Imaging results reviewed  Existing labs reviewed  Patient summary reviewed  Patient is not a current smoker  Patient instructed to abstain from smoking on day of procedure  Patient did not smoke on day of surgery  Obstructive sleep apnea risk education given perioperatively  Induction- intravenous  Postoperative Plan- Plan for postoperative opioid use  Planned trial extubation    Informed Consent- Anesthetic plan and risks discussed with patient  I personally reviewed this patient with the CRNA  Discussed and agreed on the Anesthesia Plan with the CRNA             Lab Results   Component Value Date    WBC 4 69 08/02/2021    HGB 10 1 (L) 08/02/2021    HCT 32 8 (L) 08/02/2021    MCV 77 (L) 08/02/2021    PLT 67 (L) 08/02/2021     Lab Results   Component Value Date    CALCIUM 8 2 (L) 08/02/2021    K 4 2 08/02/2021    CO2 27 08/02/2021     08/02/2021    BUN 18 08/02/2021    CREATININE 1 26 08/02/2021     Lab Results   Component Value Date    INR 1 31 (H) 05/29/2021    INR 1 37 (H) 05/20/2021    PROTIME 15 6 (H) 05/29/2021    PROTIME 16 2 (H) 05/20/2021     Lab Results   Component Value Date    PTT 40 (H) 05/29/2021     Type and Screen:  O        Discussed with pt the benefits/alternatives and risks or General Anesthesia including breathing tube remaining in place if not strong enough, PONV, damage to lips and teeth, sore throat, eye injury or blindness    I, Dr Gely Delgado, the attending physician, have personally seen and evaluated the patient prior to anesthetic care  I have reviewed the pre-anesthetic record, and other medical records if appropriate to the anesthetic care  If a CRNA is involved in the case, I have reviewed the CRNA assessment, if present, and agree  The patient is in a suitable condition to proceed with my formulated anesthetic plan

## 2021-08-19 NOTE — TELEPHONE ENCOUNTER
The following message has been sent to our clinical team:    The patient is status post transurethral vaporization of prostate, please arrange for Yarbrough removal and trial of void on Monday  Please instruct the patient to taper off of his tamsulosin over the coming weeks

## 2021-08-19 NOTE — DISCHARGE INSTRUCTIONS
Transurethral Prostatectomy (please be advised that a transurethral vaporization of the prostate is very similar and has similar discharge instructions)  WHAT YOU NEED TO KNOW:     Ambarphillip Kirkpatrick,    Today you had a transurethral vaporization of the prostate, your prostatic urethra is nice and open, I think that your symptoms will improve greatly in terms of uroflow and your emptying  I would like you to keep your catheter until Monday  I would like you to stay off of your Eliquis until your urine has been clear for at least 3-4 days  It is normal to have urgency and frequency of urination and some blood in the urine, it is common for the bleeding to resolve and then to have another episode of bleeding roughly 4-5 weeks later as the initial scabs fall off  If you have any issues or questions as you recover, please let us know  Thank you for allowing us to take care of you today  The office should be calling you with regard to a visit for your catheter removal     Dr Vahe Fuentes transurethral prostatectomy is surgery that is done to remove part or all of your prostate gland  This surgery is also called transurethral resection of the prostate (TURP)  DISCHARGE INSTRUCTIONS:   Medicines:   · Pain medicine: You may be given a prescription medicine to decrease pain  Do not wait until the pain is severe before you take this medicine  · Antibiotics: This medicine is given to fight or prevent an infection caused by bacteria  Always take your antibiotics exactly as ordered by your healthcare provider  Do not stop taking your medicine unless directed by your healthcare provider  Never save antibiotics or take leftover antibiotics that were given to you for another illness  · Take your medicine as directed  Contact your healthcare provider if you think your medicine is not helping or if you have side effects  Tell him or her if you are allergic to any medicine   Keep a list of the medicines, vitamins, and herbs you take  Include the amounts, and when and why you take them  Bring the list or the pill bottles to follow-up visits  Carry your medicine list with you in case of an emergency  Follow up with your healthcare provider or urologist as directed: You may need to return to make sure you do not have an infection, or to have your Yarbrough catheter removed  Write down your questions so you remember to ask them during your visits  Yarbrough catheter care: A Yarbrough catheter is a tube put into your bladder to drain your urine into a bag  Keep the bag of urine well below your waist  Lifting the urine bag higher will make the urine flow back into your bladder, which can cause an infection  Do not pull on the catheter  This may cause pain and bleeding, and the catheter may come out  Do not let the catheter tubing kink, because this will block the flow of urine  Ask for more information about how to care for yourself when you have a Yarbrough catheter in place  Bladder control:  After surgery, you may leak urine and have trouble controlling when you urinate  Ask for more information about the following ways to help decrease urine leakage:  · Avoid caffeine:  Caffeine can cause problems with bladder control and increase your need to urinate  · Do pelvic floor muscle exercises:  Pelvic floor muscle exercises may help improve your bladder control, if you leak urine  These exercises are done by tightening and relaxing your pelvic muscles  Ask how to do pelvic floor muscle exercises, and how often to do them  · Limit your liquids:  Drink smaller amounts of liquid throughout the day  Do not drink before bedtime  Ask if you should decrease the amount of liquid you drink each day  This may help you control your bladder  · Wear a pad or adult diapers: These may help to absorb leaking urine and decrease the odor  Activity guidelines:  Ask when it is okay for you to return to work and activities, or to have sex     Contact your healthcare provider or urologist if:   · You have a fever  · You have new or more blood in your urine  · You have trouble starting to urinate, or have a weak stream of urine when you urinate  · You feel like you have a full bladder, even after you urinate  You may also leak urine  · You often wake up during the night to urinate  You may also feel the need to urinate right away  · You feel pain and burning when you urinate  · You feel pain or pressure in your lower abdomen  · Your urine looks cloudy, and smells bad  · You have trouble getting an erection or ejaculating  · You have questions or concerns about your condition or care  Seek care immediately or call 911 if:   · You urinate little or not at all  · You have severe abdominal or back pain  · You are dizzy or confused  · You have abdominal pain, nausea, and vomiting  · Your heartbeat is slower than usual     © Copyright [employer] 2018 Information is for End User's use only and may not be sold, redistributed or otherwise used for commercial purposes  All illustrations and images included in CareNotes® are the copyrighted property of [employer]  or [address] [place], [place] [address]pe   The above information is an  only  It is not intended as medical advice for individual conditions or treatments  Talk to your doctor, nurse or pharmacist before following any medical regimen to see if it is safe and effective for you

## 2021-08-19 NOTE — TELEPHONE ENCOUNTER
Patient scheduled for void trial this Monday 8/23 for 0900 and 1400 in the [place] office  Please make patient aware

## 2021-08-19 NOTE — ANESTHESIA POSTPROCEDURE EVALUATION
Post-Op Assessment Note    CV Status:  Stable  Pain Score: 1    Pain management: adequate     Mental Status:  Awake   Hydration Status:  Euvolemic   PONV Controlled:  Controlled   Airway Patency:  Patent      Post Op Vitals Reviewed: Yes      Staff: CRNA         No complications documented      BP   129/64   Temp  97 2   Pulse 88   Resp 18   SpO2 99% 3L FM

## 2021-08-19 NOTE — H&P
H&P Exam - Urology   Vinita Samuels 61 y o  male MRN: [id number]  Unit/Bed#: OR [place] Encounter: [id number]    Assessment/Plan     Assessment:  72-year-old gentleman with thrombocytopenia and history of bleeding with urinary retention due to an enlarged prostate  Here today for transurethral vaporization of the prostate  He is receiving platelets preoperatively to optimize his outcomes and decreased chance of bleeding  Of note, he has tolerated other surgeries with a similar platelet count without bleeding complications  Plan:  Proceed to transurethral vaporization of the prostate    History of Present Illness   HPI:  Vinita Samuels is a 61 y o  male who presents with uncontrolled lower urinary tract symptoms and urinary retention due to benign prostatic enlargement  Here today for transurethral vaporization of prostate and all indicated procedures    Ready for surgery today  Receiving platelets in the preoperative holding urine to optimize outcomes  The following portions of the patient's history were reviewed and updated as appropriate: allergies, current medications, past family history, past medical history, past social history, past surgical history and problem list     Review of Systems   Constitutional: Negative  HENT: Negative  Eyes: Negative  Respiratory: Negative  Cardiovascular: Negative  Gastrointestinal: Negative  Endocrine: Negative  Genitourinary: Positive for difficulty urinating  Musculoskeletal: Negative  Skin: Negative  Allergic/Immunologic: Negative  Neurological: Negative  Hematological: Negative  Psychiatric/Behavioral: Negative          Historical Information   Past Medical History:   Diagnosis Date    Back problem     Diabetes ([facility] [address] )     Diabetes mellitus ([facility] [address] )     Heartburn     History of depression     History of transfusion     Hyperlipidemia     Hypertension     Multiple myeloma (HCC)     Myocardial infarction ([facility] [address] )     Pneumonia      Past Surgical History:   Procedure Laterality Date    CARDIAC CATHETERIZATION      HERNIA REPAIR      MT COLONOSCOPY FLX DX W/COLLJ SPEC WHEN PFRMD N/A 2018    Procedure: COLONOSCOPY;  Surgeon: Coleta Gaucher, MD;  Location: [facility]; Service: Gastroenterology    TONSILLECTOMY      TONSILLECTOMY       Social History   Social History     Substance and Sexual Activity   Alcohol Use Yes    Comment: socially      Social History     Substance and Sexual Activity   Drug Use No     Social History     Tobacco Use   Smoking Status Never Smoker   Smokeless Tobacco Never Used     E-Cigarette/Vaping    E-Cigarette Use Never User      E-Cigarette/Vaping Substances    Nicotine No     THC No     CBD No     Flavoring No     Other No     Unknown No      Family History:   Family History   Problem Relation Age of Onset    Heart disease Father     Diabetes Brother        Meds/Allergies   PTA meds:   Prior to Admission Medications   Prescriptions Last Dose Informant Patient Reported? Taking?    ALPRAZolam (XANAX) 1 mg tablet More than a month at Unknown time Self Yes No   Si mg 2 (two) times a day as needed   DULoxetine (CYMBALTA) 60 mg delayed release capsule 2021 at Unknown time Self Yes Yes   Sig: Take 60 mg by mouth daily   ELOTUZUMAB IV Past Month at Unknown time  Yes Yes   Sig: Infuse into a venous catheter every 30 (thirty) days   JANUMET XR  MG TB24 Past Week at Unknown time Self Yes Yes   Sig: Take 2 tablets by mouth every evening   Vascepa 1 g CAPS Past Week at Unknown time Self Yes Yes   Sig: Take 2 capsules by mouth 2 (two) times a day   Xarelto 2 5 MG tablet Past Week at Unknown time Self Yes Yes   Sig: Take 2 5 mg by mouth 2 (two) times a day   acyclovir (ZOVIRAX) 400 MG tablet 2021 at Unknown time Self Yes Yes   Sig: Take 400 mg by mouth daily   aspirin (ECOTRIN LOW STRENGTH) 81 mg EC tablet Past Week at Unknown time Self Yes Yes   Sig: Take 81 mg by mouth daily   buPROPion (WELLBUTRIN SR) 100 mg 12 hr tablet 8/19/2021 at Unknown time Self Yes Yes   Sig: Take 3 tablets daily in the morning   cholecalciferol (VITAMIN D3) 1,000 units tablet Past Week at Unknown time Self Yes Yes   Sig: Take 1,000 Units by mouth daily   [facility]) 625 mg tablet 8/18/2021 at Unknown time Self Yes Yes   Sig: Take 1,250 mg by mouth 2 (two) times a day   cyanocobalamin (VITAMIN B-12) 1,000 mcg tablet Past Week at Unknown time Self Yes Yes   Sig: Take by mouth daily   glimepiride (AMARYL) 1 mg tablet Past Week at Unknown time Self Yes Yes   Sig: Take 1 mg by mouth every morning before breakfast   lenalidomide (REVLIMID) 5 MG CAPS Past Week at Unknown time Self Yes Yes   Sig: Take 5 mg by mouth daily   losartan (COZAAR) 25 mg tablet Past Week at Unknown time Self Yes Yes   Sig: Take 25 mg by mouth daily   metoprolol tartrate (LOPRESSOR) 25 mg tablet 8/19/2021 at Unknown time Self Yes Yes   Sig: Take 25 mg by mouth every 12 (twelve) hours   nitroglycerin (NITROSTAT) 0 4 mg SL tablet More than a month at Unknown time Self Yes No   Sig: PLACE 1 TABLET UNDER TONGUE EVERY 5 MINS 3 TIMES IF NEEDED FOR CHEST PAIN   omeprazole (PriLOSEC) 20 mg delayed release capsule 8/18/2021 at Unknown time Self Yes Yes   Sig: Take 20 mg by mouth daily   rosuvastatin (CRESTOR) 5 mg tablet Past Week at Unknown time Self Yes Yes   Sig: Take 5 mg by mouth daily   tamsulosin (FLOMAX) 0 4 mg 8/18/2021 at Unknown time Self No Yes   Sig: TAKE 2 CAPSULES (0 8 MG TOTAL) BY MOUTH DAILY WITH DINNER   traZODone (DESYREL) 50 mg tablet More than a month at Unknown time Self Yes No   Sig: Take 50 mg by mouth as needed       Facility-Administered Medications: None     Allergies   Allergen Reactions    Iodinated Diagnostic Agents Anaphylaxis    Other Anaphylaxis    Trilipix [Choline Fenofibrate]      Other reaction(s): jaundice  Other reaction(s): jaundice    Atorvastatin     Shellfish Allergy - Food Allergy Objective   Vitals: Blood pressure 136/75, pulse 67, temperature 97 6 °F (36 4 °C), temperature source Temporal, resp  rate 18, height 5' 9" (1 753 m), weight 89 9 kg (198 lb 3 1 oz), SpO2 98 %  No intake/output data recorded  Invasive Devices     Peripheral Intravenous Line            Peripheral IV 08/19/21 Distal;Right;Ventral (anterior) Forearm <1 day                Physical Exam  Vitals reviewed  Constitutional:       General: He is not in acute distress  Appearance: Normal appearance  He is not ill-appearing, toxic-appearing or diaphoretic  HENT:      Head: Normocephalic and atraumatic  Eyes:      General: No scleral icterus  Right eye: No discharge  Left eye: No discharge  Cardiovascular:      Pulses: Normal pulses  Pulmonary:      Effort: Pulmonary effort is normal    Abdominal:      General: There is no distension  Palpations: There is no mass  Genitourinary:     Comments: Deferred for OR  Musculoskeletal:         General: No swelling  Skin:     Coloration: Skin is not jaundiced  Neurological:      General: No focal deficit present  Mental Status: He is alert and oriented to person, place, and time  Cranial Nerves: No cranial nerve deficit  Psychiatric:         Mood and Affect: Mood normal          Behavior: Behavior normal          Thought Content: Thought content normal          Judgment: Judgment normal          Lab Results: I have personally reviewed pertinent reports  Imaging: I have personally reviewed pertinent reports  EKG, Pathology, and Other Studies: I have personally reviewed pertinent reports  VTE Prophylaxis: Sequential compression device Freddy Mtz)     Code Status: Prior  Advance Directive and Living Will:      Power of :    POLST:      Counseling / Coordination of Care  Total floor / unit time spent today 15 minutes    Greater than 50% of total time was spent with the patient and / or family counseling and / or coordination of care  A description of the counseling / coordination of care:  Review of today's case

## 2021-08-20 ENCOUNTER — TELEPHONE (OUTPATIENT)
Dept: UROLOGY | Facility: CLINIC | Age: 66
End: 2021-08-20

## 2021-08-20 VITALS
TEMPERATURE: 98.2 F | RESPIRATION RATE: 18 BRPM | WEIGHT: 198.41 LBS | OXYGEN SATURATION: 96 % | BODY MASS INDEX: 29.39 KG/M2 | DIASTOLIC BLOOD PRESSURE: 71 MMHG | HEIGHT: 69 IN | HEART RATE: 87 BPM | SYSTOLIC BLOOD PRESSURE: 139 MMHG

## 2021-08-20 LAB
ABO GROUP BLD BPU: NORMAL
BPU ID: NORMAL
UNIT DISPENSE STATUS: NORMAL
UNIT PRODUCT CODE: NORMAL
UNIT PRODUCT VOLUME: 214 ML
UNIT RH: NORMAL

## 2021-08-20 PROCEDURE — 99024 POSTOP FOLLOW-UP VISIT: CPT | Performed by: NURSE PRACTITIONER

## 2021-08-20 RX ADMIN — PANTOPRAZOLE SODIUM 40 MG: 40 TABLET, DELAYED RELEASE ORAL at 05:02

## 2021-08-20 RX ADMIN — ACETAMINOPHEN 650 MG: 325 TABLET, FILM COATED ORAL at 05:01

## 2021-08-20 RX ADMIN — METFORMIN ER 500 MG: 500 TABLET ORAL at 10:13

## 2021-08-20 RX ADMIN — STANDARDIZED SENNA CONCENTRATE 8.6 MG: 8.6 TABLET ORAL at 10:14

## 2021-08-20 RX ADMIN — BUPROPION 300 MG: 150 TABLET, EXTENDED RELEASE ORAL at 10:12

## 2021-08-20 RX ADMIN — OXYBUTYNIN 5 MG: 5 TABLET, FILM COATED, EXTENDED RELEASE ORAL at 10:16

## 2021-08-20 RX ADMIN — ACYCLOVIR 400 MG: 800 TABLET ORAL at 10:19

## 2021-08-20 RX ADMIN — DOCUSATE SODIUM 100 MG: 100 CAPSULE, LIQUID FILLED ORAL at 10:16

## 2021-08-20 RX ADMIN — GLIMEPIRIDE 1 MG: 2 TABLET ORAL at 10:23

## 2021-08-20 RX ADMIN — DULOXETINE HYDROCHLORIDE 60 MG: 60 CAPSULE, DELAYED RELEASE ORAL at 10:16

## 2021-08-20 RX ADMIN — LOSARTAN POTASSIUM 25 MG: 25 TABLET, FILM COATED ORAL at 10:18

## 2021-08-20 RX ADMIN — CYANOCOBALAMIN TAB 500 MCG 1000 MCG: 500 TAB at 10:15

## 2021-08-20 RX ADMIN — METOPROLOL TARTRATE 25 MG: 25 TABLET, FILM COATED ORAL at 10:15

## 2021-08-20 NOTE — NURSING NOTE
Pt oneil clamped, iv removed, instruction on how to take care of oneil given to pt and wife  Also side effects and instructions for taking  discharge medications provided to both pt and wife

## 2021-08-20 NOTE — TELEPHONE ENCOUNTER
Called and spoke with patient and provided clarification on his 2 appointments scheduled for Monday  Patient aware that appt in am is to have Yarbrough catheter removed and afternoon appointment is to scan his bladder to ensure he is emptying well without the catheter  Patient verbalized understanding and confirmed appointments

## 2021-08-20 NOTE — DISCHARGE SUMMARY
DISCHARGE SUMMARY    Patient Name: Nuzhat Rea  Patient MRN: [id number]  Admitting Provider: Marta Alfred MD  Discharging Provider: Marta Alfred MD  Primary Care Physician at Discharge: King Gomez [phone]   Admission Date: 8/19/2021       Discharge Date: 08/20/21      Admission Diagnosis   Urinary retention due to benign prostatic hyperplasia [N40 1, R33 8]    Discharge Diagnoses  Patient Active Problem List   Diagnosis    ED (erectile dysfunction)    Screening for colon cancer    Essential hypertension    [place] myeloma ([place] [address] )    Recurrent major depressive disorder, in full remission ([place] [address] )    Type 2 diabetes mellitus with kidney complication, without long-term current use of insulin ([place] [address] )    Neuropathy    Hyperlipidemia    Oropharyngeal dysphagia    Urinary retention due to benign prostatic hyperplasia    Encounter to discuss test results    Thrombocytopenia ([place] [address] )    Other ejaculatory dysfunction    Stage 3a chronic kidney disease ([place] [address] )    CAD (coronary artery disease)    Microcytic anemia    Serum total bilirubin elevated    Pneumonia of left lower lobe due to infectious organism         Hospital Course  Patient known to group for BPH/ with LUTs and urinary obstruction  Unfortunately, patient failed medical management  After explanation of Risks vs  Benefits and potential complications; patient was agreeable and furnished informed consent for transurethral resection of the prostate  Refer to operative report for details  There were no complications  Patient was admitted for overnight observation and continuous bladder irrigation  Patient remained afebrile, HD stable and pain controlled  Breakfast was tolerated  CBI was clamped  Urine was clear to mild without clots  Patient therefore deemed  stable for discharge  Office will contact patient with date & time for oneil catheter removal within -3 days      Medications  Current Discharge Medication List      CONTINUE these medications which have NOT CHANGED    Details   acyclovir (ZOVIRAX) 400 MG tablet Take 400 mg by mouth daily  Refills: 5      aspirin (ECOTRIN LOW STRENGTH) 81 mg EC tablet Take 81 mg by mouth daily      buPROPion (WELLBUTRIN SR) 100 mg 12 hr tablet Take 3 tablets daily in the morning      cholecalciferol (VITAMIN D3) 1,000 units tablet Take 1,000 Units by mouth daily      colesevelam (WELCHOL) 625 mg tablet Take 1,250 mg by mouth 2 (two) times a day      cyanocobalamin (VITAMIN B-12) 1,000 mcg tablet Take by mouth daily      DULoxetine (CYMBALTA) 60 mg delayed release capsule Take 60 mg by mouth daily      ELOTUZUMAB IV Infuse into a venous catheter every 30 (thirty) days      glimepiride (AMARYL) 1 mg tablet Take 1 mg by mouth every morning before breakfast      JANUMET XR  MG TB24 Take 2 tablets by mouth every evening  Refills: 2      lenalidomide (REVLIMID) 5 MG CAPS Take 5 mg by mouth daily      losartan (COZAAR) 25 mg tablet Take 25 mg by mouth daily  Refills: 1      metoprolol tartrate (LOPRESSOR) 25 mg tablet Take 25 mg by mouth every 12 (twelve) hours      omeprazole (PriLOSEC) 20 mg delayed release capsule Take 20 mg by mouth daily      rosuvastatin (CRESTOR) 5 mg tablet Take 5 mg by mouth daily      Vascepa 1 g CAPS Take 2 capsules by mouth 2 (two) times a day      Xarelto 2 5 MG tablet Take 2 5 mg by mouth 2 (two) times a day      ALPRAZolam (XANAX) 1 mg tablet 1 mg 2 (two) times a day as needed  Refills: 0      nitroglycerin (NITROSTAT) 0 4 mg SL tablet PLACE 1 TABLET UNDER TONGUE EVERY 5 MINS 3 TIMES IF NEEDED FOR CHEST PAIN  Refills: 0      traZODone (DESYREL) 50 mg tablet Take 50 mg by mouth as needed            Current Discharge Medication List      START taking these medications    Details   docusate sodium (COLACE) 100 mg capsule Take 1 capsule (100 mg total) by mouth 3 (three) times a day for 7 days  Qty: 90 capsule, Refills: 0    Associated Diagnoses: Urinary retention due to benign prostatic hyperplasia      oxyCODONE (ROXICODONE) 5 mg immediate release tablet Take 1 tablet (5 mg total) by mouth every 4 (four) hours as needed for moderate pain for up to 5 daysMax Daily Amount: 30 mg  Qty: 6 tablet, Refills: 0    Associated Diagnoses: Urinary retention due to benign prostatic hyperplasia      sulfamethoxazole-trimethoprim (BACTRIM DS) 800-160 mg per tablet Take 1 tablet by mouth every 12 (twelve) hours for 3 days Start the day prior to catheter removal  Qty: 6 tablet, Refills: 0    Associated Diagnoses: Urinary retention due to benign prostatic hyperplasia               Allergies  Allergies   Allergen Reactions    Iodinated Diagnostic Agents Anaphylaxis    Other Anaphylaxis    Trilipix [Choline Fenofibrate]      Other reaction(s): jaundice  Other reaction(s): jaundice    Atorvastatin     Shellfish Allergy - Food Allergy        Outpatient Follow-Up  Future Appointments   Date Time Provider Eufemia Torres   8/23/2021  9:00 AM UROLOGY NURSE [facility]   8/23/2021  2:00 PM UROLOGY NURSE [facility]   9/17/2021 11:30 AM Augusto Lindsey PA-C [facility]       Active Issues Requiring Follow-Up  oneil catheter removal    Test Results Pending at Discharge        Discharge Disposition  Home     Treatments   Oneil catheter     Consults   none    Procedures   CBI--clamped prior to discharge    Operative Procedures Performed  Procedure(s):  TRANSURETHRAL VAPORIZATION OF PROSTATE (TURP)    Pertinent Test Results   Lab Results   Component Value Date    WBC 4 51 08/19/2021    HGB 9 7 (L) 08/19/2021    HCT 30 6 (L) 08/19/2021    MCV 74 (L) 08/19/2021    PLT 78 (L) 08/19/2021     Lab Results   Component Value Date    SODIUM 136 08/02/2021    K 4 2 08/02/2021     08/02/2021    CO2 27 08/02/2021    BUN 18 08/02/2021    CREATININE 1 26 08/02/2021    GLUC 182 (H) 06/02/2021    CALCIUM 8 2 (L) 08/02/2021       Pathology Report Pending    Physical Exam at Discharge  Condition of Patient on Discharge: good  /71   Pulse 87   Temp 98 2 °F (36 8 °C)   Resp 18   Ht 5' 9" (1 753 m)   Wt 90 kg (198 lb 6 6 oz)   SpO2 96%   BMI 29 30 kg/m²   General appearance: alert and oriented, in no acute distress, appears stated age, cooperative and no distress  Head: Normocephalic, without obvious abnormality, atraumatic  Neck: no adenopathy, no carotid bruit, no JVD, supple, symmetrical, trachea midline and thyroid not enlarged, symmetric, no tenderness/mass/nodules  Lungs: clear to auscultation bilaterally  Heart: regular rate and rhythm, S1, S2 normal, no murmur, click, rub or gallop  Abdomen: soft, non-tender; bowel sounds normal; no masses,  no organomegaly  Extremities: extremities normal, warm and well-perfused; no cyanosis, clubbing, or edema  Pulses: 2+ and symmetric  Neurologic: Grossly normal  [place]--clear yellow urine  I/O last 3 completed shifts: In: 2007 [P O :240; I V :1000;  Blood:214]  Out: 6600 [VZLVR:6179; Blood:5]  I/O this shift:  In: 240 [P O :240]  Out: -                 KEKE Sanchez

## 2021-08-20 NOTE — TELEPHONE ENCOUNTER
----- Message from Amie Powell sent at 8/19/2021  6:27 PM EDT -----  Regarding: Pre-Op/Post-Op Question  Contact: [phone]  There are 2 appointments scheduled for Monday the 23rd  One for 9:00 one for 2:00  I would prefer 2:00  Please confirm this information   Thanks

## 2021-08-23 ENCOUNTER — PROCEDURE VISIT (OUTPATIENT)
Dept: UROLOGY | Facility: CLINIC | Age: 66
End: 2021-08-23
Payer: MEDICARE

## 2021-08-23 ENCOUNTER — NURSE TRIAGE (OUTPATIENT)
Dept: OTHER | Facility: OTHER | Age: 66
End: 2021-08-23

## 2021-08-23 VITALS
BODY MASS INDEX: 29.92 KG/M2 | DIASTOLIC BLOOD PRESSURE: 74 MMHG | HEART RATE: 76 BPM | WEIGHT: 202 LBS | HEIGHT: 69 IN | SYSTOLIC BLOOD PRESSURE: 138 MMHG

## 2021-08-23 DIAGNOSIS — N40.0 BENIGN PROSTATIC HYPERPLASIA, UNSPECIFIED WHETHER LOWER URINARY TRACT SYMPTOMS PRESENT: Primary | ICD-10-CM

## 2021-08-23 LAB — POST-VOID RESIDUAL VOLUME, ML POC: 24 ML

## 2021-08-23 PROCEDURE — 51798 US URINE CAPACITY MEASURE: CPT

## 2021-08-23 NOTE — PROGRESS NOTES
8/23/2021    Kemi Frey  1955  [id number]    Diagnosis  Chief Complaint     Benign prostatic hyperplasia, unspecified whether lower urin          Patient presents for oneil catheter removal  managed by Dr Sulma Willson is to follow up as scheduled this afternoon with Rn for post void residual      Procedure Oneil removal/voiding trial    Oneil catheter removed after deflation of an intact balloon  Patient tolerated well  Encouraged patient to hydrate well and return this afternoon for post void residual   he knows he may return early if uncomfortable and unable to urinate  Patient agrees to this plan  Patient returned this afternoon  Patient states able to void  Patient voided in the office waiting room prior to second appointment  Bladder ultrasound performed and PVR measured 24ml      Recent Results (from the past 4 hour(s))   POCT Measure PVR    Collection Time: 08/23/21  1:51 PM   Result Value Ref Range    POST-VOID RESIDUAL VOLUME, ML POC 24 mL           Vitals:    08/23/21 0903   BP: 138/74   BP Location: Right arm   Patient Position: Sitting   Cuff Size: Standard   Pulse: 76   Weight: 91 6 kg (202 lb)   Height: 5' 9" (1 753 m)           Juan A Cline RN

## 2021-08-23 NOTE — TELEPHONE ENCOUNTER
Reason for Disposition   [1] Caller has NON-URGENT medicine question about med that PCP prescribed AND [2] triager unable to answer question    Answer Assessment - Initial Assessment Questions  1  NAME of MEDICATION: "What medicine are you calling about?"    revlimid   Aspirin   xarelto  Vitamin d   vascepa   2   QUESTION: "What is your question?" (e g , medication refill, side effect)      Wants to know when he should resume taking these medications   Had TURP done on 8/19    Protocols used: MEDICATION QUESTION CALL-ADULT-

## 2021-08-23 NOTE — TELEPHONE ENCOUNTER
Regarding: Blood thinner question  ----- Message from [facility] sent at 8/23/2021  3:54 PM EDT -----  "I had a procedure today and I meant to ask the doctor if I can start my blood thinner again "

## 2021-08-25 ENCOUNTER — APPOINTMENT (OUTPATIENT)
Dept: LAB | Facility: CLINIC | Age: 66
End: 2021-08-25
Payer: MEDICARE

## 2021-08-25 DIAGNOSIS — E11.649 UNCONTROLLED TYPE 2 DIABETES MELLITUS WITH HYPOGLYCEMIA WITHOUT COMA (HCC): ICD-10-CM

## 2021-08-25 DIAGNOSIS — N18.31 STAGE 3A CHRONIC KIDNEY DISEASE (HCC): ICD-10-CM

## 2021-08-25 LAB
ALBUMIN SERPL BCP-MCNC: 3.6 G/DL (ref 3.5–5)
ALP SERPL-CCNC: 128 U/L (ref 46–116)
ALT SERPL W P-5'-P-CCNC: 30 U/L (ref 12–78)
ANION GAP SERPL CALCULATED.3IONS-SCNC: 5 MMOL/L (ref 4–13)
AST SERPL W P-5'-P-CCNC: 16 U/L (ref 5–45)
BILIRUB SERPL-MCNC: 0.81 MG/DL (ref 0.2–1)
BUN SERPL-MCNC: 19 MG/DL (ref 5–25)
CALCIUM SERPL-MCNC: 9 MG/DL (ref 8.3–10.1)
CHLORIDE SERPL-SCNC: 109 MMOL/L (ref 100–108)
CHOLEST SERPL-MCNC: 100 MG/DL (ref 50–200)
CO2 SERPL-SCNC: 24 MMOL/L (ref 21–32)
CREAT SERPL-MCNC: 1.33 MG/DL (ref 0.6–1.3)
GFR SERPL CREATININE-BSD FRML MDRD: 56 ML/MIN/1.73SQ M
GLUCOSE P FAST SERPL-MCNC: 141 MG/DL (ref 65–99)
HDLC SERPL-MCNC: 32 MG/DL
LDLC SERPL CALC-MCNC: 35 MG/DL (ref 0–100)
NONHDLC SERPL-MCNC: 68 MG/DL
POTASSIUM SERPL-SCNC: 4.9 MMOL/L (ref 3.5–5.3)
PROT SERPL-MCNC: 6.9 G/DL (ref 6.4–8.2)
SODIUM SERPL-SCNC: 138 MMOL/L (ref 136–145)
TRIGL SERPL-MCNC: 164 MG/DL

## 2021-08-25 PROCEDURE — 80053 COMPREHEN METABOLIC PANEL: CPT

## 2021-08-25 PROCEDURE — 36415 COLL VENOUS BLD VENIPUNCTURE: CPT

## 2021-08-25 PROCEDURE — 80061 LIPID PANEL: CPT

## 2021-08-26 ENCOUNTER — TELEPHONE (OUTPATIENT)
Dept: OTHER | Facility: OTHER | Age: 66
End: 2021-08-26

## 2021-08-26 ENCOUNTER — TELEPHONE (OUTPATIENT)
Dept: UROLOGY | Facility: AMBULATORY SURGERY CENTER | Age: 66
End: 2021-08-26

## 2021-08-26 NOTE — TELEPHONE ENCOUNTER
Regarding: Test Results Question  Contact: [phone]  ----- Message from Lakeisha Mishra sent at 8/26/2021  9:18 AM EDT -----       ----- Message from Farideh Charles to Nereyda Arias PA-C sent at 8/26/2021  9:11 AM -----   Good morning,    I am concerned about the urine results I took for Dr Lorin Ca  Could they be a result from the surgery? Please let me know if I should be worried       Thanks     Jayleen Gonzalez

## 2021-08-26 NOTE — TELEPHONE ENCOUNTER
Last urine test was culture performed on 8/2/2021 which was negative for infection  Microalbumin/creatinine was done yesterday which noted to have elevated findings  This is not related to recent prostate procedure  Patient has history of DM2

## 2021-08-26 NOTE — TELEPHONE ENCOUNTER
Please give me a call to discuss my 's latest test results  Some of the numbers seemed a little high

## 2021-08-27 NOTE — TELEPHONE ENCOUNTER
Sheila Man 20 hours ago (12:15 PM)   SK     Please give me a call to discuss my 's latest test results  Some of the numbers seemed a little high           Documentation

## 2021-08-30 NOTE — TELEPHONE ENCOUNTER
Called and spoke to patient  Edda Lobo reports his endocrinologist reached out to him to discuss his micoralbumin/creat ratio  Edda Loob further states he voided a clot a few days ago but has been well since  Advised this is not uncommon and it may happen again  As long as he is able to urinate and urine clears with hydration  Stressed adequate hydration and er precautions to patient

## 2021-09-16 NOTE — PROGRESS NOTES
9/17/2021      Chief Complaint   Patient presents with    Follow-up       Assessment and Plan    1  BPH   - S/p transurethral vaporization of the prostate with Dr Juan Valdez on 8/19/21   - He is doing well  AUA currently 3  Was 16 prior to surgery  - Demonstrating adequate bladder emptying today with PVR of 12 mL     2  ED  - Previously failed Viagra  - He would like to start Trimix injections  Medication ordered today  Will return for Trimix teaching      - Return for Trimix teaching and then in 3-4 months for symptom reassessment, PVR, and Uroflow  History of Present Illness  Kemi Frey is a 61 y o  male here for follow up evaluation of BPH and erectile dysfunction  He is currently status post transurethral vaporization of the prostate on 08/19/2021  He was instructed to taper off the Flomax following this procedure  He is doing very well postoperatively and notes significant improvement in his symptoms  Does note occasional dysuria  Denies any hematuria  Overall he is satisfied with urination at this time  He also has erectile dysfunction  He has previously had little response with Viagra  Previously was advised on alternative therapies including intracavernosal injections and IPP  He does have complaints of premature ejaculation as well  He is interested in intracavernosal injections  He has history of multiple myeloma with ongoing therapy with weekly infusions  AUA SYMPTOM SCORE      Most Recent Value   AUA SYMPTOM SCORE   How often have you had a sensation of not emptying your bladder completely after you finished urinating? 0   How often have you had to urinate again less than two hours after you finished urinating? 1   How often have you found you stopped and started again several times when you urinate?  0   How often have you found it difficult to postpone urination?   0   How often have you had a weak urinary stream?  0   How often have you had to push or strain to begin urination? 0   How many times did you most typically get up to urinate from the time you went to bed at night until the time you got up in the morning? 2   Quality of Life: If you were to spend the rest of your life with your urinary condition just the way it is now, how would you feel about that?  0   AUA SYMPTOM SCORE  3          Review of Systems   Constitutional: Negative for chills and fever  Respiratory: Negative for shortness of breath  Cardiovascular: Negative for chest pain  Gastrointestinal: Negative for abdominal pain  Genitourinary: Positive for dysuria  Negative for difficulty urinating, flank pain, frequency, hematuria and urgency  Neurological: Negative for dizziness  Past Medical History  Past Medical History:   Diagnosis Date    Back problem     Diabetes [facility] [address] [facility]     Diabetes mellitus ([facility]     Heartburn     History of depression     History of transfusion     Hyperlipidemia     Hypertension     Multiple myeloma (HCC)     [facility] infarction ([facility] )     Pneumonia        Past Social History  Past Surgical History:   Procedure Laterality Date    CARDIAC CATHETERIZATION      HERNIA REPAIR      AL COLONOSCOPY FLX DX W/COLLJ SPEC WHEN PFRMD N/A 11/16/2018    Procedure: COLONOSCOPY;  Surgeon: Cristino Montanez MD;  Location: [facility]; Service: Gastroenterology    AL TRANSURETHRAL ELEC-SURG PROSTATECTOM N/A 8/19/2021    Procedure: TRANSURETHRAL VAPORIZATION OF PROSTATE (TURP);   Surgeon: Bucky Cárdenas MD;  Location: [facility];  Service: Urology    TONSILLECTOMY      TONSILLECTOMY       Social History     Tobacco Use   Smoking Status Never Smoker   Smokeless Tobacco Never Used       Past Family History  Family History   Problem Relation Age of Onset    Heart disease Father     Diabetes Brother        Past Social history  Social History     Socioeconomic History    Marital status: /Civil Union     Spouse name: Not on file    Number of children: Not on file    Years of education: Not on file    Highest education level: Not on file   Occupational History    Not on file   Tobacco Use    Smoking status: Never Smoker    Smokeless tobacco: Never Used   Vaping Use    Vaping Use: Never used   Substance and Sexual Activity    Alcohol use: Yes     Comment: socially     Drug use: No    Sexual activity: Yes   Other Topics Concern    Not on file   Social History Narrative    Not on file     Social Determinants of Health     Financial Resource Strain:     Difficulty of Paying Living Expenses:    Food Insecurity:     Worried About Running Out of Food in the Last Year:     [address] in the Last Year:    Transportation Needs:     Lack of Transportation (Medical):      Lack of Transportation (Non-Medical):    Physical Activity: Inactive    Days of Exercise per Week: 0 days    Minutes of Exercise per Session: 0 min   Stress: No Stress Concern Present    Feeling of Stress : Not at all   Social Connections:     Frequency of Communication with Friends and Family:     Frequency of Social Gatherings with Friends and Family:     Attends Bahai Services:     Active Member of Clubs or Organizations:     Attends Club or Organization Meetings:     Marital Status:    Intimate Partner Violence:     Fear of Current or Ex-Partner:     Emotionally Abused:     Physically Abused:     Sexually Abused:        Current Medications  Current Outpatient Medications   Medication Sig Dispense Refill    acyclovir (ZOVIRAX) 400 MG tablet Take 400 mg by mouth daily  5    ALPRAZolam (XANAX) 1 mg tablet 1 mg 2 (two) times a day as needed  0    aspirin (ECOTRIN LOW STRENGTH) 81 mg EC tablet Take 81 mg by mouth daily      buPROPion (WELLBUTRIN SR) 100 mg 12 hr tablet Take 3 tablets daily in the morning      cholecalciferol (VITAMIN D3) 1,000 units tablet Take 1,000 Units by mouth daily      colesevelam (WELCHOL) 625 mg tablet Take 1,250 mg by mouth 2 (two) times a day as needed       cyanocobalamin (VITAMIN B-12) 1,000 mcg tablet Take by mouth daily      DULoxetine (CYMBALTA) 60 mg delayed release capsule Take 60 mg by mouth daily      ELOTUZUMAB IV Infuse into a venous catheter every 30 (thirty) days      glimepiride (AMARYL) 1 mg tablet Take 1 mg by mouth every morning before breakfast      Janumet  MG per tablet Take 1 tablet by mouth 2 (two) times a day with meals      lenalidomide (REVLIMID) 5 MG CAPS Take 5 mg by mouth daily      losartan (COZAAR) 25 mg tablet Take 25 mg by mouth daily  1    metoprolol tartrate (LOPRESSOR) 25 mg tablet Take 25 mg by mouth every 12 (twelve) hours      nitroglycerin (NITROSTAT) 0 4 mg SL tablet PLACE 1 TABLET UNDER TONGUE EVERY 5 MINS 3 TIMES IF NEEDED FOR CHEST PAIN  0    omeprazole (PriLOSEC) 20 mg delayed release capsule Take 20 mg by mouth daily      rosuvastatin (CRESTOR) 5 mg tablet Take 5 mg by mouth daily      traZODone (DESYREL) 50 mg tablet Take 50 mg by mouth as needed       Vascepa 1 g CAPS Take 2 capsules by mouth 2 (two) times a day      Xarelto 2 5 MG tablet Take 2 5 mg by mouth 2 (two) times a day      docusate sodium (COLACE) 100 mg capsule Take 1 capsule (100 mg total) by mouth 3 (three) times a day for 7 days 90 capsule 0    JANUMET XR  MG TB24 Take 2 tablets by mouth every evening (Patient not taking: Reported on 9/17/2021)  2     No current facility-administered medications for this visit         Allergies  Allergies   Allergen Reactions    Iodinated Diagnostic Agents Anaphylaxis    Other Anaphylaxis    Trilipix [Choline Fenofibrate]      Other reaction(s): jaundice  Other reaction(s): jaundice    Atorvastatin          The following portions of the patient's history were reviewed and updated as appropriate: allergies, current medications, past medical history, past social history, past surgical history and problem list       Vitals  Vitals:    09/17/21 1125   BP: 120/78   Pulse: 74 Weight: 90 kg (198 lb 6 4 oz)   Height: 5' 9" (1 753 m)           Physical Exam  Physical Exam  Constitutional:       Appearance: Normal appearance  HENT:      Head: Normocephalic and atraumatic  Right Ear: External ear normal       Left Ear: External ear normal    Eyes:      General: No scleral icterus  Conjunctiva/sclera: Conjunctivae normal    Cardiovascular:      Pulses: Normal pulses  Pulmonary:      Effort: Pulmonary effort is normal    Musculoskeletal:         General: Normal range of motion  Cervical back: Normal range of motion  Neurological:      General: No focal deficit present  Mental Status: He is alert and oriented to person, place, and time  Psychiatric:         Mood and Affect: Mood normal          Behavior: Behavior normal          Thought Content:  Thought content normal          Judgment: Judgment normal            Results  Recent Results (from the past 1 hour(s))   POCT Measure PVR    Collection Time: 09/17/21 11:31 AM   Result Value Ref Range    POST-VOID RESIDUAL VOLUME, ML POC 12 mL   ]  Lab Results   Component Value Date    PSA 0 4 01/14/2021    PSA 0 4 11/12/2019     Lab Results   Component Value Date    CALCIUM 9 0 08/25/2021    K 4 9 08/25/2021    CO2 24 08/25/2021     (H) 08/25/2021    BUN 19 08/25/2021    CREATININE 1 33 (H) 08/25/2021     Lab Results   Component Value Date    WBC 4 51 08/19/2021    HGB 9 7 (L) 08/19/2021    HCT 30 6 (L) 08/19/2021    MCV 74 (L) 08/19/2021    PLT 78 (L) 08/19/2021           Orders  Orders Placed This Encounter   Procedures    POCT Measure PVR       Alli Pepper PA-C

## 2021-09-17 ENCOUNTER — OFFICE VISIT (OUTPATIENT)
Dept: UROLOGY | Facility: CLINIC | Age: 66
End: 2021-09-17
Payer: MEDICARE

## 2021-09-17 VITALS
HEIGHT: 69 IN | WEIGHT: 198.4 LBS | DIASTOLIC BLOOD PRESSURE: 78 MMHG | SYSTOLIC BLOOD PRESSURE: 120 MMHG | BODY MASS INDEX: 29.38 KG/M2 | HEART RATE: 74 BPM

## 2021-09-17 DIAGNOSIS — N40.1 URINARY RETENTION DUE TO BENIGN PROSTATIC HYPERPLASIA: Primary | ICD-10-CM

## 2021-09-17 DIAGNOSIS — R33.8 URINARY RETENTION DUE TO BENIGN PROSTATIC HYPERPLASIA: Primary | ICD-10-CM

## 2021-09-17 LAB — POST-VOID RESIDUAL VOLUME, ML POC: 12 ML

## 2021-09-17 PROCEDURE — 99213 OFFICE O/P EST LOW 20 MIN: CPT | Performed by: PHYSICIAN ASSISTANT

## 2021-09-17 PROCEDURE — 51798 US URINE CAPACITY MEASURE: CPT | Performed by: PHYSICIAN ASSISTANT

## 2021-09-17 RX ORDER — SITAGLIPTIN AND METFORMIN HYDROCHLORIDE 1000; 50 MG/1; MG/1
1 TABLET, FILM COATED ORAL 2 TIMES DAILY WITH MEALS
COMMUNITY
Start: 2021-09-10

## 2021-10-20 ENCOUNTER — OFFICE VISIT (OUTPATIENT)
Dept: UROLOGY | Facility: CLINIC | Age: 66
End: 2021-10-20
Payer: MEDICARE

## 2021-10-20 VITALS
HEART RATE: 74 BPM | DIASTOLIC BLOOD PRESSURE: 82 MMHG | HEIGHT: 69 IN | WEIGHT: 203 LBS | BODY MASS INDEX: 30.07 KG/M2 | SYSTOLIC BLOOD PRESSURE: 126 MMHG

## 2021-10-20 DIAGNOSIS — N52.9 ERECTILE DYSFUNCTION, UNSPECIFIED ERECTILE DYSFUNCTION TYPE: Primary | ICD-10-CM

## 2021-10-20 PROCEDURE — 99213 OFFICE O/P EST LOW 20 MIN: CPT | Performed by: PHYSICIAN ASSISTANT

## 2021-11-08 ENCOUNTER — APPOINTMENT (OUTPATIENT)
Dept: LAB | Facility: CLINIC | Age: 66
End: 2021-11-08
Payer: MEDICARE

## 2021-11-08 DIAGNOSIS — N17.8 ACUTE RENAL FAILURE WITH PATHOLOGICAL LESION IN KIDNEY (HCC): ICD-10-CM

## 2021-11-08 DIAGNOSIS — N18.30 STAGE 3 CHRONIC KIDNEY DISEASE, UNSPECIFIED WHETHER STAGE 3A OR 3B CKD (HCC): ICD-10-CM

## 2021-11-08 DIAGNOSIS — E11.22 TYPE 2 DIABETES MELLITUS WITH ESRD (END-STAGE RENAL DISEASE) (HCC): ICD-10-CM

## 2021-11-08 DIAGNOSIS — E11.65 UNCONTROLLED TYPE 2 DIABETES MELLITUS WITH HYPERGLYCEMIA (HCC): ICD-10-CM

## 2021-11-08 DIAGNOSIS — N18.6 TYPE 2 DIABETES MELLITUS WITH ESRD (END-STAGE RENAL DISEASE) (HCC): ICD-10-CM

## 2021-11-08 LAB
ALBUMIN SERPL BCP-MCNC: 3.6 G/DL (ref 3.5–5)
ALP SERPL-CCNC: 130 U/L (ref 46–116)
ALT SERPL W P-5'-P-CCNC: 47 U/L (ref 12–78)
ANION GAP SERPL CALCULATED.3IONS-SCNC: 5 MMOL/L (ref 4–13)
AST SERPL W P-5'-P-CCNC: 21 U/L (ref 5–45)
BILIRUB SERPL-MCNC: 1.05 MG/DL (ref 0.2–1)
BUN SERPL-MCNC: 21 MG/DL (ref 5–25)
CALCIUM SERPL-MCNC: 9.3 MG/DL (ref 8.3–10.1)
CHLORIDE SERPL-SCNC: 108 MMOL/L (ref 100–108)
CO2 SERPL-SCNC: 24 MMOL/L (ref 21–32)
CREAT SERPL-MCNC: 1.43 MG/DL (ref 0.6–1.3)
EST. AVERAGE GLUCOSE BLD GHB EST-MCNC: 123 MG/DL
GFR SERPL CREATININE-BSD FRML MDRD: 51 ML/MIN/1.73SQ M
GLUCOSE P FAST SERPL-MCNC: 161 MG/DL (ref 65–99)
HBA1C MFR BLD: 5.9 %
MAGNESIUM SERPL-MCNC: 1.5 MG/DL (ref 1.6–2.6)
PHOSPHATE SERPL-MCNC: 3.4 MG/DL (ref 2.3–4.1)
POTASSIUM SERPL-SCNC: 4.8 MMOL/L (ref 3.5–5.3)
PROT SERPL-MCNC: 6.6 G/DL (ref 6.4–8.2)
PTH-INTACT SERPL-MCNC: 28.1 PG/ML (ref 18.4–80.1)
SODIUM SERPL-SCNC: 137 MMOL/L (ref 136–145)

## 2021-11-08 PROCEDURE — 83970 ASSAY OF PARATHORMONE: CPT

## 2021-11-08 PROCEDURE — 36415 COLL VENOUS BLD VENIPUNCTURE: CPT

## 2021-11-08 PROCEDURE — 83735 ASSAY OF MAGNESIUM: CPT

## 2021-11-08 PROCEDURE — 83036 HEMOGLOBIN GLYCOSYLATED A1C: CPT

## 2021-11-08 PROCEDURE — 80053 COMPREHEN METABOLIC PANEL: CPT

## 2021-11-08 PROCEDURE — 84100 ASSAY OF PHOSPHORUS: CPT

## 2021-12-06 ENCOUNTER — APPOINTMENT (OUTPATIENT)
Dept: LAB | Facility: CLINIC | Age: 66
End: 2021-12-06
Payer: MEDICARE

## 2021-12-06 DIAGNOSIS — N18.30 STAGE 3 CHRONIC KIDNEY DISEASE, UNSPECIFIED WHETHER STAGE 3A OR 3B CKD (HCC): ICD-10-CM

## 2021-12-06 LAB
ALBUMIN SERPL BCP-MCNC: 3.7 G/DL (ref 3.5–5)
ANION GAP SERPL CALCULATED.3IONS-SCNC: 5 MMOL/L (ref 4–13)
BUN SERPL-MCNC: 17 MG/DL (ref 5–25)
CALCIUM SERPL-MCNC: 9.5 MG/DL (ref 8.3–10.1)
CHLORIDE SERPL-SCNC: 107 MMOL/L (ref 100–108)
CO2 SERPL-SCNC: 26 MMOL/L (ref 21–32)
CREAT SERPL-MCNC: 1.45 MG/DL (ref 0.6–1.3)
GFR SERPL CREATININE-BSD FRML MDRD: 51 ML/MIN/1.73SQ M
GLUCOSE P FAST SERPL-MCNC: 159 MG/DL (ref 65–99)
PHOSPHATE SERPL-MCNC: 3.5 MG/DL (ref 2.3–4.1)
POTASSIUM SERPL-SCNC: 4.9 MMOL/L (ref 3.5–5.3)
SODIUM SERPL-SCNC: 138 MMOL/L (ref 136–145)

## 2021-12-06 PROCEDURE — 36415 COLL VENOUS BLD VENIPUNCTURE: CPT

## 2021-12-06 PROCEDURE — 80069 RENAL FUNCTION PANEL: CPT

## 2022-01-12 NOTE — CASE MANAGEMENT
Pt is OP   S/p TURP  D/c today with oneil  Met with pt and wife to discuss need for possible VN f/u for oneil teach  They reported that they will be seeing urologist Monday for Oneil removals  Discussed need to change to leg bag during day and then back to larger bag at night-to gravity  Wife and pt agree that if primary nurse instructs them on same they are comfortable taking care of same  RN, Sonya Canada notified of same     No other d/c needs evaluated Spacer for rescue inhaler    Stop Advair and start Trelegy 200 1 puff once a day every day, rinse mouth after using due to risk for thrush if mouth or tongue has white sores contact clinic    Let me know which medication you prefer to get refills.     Take prednisone 10 mg 1 pill for 3-5 days for chest tightness when nebulizer does not fix

## 2022-01-18 NOTE — PROGRESS NOTES
1/21/2022      Chief Complaint   Patient presents with    Urinary Retention     struggled to void during uroflow still has the urgency to void      Assessment and Plan    1  BPH   2  Incomplete bladder emptying  3  Difficulty urinating  - S/p transurethral vaporization of prostate with Dr Andi Burroughs on 8/19/21  - AUA currently 6, AUA 16 prior to surgery  - HZH=951 mL  Uroflow unable to register    - Will start Alfuzosin and schedule for cystoscopy for evaluate for any recurrent MUNOZ  No candidate for finasteride given ED issues  4  ED  - No response with Trimix standard despite injecting full syringe  Will increase to Quadmix standard, rx faxed to [place]  Advised to titrate up as previously instructed  5  Prostate cancer screening  - PSA from 1/14/21 was 0 4  - TIFFANY unremarkable  - Obtain updated PSA    - F/u for cystoscopy  History of Present Illness  Lisandro Garcia is a 61 y o  male here for follow up evaluation of  BPH and erectile dysfunction  Patient is status post transurethral vaporization of the prostate in August of 2021  Initially he was doing well however recently over the past month he has been having difficulties with urinating and weak urinary stream   He also has urinary frequency and urgency  He denies any dysuria or hematuria  Denies any incontinence  Patient is currently managed on intracavernosal injections for his erectile dysfunction  Previously failed oral PDE5 inhibitors  He reports TriMix was ineffective despite injecting max dose of medication  Denies family history of  malignancy    Review of Systems   Constitutional: Negative for chills and fever  Respiratory: Negative for shortness of breath  Cardiovascular: Negative for chest pain  Gastrointestinal: Negative for abdominal pain  Genitourinary: Positive for difficulty urinating, frequency and urgency  Negative for dysuria, flank pain and hematuria  Neurological: Negative for dizziness             AUA SYMPTOM SCORE      Most Recent Value   AUA SYMPTOM SCORE    How often have you had a sensation of not emptying your bladder completely after you finished urinating? 1 (P)     How often have you had to urinate again less than two hours after you finished urinating? 1 (P)     How often have you found you stopped and started again several times when you urinate? 1 (P)     How often have you found it difficult to postpone urination? 0 (P)     How often have you had a weak urinary stream? 1 (P)     How often have you had to push or strain to begin urination? 1 (P)     How many times did you most typically get up to urinate from the time you went to bed at night until the time you got up in the morning? 1 (P)     Quality of Life: If you were to spend the rest of your life with your urinary condition just the way it is now, how would you feel about that? 3 (P)     AUA SYMPTOM SCORE 6 (P)              Past Medical History  Past Medical History:   Diagnosis Date    Back problem     Diabetes [facility]     Diabetes mellitus [facility]     Heartburn     History of depression     History of transfusion     Hyperlipidemia     Hypertension     Multiple myeloma ([facility]     Myocardial infarction ([facility]     Pneumonia        Past Social History  Past Surgical History:   Procedure Laterality Date    CARDIAC CATHETERIZATION      HERNIA REPAIR      DC COLONOSCOPY FLX DX W/COLLJ SPEC WHEN PFRMD N/A 11/16/2018    Procedure: COLONOSCOPY;  Surgeon: Cammie oTrres MD;  Location: [facility]; Service: Gastroenterology    DC TRANSURETHRAL ELEC-SURG PROSTATECTOM N/A 8/19/2021    Procedure: TRANSURETHRAL VAPORIZATION OF PROSTATE (TURP);   Surgeon: Vernon Goltz, MD;  Location: [facility];  Service: Urology    TONSILLECTOMY      TONSILLECTOMY       Social History     Tobacco Use   Smoking Status Never Smoker   Smokeless Tobacco Never Used       Past Family History  Family History   Problem Relation Age of Onset    Heart disease Father  Diabetes Brother        Past Social history  Social History     Socioeconomic History    Marital status: /Civil Union     Spouse name: Not on file    Number of children: Not on file    Years of education: Not on file    Highest education level: Not on file   Occupational History    Not on file   Tobacco Use    Smoking status: Never Smoker    Smokeless tobacco: Never Used   Vaping Use    Vaping Use: Never used   Substance and Sexual Activity    Alcohol use: Yes     Comment: socially     Drug use: No    Sexual activity: Yes   Other Topics Concern    Not on file   Social History Narrative    Not on file     Social Determinants of Health     Financial Resource Strain: Not on file   Food Insecurity: Not on file   Transportation Needs: Not on file   Physical Activity: Inactive    Days of Exercise per Week: 0 days    Minutes of Exercise per Session: 0 min   Stress: No Stress Concern Present    Feeling of Stress : Not at all   Social Connections: Not on file   Intimate Partner Violence: Not on file   Housing Stability: Not on file       Current Medications  Current Outpatient Medications   Medication Sig Dispense Refill    acyclovir (ZOVIRAX) 400 MG tablet Take 400 mg by mouth daily  5    aspirin (ECOTRIN LOW STRENGTH) 81 mg EC tablet Take 81 mg by mouth daily        buPROPion (WELLBUTRIN SR) 100 mg 12 hr tablet Take 3 tablets daily in the morning      cholecalciferol (VITAMIN D3) 1,000 units tablet Take 1,000 Units by mouth daily      colesevelam (WELCHOL) 625 mg tablet Take 1,250 mg by mouth 2 (two) times a day as needed       cyanocobalamin (VITAMIN B-12) 1,000 mcg tablet Take by mouth daily      DULoxetine (CYMBALTA) 60 mg delayed release capsule Take 60 mg by mouth daily      ELOTUZUMAB IV Infuse into a venous catheter every 30 (thirty) days      glimepiride (AMARYL) 1 mg tablet Take 1 mg by mouth every morning before breakfast      Janumet  MG per tablet Take 1 tablet by mouth 2 (two) times a day with meals      lenalidomide (REVLIMID) 5 MG CAPS Take 5 mg by mouth daily        losartan (COZAAR) 25 mg tablet Take 25 mg by mouth daily  1    metoprolol tartrate (LOPRESSOR) 25 mg tablet Take 25 mg by mouth every 12 (twelve) hours      nitroglycerin (NITROSTAT) 0 4 mg SL tablet PLACE 1 TABLET UNDER TONGUE EVERY 5 MINS 3 TIMES IF NEEDED FOR CHEST PAIN  0    omeprazole (PriLOSEC) 20 mg delayed release capsule Take 20 mg by mouth daily      rosuvastatin (CRESTOR) 5 mg tablet Take 5 mg by mouth daily      traZODone (DESYREL) 50 mg tablet Take 50 mg by mouth as needed       Vascepa 1 g CAPS Take 2 capsules by mouth 2 (two) times a day      Xarelto 2 5 MG tablet Take 2 5 mg by mouth 2 (two) times a day        ALPRAZolam (XANAX) 1 mg tablet 1 mg 2 (two) times a day as needed (Patient not taking: Reported on 1/21/2022 )  0    docusate sodium (COLACE) 100 mg capsule Take 1 capsule (100 mg total) by mouth 3 (three) times a day for 7 days 90 capsule 0    JANUMET XR  MG TB24 Take 2 tablets by mouth every evening (Patient not taking: Reported on 9/17/2021)  2     No current facility-administered medications for this visit  Allergies  Allergies   Allergen Reactions    Iodinated Diagnostic Agents Anaphylaxis    Other Anaphylaxis    Trilipix [Choline Fenofibrate]      Other reaction(s): jaundice  Other reaction(s): jaundice    Atorvastatin          The following portions of the patient's history were reviewed and updated as appropriate: allergies, current medications, past medical history, past social history, past surgical history and problem list       Vitals  Vitals:    01/21/22 0945   BP: 132/70   BP Location: Left arm   Patient Position: Sitting   Cuff Size: Large   Resp: 20   Weight: 93 4 kg (206 lb)   Height: 5' 9" (1 753 m)           Physical Exam  Physical Exam  Constitutional:       Appearance: Normal appearance  HENT:      Head: Normocephalic and atraumatic  Right Ear: External ear normal       Left Ear: External ear normal    Eyes:      General: No scleral icterus  Conjunctiva/sclera: Conjunctivae normal    Cardiovascular:      Pulses: Normal pulses  Pulmonary:      Effort: Pulmonary effort is normal    Genitourinary:     Comments: Prostate approximately 40 grams without nodules or tenderness  Musculoskeletal:         General: Normal range of motion  Cervical back: Normal range of motion  Skin:     General: Skin is warm and dry  Neurological:      General: No focal deficit present  Mental Status: He is alert and oriented to person, place, and time  Psychiatric:         Mood and Affect: Mood normal          Behavior: Behavior normal          Thought Content:  Thought content normal          Judgment: Judgment normal            Results  Recent Results (from the past 1 hour(s))   POCT Measure PVR    Collection Time: 01/21/22  9:54 AM   Result Value Ref Range    POST-VOID RESIDUAL VOLUME, ML  mL   ]  Lab Results   Component Value Date    PSA 0 4 01/14/2021    PSA 0 4 11/12/2019     Lab Results   Component Value Date    CALCIUM 9 5 12/06/2021    K 4 9 12/06/2021    CO2 26 12/06/2021     12/06/2021    BUN 17 12/06/2021    CREATININE 1 45 (H) 12/06/2021     Lab Results   Component Value Date    WBC 4 51 08/19/2021    HGB 9 7 (L) 08/19/2021    HCT 30 6 (L) 08/19/2021    MCV 74 (L) 08/19/2021    PLT 78 (L) 08/19/2021           Orders  Orders Placed This Encounter   Procedures    POCT Measure PVR       Charlee Htichcock PA-C

## 2022-01-21 ENCOUNTER — OFFICE VISIT (OUTPATIENT)
Dept: UROLOGY | Facility: CLINIC | Age: 67
End: 2022-01-21
Payer: MEDICARE

## 2022-01-21 ENCOUNTER — APPOINTMENT (OUTPATIENT)
Dept: LAB | Facility: HOSPITAL | Age: 67
End: 2022-01-21
Payer: MEDICARE

## 2022-01-21 VITALS
RESPIRATION RATE: 20 BRPM | WEIGHT: 206 LBS | SYSTOLIC BLOOD PRESSURE: 132 MMHG | HEIGHT: 69 IN | DIASTOLIC BLOOD PRESSURE: 70 MMHG | BODY MASS INDEX: 30.51 KG/M2

## 2022-01-21 DIAGNOSIS — R13.12 OROPHARYNGEAL DYSPHAGIA: ICD-10-CM

## 2022-01-21 DIAGNOSIS — N40.0 BENIGN PROSTATIC HYPERPLASIA, UNSPECIFIED WHETHER LOWER URINARY TRACT SYMPTOMS PRESENT: Primary | ICD-10-CM

## 2022-01-21 DIAGNOSIS — I25.10 DISEASE OF CARDIOVASCULAR SYSTEM: ICD-10-CM

## 2022-01-21 DIAGNOSIS — Z12.5 SCREENING FOR PROSTATE CANCER: ICD-10-CM

## 2022-01-21 DIAGNOSIS — E78.2 MIXED HYPERLIPIDEMIA: ICD-10-CM

## 2022-01-21 DIAGNOSIS — I10 ESSENTIAL HYPERTENSION, MALIGNANT: ICD-10-CM

## 2022-01-21 LAB
ALT SERPL W P-5'-P-CCNC: 41 U/L (ref 12–78)
AST SERPL W P-5'-P-CCNC: 15 U/L (ref 5–45)
CHOLEST SERPL-MCNC: 80 MG/DL
HDLC SERPL-MCNC: 31 MG/DL
LDLC SERPL CALC-MCNC: 17 MG/DL (ref 0–100)
NONHDLC SERPL-MCNC: 49 MG/DL
POST-VOID RESIDUAL VOLUME, ML POC: 179 ML
PSA SERPL-MCNC: 0.3 NG/ML (ref 0–4)
TRIGL SERPL-MCNC: 162 MG/DL

## 2022-01-21 PROCEDURE — 51798 US URINE CAPACITY MEASURE: CPT | Performed by: PHYSICIAN ASSISTANT

## 2022-01-21 PROCEDURE — 36415 COLL VENOUS BLD VENIPUNCTURE: CPT

## 2022-01-21 PROCEDURE — 84450 TRANSFERASE (AST) (SGOT): CPT

## 2022-01-21 PROCEDURE — 99214 OFFICE O/P EST MOD 30 MIN: CPT | Performed by: PHYSICIAN ASSISTANT

## 2022-01-21 PROCEDURE — G0103 PSA SCREENING: HCPCS

## 2022-01-21 PROCEDURE — 1124F ACP DISCUSS-NO DSCNMKR DOCD: CPT | Performed by: PHYSICIAN ASSISTANT

## 2022-01-21 PROCEDURE — 80061 LIPID PANEL: CPT

## 2022-01-21 PROCEDURE — 84460 ALANINE AMINO (ALT) (SGPT): CPT

## 2022-01-21 RX ORDER — ALFUZOSIN HYDROCHLORIDE 10 MG/1
10 TABLET, EXTENDED RELEASE ORAL DAILY
Qty: 90 TABLET | Refills: 1 | Status: SHIPPED | OUTPATIENT
Start: 2022-01-21 | End: 2022-07-20

## 2022-01-25 ENCOUNTER — TELEPHONE (OUTPATIENT)
Dept: UROLOGY | Facility: CLINIC | Age: 67
End: 2022-01-25

## 2022-01-25 NOTE — TELEPHONE ENCOUNTER
Patient of Dr Troy Pedro was scheduled for cysto with Dr Star Smallwood on 3/4/22, which is being bumped    Please reschedule appt for patient with Dr Troy Pedro, thanks

## 2022-01-27 NOTE — TELEPHONE ENCOUNTER
Another encounter was created - [facility] wants pt to have UDS - so pt was called from that encounter - closing this one

## 2022-02-14 ENCOUNTER — TELEPHONE (OUTPATIENT)
Dept: UROLOGY | Facility: AMBULATORY SURGERY CENTER | Age: 67
End: 2022-02-14

## 2022-02-14 NOTE — TELEPHONE ENCOUNTER
----- Message from Damian Mcelroy MA sent at 2/14/2022  1:49 PM EST -----  Regarding: FW: Prescription     ----- Message -----  From: Shilpa Gray  Sent: 2/14/2022  12:52 PM EST  To: [facility]  Subject: Prescription                                     Good afternoon     I went to  my prescription for ed and the pharmacist told me they are out of one of the medications  It is on back order and will be a while before they get it in  He suggested trying something else  Please let me know if you have any suggestions   [facility]   Michelle Craft

## 2022-02-15 NOTE — TELEPHONE ENCOUNTER
Attempted to call patient's phone number on file  Phone rang and then stated phone number was not in service  Will send mychart message

## 2022-02-16 ENCOUNTER — TELEPHONE (OUTPATIENT)
Dept: OTHER | Facility: OTHER | Age: 67
End: 2022-02-16

## 2022-02-16 NOTE — TELEPHONE ENCOUNTER
Called and spoke to patient's wife  Informed patient's wife of Radha Taylor PA-C's message "All formulations are with Papaverine in it  I could do a customized mixed without papaverine but I'm not sure how effective this will be"     Patient's wife stated that patient agrees that it might not be effective as well and will wait until the original order is available

## 2022-02-17 ENCOUNTER — NURSE TRIAGE (OUTPATIENT)
Dept: OTHER | Facility: OTHER | Age: 67
End: 2022-02-17

## 2022-02-17 ENCOUNTER — PROCEDURE VISIT (OUTPATIENT)
Dept: UROLOGY | Facility: CLINIC | Age: 67
End: 2022-02-17
Payer: MEDICARE

## 2022-02-17 ENCOUNTER — TELEPHONE (OUTPATIENT)
Dept: UROLOGY | Facility: CLINIC | Age: 67
End: 2022-02-17

## 2022-02-17 DIAGNOSIS — N32.81 DETRUSOR INSTABILITY: ICD-10-CM

## 2022-02-17 DIAGNOSIS — N39.41 URGE INCONTINENCE OF URINE: ICD-10-CM

## 2022-02-17 DIAGNOSIS — R33.9 INCOMPLETE EMPTYING OF BLADDER: Primary | ICD-10-CM

## 2022-02-17 LAB
SL AMB  POCT GLUCOSE, UA: 2
SL AMB LEUKOCYTE ESTERASE,UA: NEGATIVE
SL AMB POCT BILIRUBIN,UA: NEGATIVE
SL AMB POCT BLOOD,UA: ABNORMAL
SL AMB POCT CLARITY,UA: CLEAR
SL AMB POCT COLOR,UA: YELLOW
SL AMB POCT KETONES,UA: NEGATIVE
SL AMB POCT NITRITE,UA: NEGATIVE
SL AMB POCT PH,UA: 5
SL AMB POCT SPECIFIC GRAVITY,UA: 1.03
SL AMB POCT URINE PROTEIN: NEGATIVE
SL AMB POCT UROBILINOGEN: NEGATIVE

## 2022-02-17 PROCEDURE — 51741 ELECTRO-UROFLOWMETRY FIRST: CPT

## 2022-02-17 PROCEDURE — 51728 CYSTOMETROGRAM W/VP: CPT

## 2022-02-17 PROCEDURE — 81002 URINALYSIS NONAUTO W/O SCOPE: CPT

## 2022-02-17 PROCEDURE — 51784 ANAL/URINARY MUSCLE STUDY: CPT

## 2022-02-17 PROCEDURE — 51797 INTRAABDOMINAL PRESSURE TEST: CPT

## 2022-02-17 NOTE — PROGRESS NOTES
CC:" Sometimes the urine just trickles out and  doesn't empty all the way, and I'm getting up 3 times at night again"    Denies pain / burning with voiding    Uroflow:       Voided volume:      189 5  ml       Max flow rate:           9 4 ml/sec       Average flow rate:     3 3 ml/sec       PVR:   125  ml       Patient felt volume voided was more than normal    CMG:       Position:   Standing       Fill sensation:    284 ml,   pdet -7 cm of H2O       First urge:         401 ml,    pdet -15  cm of H2O           Normal urge:     509 ml,    pdet -12  cm of H2O            Must urge:      Never reached         Permission to void:    556 ml,    pdet 18  cm of H2O         Max pdet during void     25 cm H2O       Voided volume:    279 ml       Bladder stability:   unstable at 554 ml with  Leak  and void    Compliance:  normal         EMG activity:       Normal during filling,        normal during voiding    Comments:   Decreased sensation              Never reached must urge     Slow flow   Void attempts at 300, 400, and 500 ml all unsuccessful with no detrusor contraction   + DI at 554 ml, started to leak and then voided 279 ml  Had no urge to void when started to leak    Able to void additional 125 ml in bathroom after catheters removed  Calculated PVR was 146 ml  Urodynamics    Date/Time: 2/17/2022 1:00 PM  Performed by: Sherry Cedillo RN  Authorized by: Ree Pearson MD   Universal Protocol:  Consent: Verbal consent obtained  Written consent obtained    Risks and benefits: risks, benefits and alternatives were discussed  Consent given by: patient  Patient understanding: patient states understanding of the procedure being performed  Patient consent: the patient's understanding of the procedure matches consent given  Procedure consent: procedure consent matches procedure scheduled  Required items: required blood products, implants, devices, and special equipment available  Patient identity confirmed: verbally with patient    Procedure - Urodynamics:  Procedure details: CMG and EMG      Voiding Pressure Study: Yes    Intra-abdominal Voiding Pressure Study: Yes    Post-procedure:     Patient tolerance: Patient tolerated procedure well with no immediate complications

## 2022-02-17 NOTE — TELEPHONE ENCOUNTER
Reason for Disposition   Pharmacy calling with prescription question and triager unable to answer question    Answer Assessment - Initial Assessment Questions  1  NAME of MEDICATION: "What medicine are you calling about?"      Alfuzosin 10 mg   2  QUESTION: "What is your question?" (e g , medication refill, side effect)      Pharmacist from [facility] called, stated that pharmacy doesn't have one of the ingredients for the medication to mix  Pharmacy asked if alternative medication can be ordered    3  PRESCRIBING HCP: "Who prescribed it?" Reason: if prescribed by specialist, call should be referred to that group        Dr Chiquita Faust    Protocols used: MEDICATION QUESTION CALL-ADULT-[place]

## 2022-02-17 NOTE — TELEPHONE ENCOUNTER
Called and spoke to Denair  Explained that the patient is going to hold off on filling the medication until the medication is back in stock

## 2022-02-17 NOTE — TELEPHONE ENCOUNTER
Pt had apt today for UDS  UDS MD discussion apt was not scheduled at the time UDS was scheduled  Please assist with scheduling fu in [facility]

## 2022-02-23 NOTE — TELEPHONE ENCOUNTER
Called and was unable to leave message as this number is not in service  Can offer appointment on 2/24/2022 with Dr Chris Box at 11:30 to review UDS if appointment is still available  Will try again later

## 2022-02-23 NOTE — TELEPHONE ENCOUNTER
Called and spoke to patient's wife Bishop Osborn  Patient currently on vacation  Patient will return on 3/4/2022      Will continue to look for UDS f/u after 3/4/22

## 2022-02-23 NOTE — TELEPHONE ENCOUNTER
Patient is scheduled with Dr Princess Grijalva on 3/16/2022 at 1:30pm in the [facility] office to review UDS  Please call and confirm once they return from vacation  Thank you!

## 2022-02-28 ENCOUNTER — APPOINTMENT (OUTPATIENT)
Dept: LAB | Facility: CLINIC | Age: 67
End: 2022-02-28
Payer: MEDICARE

## 2022-02-28 ENCOUNTER — TELEPHONE (OUTPATIENT)
Dept: OTHER | Facility: OTHER | Age: 67
End: 2022-02-28

## 2022-02-28 DIAGNOSIS — C90.00 MULTIPLE MYELOMA, REMISSION STATUS UNSPECIFIED (HCC): ICD-10-CM

## 2022-02-28 LAB
ALBUMIN SERPL BCP-MCNC: 3.7 G/DL (ref 3.5–5)
ALP SERPL-CCNC: 96 U/L (ref 46–116)
ALT SERPL W P-5'-P-CCNC: 30 U/L (ref 12–78)
ANION GAP SERPL CALCULATED.3IONS-SCNC: 6 MMOL/L (ref 4–13)
AST SERPL W P-5'-P-CCNC: 17 U/L (ref 5–45)
BASOPHILS # BLD AUTO: 0.04 THOUSANDS/ΜL (ref 0–0.1)
BASOPHILS NFR BLD AUTO: 1 % (ref 0–1)
BILIRUB SERPL-MCNC: 0.85 MG/DL (ref 0.2–1)
BUN SERPL-MCNC: 23 MG/DL (ref 5–25)
CALCIUM SERPL-MCNC: 9 MG/DL (ref 8.3–10.1)
CHLORIDE SERPL-SCNC: 109 MMOL/L (ref 100–108)
CO2 SERPL-SCNC: 23 MMOL/L (ref 21–32)
CREAT SERPL-MCNC: 1.47 MG/DL (ref 0.6–1.3)
EOSINOPHIL # BLD AUTO: 0.35 THOUSAND/ΜL (ref 0–0.61)
EOSINOPHIL NFR BLD AUTO: 8 % (ref 0–6)
ERYTHROCYTE [DISTWIDTH] IN BLOOD BY AUTOMATED COUNT: 17.3 % (ref 11.6–15.1)
GFR SERPL CREATININE-BSD FRML MDRD: 49 ML/MIN/1.73SQ M
GLUCOSE P FAST SERPL-MCNC: 138 MG/DL (ref 65–99)
HCT VFR BLD AUTO: 32.4 % (ref 36.5–49.3)
HGB BLD-MCNC: 10.3 G/DL (ref 12–17)
IMM GRANULOCYTES # BLD AUTO: 0.01 THOUSAND/UL (ref 0–0.2)
IMM GRANULOCYTES NFR BLD AUTO: 0 % (ref 0–2)
LYMPHOCYTES # BLD AUTO: 1.12 THOUSANDS/ΜL (ref 0.6–4.47)
LYMPHOCYTES NFR BLD AUTO: 25 % (ref 14–44)
MCH RBC QN AUTO: 23 PG (ref 26.8–34.3)
MCHC RBC AUTO-ENTMCNC: 31.8 G/DL (ref 31.4–37.4)
MCV RBC AUTO: 72 FL (ref 82–98)
MONOCYTES # BLD AUTO: 0.46 THOUSAND/ΜL (ref 0.17–1.22)
MONOCYTES NFR BLD AUTO: 10 % (ref 4–12)
NEUTROPHILS # BLD AUTO: 2.44 THOUSANDS/ΜL (ref 1.85–7.62)
NEUTS SEG NFR BLD AUTO: 56 % (ref 43–75)
NRBC BLD AUTO-RTO: 0 /100 WBCS
PLATELET # BLD AUTO: 48 THOUSANDS/UL (ref 149–390)
POTASSIUM SERPL-SCNC: 4.6 MMOL/L (ref 3.5–5.3)
PROT SERPL-MCNC: 6.6 G/DL (ref 6.4–8.2)
RBC # BLD AUTO: 4.48 MILLION/UL (ref 3.88–5.62)
SODIUM SERPL-SCNC: 138 MMOL/L (ref 136–145)
WBC # BLD AUTO: 4.42 THOUSAND/UL (ref 4.31–10.16)

## 2022-02-28 PROCEDURE — 85025 COMPLETE CBC W/AUTO DIFF WBC: CPT

## 2022-02-28 PROCEDURE — 36415 COLL VENOUS BLD VENIPUNCTURE: CPT

## 2022-02-28 PROCEDURE — 80053 COMPREHEN METABOLIC PANEL: CPT

## 2022-03-01 NOTE — TELEPHONE ENCOUNTER
Lab Result: Platelet 48   Date/Time Drawn: 02/28/2022  1122   Ordering Provider: [id number] [place] Name: Alice Fan       The following critical/stat result was read back to the lab as stated above and [employer] to the on-call provider

## 2022-03-01 NOTE — TELEPHONE ENCOUNTER
This is not a [facility] heme onc patient  Tried to call patient, phone out of service  Left message on wife cell stating platelets [phone]  They are advised to contact patient's heme onc doctor, who appears may be at [facility]? I then called the lab and advised them to call the ordering provider, who is not a [facility] provider  They will call ordering provider

## 2022-03-02 ENCOUNTER — TELEPHONE (OUTPATIENT)
Dept: UROLOGY | Facility: CLINIC | Age: 67
End: 2022-03-02

## 2022-03-02 NOTE — TELEPHONE ENCOUNTER
Pt sent a [employer] message , if any cancellations occur for [facility] prior to 03/16/22 he would like to be moved up

## 2022-03-15 PROBLEM — N31.2: Status: ACTIVE | Noted: 2022-03-15

## 2022-03-15 PROBLEM — N32.81 DETRUSOR INSTABILITY: Status: ACTIVE | Noted: 2022-03-15

## 2022-03-15 NOTE — PROGRESS NOTES
Problem List Items Addressed This Visit        Genitourinary    Urinary retention due to benign prostatic hyperplasia - Primary    Relevant Orders    US kidney and bladder with pvr    Basic metabolic panel    Detrusor areflexia    Detrusor instability       Other    Encounter to discuss test results            Discussion:    Camacho James and his wife and I had a productive consultation today  We reviewed his surgery which showed a good channel through the prostate  Initially he did better  He is complaining of a slow stream at this time and sensation of incomplete emptying as well as getting up 4-5 times per night  I reviewed with him his complex cyst to me to gram which shows detrusor areflexia with some detrusor instability  He never reached a must urge and when he was being filled he went into overflow incontinence  I reviewed with him the Crede maneuver and behavioral means of emptying his bladder given his detrusor areflexia  I have sent a message to my colleague who performs InterStim to see whether or not the patient might be a candidate for InterStim placement for his detrusor a flexi a  In the meantime he wishes to remain conservative in terms of ongoing treatments  He will see me every 6 months with a basic metabolic panel and renal function testing  I think that his elevated creatinine is due to nephropathy from longstanding diabetes  This is also likely why he has had trouble voiding and has detrusor areflexia  I reviewed with him continent catheterizable channel construction with a reconstructive urologist as well as clean intermittent catheterization, urethral Yarbrough catheter, and suprapubic catheter placement    He wishes to avoid all of these at the current time    Today's visit including catheter ring of and interpretation of data and counseling and coordination of care along with evaluation and management is greater than 60 minutes and represents higher order medical decision making    Assessment and plan:       Please see problem oriented charting for the assessment plan of today's urological complaints    Ryder Perez MD      Chief Complaint     As above      History of Present Illness     Maya Jones is a 61 y o  gentleman with a history of trouble urinating  He is status post transurethral vaporization of the prostate in August 2021  Initially did well  Unfortunately now with worsening trouble urinating    CC:" Sometimes the urine just trickles out and  doesn't empty all the way, and I'm getting up 3 times at night again"     Denies pain / burning with voiding     Uroflow:       Voided volume:      189 5  ml       Max flow rate:           9 4 ml/sec       Average flow rate:     3 3 ml/sec       PVR:   125  ml       Patient felt volume voided was more than normal     CMG:       Position:   Standing       Fill sensation:    284 ml,   pdet -7 cm of H2O       First urge:         401 ml,    pdet -15  cm of H2O           Normal urge:     509 ml,    pdet -12  cm of H2O            Must urge:      Never reached         Permission to void:    556 ml,    pdet 18  cm of H2O         Max pdet during void     25 cm H2O       Voided volume:    279 ml       Bladder stability:   unstable at 554 ml with  Leak  and void     Compliance:  normal         EMG activity:       Normal during filling,        normal during voiding     Comments:              Decreased sensation              Never reached must urge                Slow flow              Void attempts at 300, 400, and 500 ml all unsuccessful with no detrusor contraction              + DI at 554 ml, started to leak and then voided 279 ml  Had no urge to void when started to leak               Able to void additional 125 ml in bathroom after catheters removed  Calculated PVR was 146 ml  Detailed Urologic History     - please refer to HPI    Review of Systems     Review of Systems   Constitutional: Negative  HENT: Negative  Eyes: Negative  Respiratory: Negative  Cardiovascular: Negative  Gastrointestinal: Negative  Endocrine: Negative  Genitourinary: Positive for difficulty urinating  And as per HPI   Musculoskeletal: Negative  Skin: Negative  Allergic/Immunologic: Negative  Neurological: Negative  Hematological: Negative  Psychiatric/Behavioral: Negative  AUA SYMPTOM SCORE      Most Recent Value   AUA SYMPTOM SCORE    How often have you had a sensation of not emptying your bladder completely after you finished urinating? 1 (P)     How often have you had to urinate again less than two hours after you finished urinating? 3 (P)     How often have you found you stopped and started again several times when you urinate? 2 (P)     How often have you found it difficult to postpone urination? 1 (P)     How often have you had a weak urinary stream? 3 (P)     How often have you had to push or strain to begin urination? 2 (P)     How many times did you most typically get up to urinate from the time you went to bed at night until the time you got up in the morning? 3 (P)     Quality of Life: If you were to spend the rest of your life with your urinary condition just the way it is now, how would you feel about that? 4 (P)     AUA SYMPTOM SCORE 15 (P)             Allergies     Allergies   Allergen Reactions    Iodinated Diagnostic Agents Anaphylaxis    Other Anaphylaxis    Trilipix [Choline Fenofibrate]      Other reaction(s): jaundice  Other reaction(s): jaundice    Atorvastatin        Physical Exam     Physical Exam  Vitals reviewed  Constitutional:       General: He is not in acute distress  Appearance: Normal appearance  He is not ill-appearing, toxic-appearing or diaphoretic  HENT:      Head: Normocephalic and atraumatic  Eyes:      General: No scleral icterus  Right eye: No discharge  Left eye: No discharge  Cardiovascular:      Pulses: Normal pulses     Pulmonary: Effort: Pulmonary effort is normal    Abdominal:      General: There is no distension  Palpations: There is no mass  Musculoskeletal:         General: No swelling  Skin:     General: Skin is warm  Neurological:      General: No focal deficit present  Mental Status: He is alert and oriented to person, place, and time  Cranial Nerves: No cranial nerve deficit  Psychiatric:         Mood and Affect: Mood normal          Behavior: Behavior normal          Thought Content:  Thought content normal          Judgment: Judgment normal              Vital Signs  Vitals:    03/16/22 1438   BP: 132/76   Pulse: 68   Resp: 16   SpO2: 96%   Weight: 91 6 kg (202 lb)   Height: 5' 9" (1 753 m)         Current Medications       Current Outpatient Medications:     acyclovir (ZOVIRAX) 400 MG tablet, Take 400 mg by mouth daily, Disp: , Rfl: 5    alfuzosin (UROXATRAL) 10 mg 24 hr tablet, Take 1 tablet (10 mg total) by mouth daily, Disp: 90 tablet, Rfl: 1    aspirin (ECOTRIN LOW STRENGTH) 81 mg EC tablet, Take 81 mg by mouth daily  , Disp: , Rfl:     BD Syringe Slip Tip 26G X 3/8" 1 ML MISC, As directed, Disp: , Rfl:     buPROPion (WELLBUTRIN SR) 100 mg 12 hr tablet, Take 3 tablets daily in the morning, Disp: , Rfl:     cholecalciferol (VITAMIN D3) 1,000 units tablet, Take 1,000 Units by mouth daily, Disp: , Rfl:     colesevelam (WELCHOL) 625 mg tablet, Take 1,250 mg by mouth 2 (two) times a day as needed , Disp: , Rfl:     cyanocobalamin (VITAMIN B-12) 1,000 mcg tablet, Take by mouth daily, Disp: , Rfl:     DULoxetine (CYMBALTA) 60 mg delayed release capsule, Take 60 mg by mouth daily, Disp: , Rfl:     ELOTUZUMAB IV, Infuse into a venous catheter every 30 (thirty) days, Disp: , Rfl:     glimepiride (AMARYL) 1 mg tablet, Take 1 mg by mouth every morning before breakfast, Disp: , Rfl:     Janumet  MG per tablet, Take 1 tablet by mouth 2 (two) times a day with meals, Disp: , Rfl:     lenalidomide (REVLIMID) 5 MG CAPS, Take 5 mg by mouth daily  , Disp: , Rfl:     losartan (COZAAR) 50 mg tablet, Take 50 mg by mouth daily, Disp: , Rfl:     metoprolol tartrate (LOPRESSOR) 25 mg tablet, Take 25 mg by mouth every 12 (twelve) hours, Disp: , Rfl:     nitroglycerin (NITROSTAT) 0 4 mg SL tablet, PLACE 1 TABLET UNDER TONGUE EVERY 5 MINS 3 TIMES IF NEEDED FOR CHEST PAIN, Disp: , Rfl: 0    rosuvastatin (CRESTOR) 5 mg tablet, Take 5 mg by mouth daily, Disp: , Rfl:     traZODone (DESYREL) 50 mg tablet, Take 50 mg by mouth as needed , Disp: , Rfl:     Vascepa 1 g CAPS, Take 2 capsules by mouth 2 (two) times a day, Disp: , Rfl:     Xarelto 2 5 MG tablet, Take 2 5 mg by mouth 2 (two) times a day  , Disp: , Rfl:       Active Problems     Patient Active Problem List   Diagnosis    ED (erectile dysfunction)    Screening for colon cancer    Essential hypertension    [facility]    Recurrent major depressive disorder, in full remission ([facility]    Type 2 diabetes mellitus with kidney complication, without long-term current use of insulin ([facility]    Neuropathy    Hyperlipidemia    Oropharyngeal dysphagia    Urinary retention due to benign prostatic hyperplasia    Encounter to discuss test results    Thrombocytopenia [facility]    Other ejaculatory dysfunction    Stage 3a chronic kidney disease ([address]    CAD (coronary artery disease)    Microcytic anemia    Serum total bilirubin elevated    Pneumonia of left lower lobe due to infectious organism    Detrusor areflexia    Detrusor instability         Past Medical History     Past Medical History:   Diagnosis Date    Back problem     Diabetes [facility]     Diabetes mellitus ([facility]     Heartburn     History of depression     History of transfusion     Hyperlipidemia     Hypertension     Multiple myeloma ([address]     Myocardial infarction ([address]     Pneumonia          Surgical History     Past Surgical History:   Procedure Laterality Date    CARDIAC CATHETERIZATION      HERNIA REPAIR      OK COLONOSCOPY FLX DX W/COLLJ SPEC WHEN PFRMD N/A 11/16/2018    Procedure: COLONOSCOPY;  Surgeon: Aure Raymond MD;  Location: [facility]; Service: Gastroenterology    OK TRANSURETHRAL ELEC-SURG PROSTATECTOM N/A 8/19/2021    Procedure: TRANSURETHRAL VAPORIZATION OF PROSTATE (TURP);   Surgeon: Brad Anderson MD;  Location: [facility];  Service: Urology    TONSILLECTOMY      TONSILLECTOMY           Family History     Family History   Problem Relation Age of Onset    Heart disease Father     Diabetes Brother          Social History     Social History     Social History     Tobacco Use   Smoking Status Never Smoker   Smokeless Tobacco Never Used         Pertinent Lab Values     Lab Results   Component Value Date    CREATININE 1 47 (H) 02/28/2022       Lab Results   Component Value Date    PSA 0 3 01/21/2022    PSA 0 4 01/14/2021    PSA 0 4 11/12/2019             Pertinent Imaging      Review of complex cystometrogram

## 2022-03-15 NOTE — PATIENT INSTRUCTIONS
Urinary Retention in Men   WHAT YOU NEED TO KNOW:   What is urinary retention? Urinary retention is a condition that develops when your bladder does not empty completely when you urinate  What causes urinary retention? · An enlarged prostate    · Blockages, such as a stone, growth, or narrowing of your urethra    · A weak bladder muscle    · Nerve damage from diabetes, stroke, or spinal cord injury    · Bladder diverticula, which are pockets of urine that form in your bladder and do not empty    · Certain medicines, such as narcotics, antihistamines, or antidepressants    What are the signs and symptoms of urinary retention? · Frequent urination, or the urge to urinate right after you finish    · An urge to urinate, but your urine does not come out or dribbles out slowly and weakly    · Frequent urine leaks that happen during the day or while you sleep    · Pain or pressure when you urinate    · Pain or stiffness in your abdomen, lower back, hips, or upper thighs    · Blood in your urine    How is urinary retention diagnosed? Your healthcare provider will ask about your health history and the medicines you take  He will press or tap on your lower abdomen  You may need any of the following tests:  · A digital rectal exam  is when healthcare providers carefully feel the size of your prostate  · A post void residual  test will show how much urine is left in your bladder after you urinate  You will be asked to urinate and then healthcare providers will use a small ultrasound machine to check how much urine is left in your bladder  · Blood or urine tests  may show infection or prostate specific antigen (PSA) levels  PSA may be elevated in prostate cancer  · An ultrasound  uses sound waves to show pictures on a monitor  An ultrasound may be done to show bladder stones, infection, or other problems  · A CT scan , or CAT scan, is a type of x-ray that is taken of your prostate, kidneys, and bladder  The pictures may show what is causing your urinary retention  You may be given a dye before the pictures are taken to help healthcare providers see the pictures better  Tell the healthcare provider if you have ever had an allergic reaction to contrast dye  How is urinary retention treated? · A Yarbrough catheter  is a tube put into your bladder to drain urine into a bag  Keep the bag below your waist  This will prevent urine from flowing back into your bladder and causing an infection or other problems  Also, keep the tube free of kinks so the urine will drain properly  Do not pull on the catheter  This can cause pain and bleeding, and may cause the catheter to come out  · Medicines  can help decrease the size of your prostate, fight infection, and help you urinate more easily  · Surgery  may be needed to treat the condition that is causing your urinary retention  When should I contact my healthcare provider? · You have a fever  · You have pain when you urinate  · You have blood in your urine  · You have problems with your catheter  · You have questions or concerns about your condition or care  When should I seek immediate care or call 911? · You have severe abdominal pain  · You are breathing faster than usual     · Your heartbeat is faster than usual     · Your face, hands, feet, or ankles are swollen  CARE AGREEMENT:   You have the right to help plan your care  Learn about your health condition and how it may be treated  Discuss treatment options with your healthcare providers to decide what care you want to receive  You always have the right to refuse treatment  The above information is an  only  It is not intended as medical advice for individual conditions or treatments  Talk to your doctor, nurse or pharmacist before following any medical regimen to see if it is safe and effective for you    © Copyright [employer] 2022 Information is for End User's use only and may not be sold, redistributed or otherwise used for commercial purposes   All illustrations and images included in CareNotes® are the copyrighted property of [employer]  or Sacha Garrido

## 2022-03-16 ENCOUNTER — OFFICE VISIT (OUTPATIENT)
Dept: UROLOGY | Facility: CLINIC | Age: 67
End: 2022-03-16
Payer: MEDICARE

## 2022-03-16 VITALS
BODY MASS INDEX: 29.92 KG/M2 | HEIGHT: 69 IN | SYSTOLIC BLOOD PRESSURE: 132 MMHG | OXYGEN SATURATION: 96 % | RESPIRATION RATE: 16 BRPM | WEIGHT: 202 LBS | HEART RATE: 68 BPM | DIASTOLIC BLOOD PRESSURE: 76 MMHG

## 2022-03-16 DIAGNOSIS — N32.81 DETRUSOR INSTABILITY: ICD-10-CM

## 2022-03-16 DIAGNOSIS — N40.1 URINARY RETENTION DUE TO BENIGN PROSTATIC HYPERPLASIA: Primary | ICD-10-CM

## 2022-03-16 DIAGNOSIS — Z71.2 ENCOUNTER TO DISCUSS TEST RESULTS: ICD-10-CM

## 2022-03-16 DIAGNOSIS — N31.2 DETRUSOR AREFLEXIA: ICD-10-CM

## 2022-03-16 DIAGNOSIS — R33.8 URINARY RETENTION DUE TO BENIGN PROSTATIC HYPERPLASIA: Primary | ICD-10-CM

## 2022-03-16 PROCEDURE — 99215 OFFICE O/P EST HI 40 MIN: CPT | Performed by: UROLOGY

## 2022-03-16 RX ORDER — LOSARTAN POTASSIUM 50 MG/1
50 TABLET ORAL DAILY
COMMUNITY
Start: 2022-02-12

## 2022-03-16 RX ORDER — SYRINGE, DISPOSABLE, 1 ML
SYRINGE, EMPTY DISPOSABLE MISCELLANEOUS
COMMUNITY
Start: 2022-02-09

## 2022-03-17 ENCOUNTER — TELEPHONE (OUTPATIENT)
Dept: UROLOGY | Facility: CLINIC | Age: 67
End: 2022-03-17

## 2022-03-17 NOTE — TELEPHONE ENCOUNTER
I attempted to call the patient to discuss Dr Flavio Ansari recommendation with regard to potential InterStim placement  It sounds like the chances of success are slim to medium      Linda Vela did not answer, should he be interested in this I would refer him to Dr Laureano Hale for a test stimulation in the operating room

## 2022-03-28 ENCOUNTER — APPOINTMENT (OUTPATIENT)
Dept: LAB | Facility: CLINIC | Age: 67
End: 2022-03-28
Payer: MEDICARE

## 2022-03-28 DIAGNOSIS — N17.8 ACUTE RENAL FAILURE WITH PATHOLOGICAL LESION IN KIDNEY (HCC): ICD-10-CM

## 2022-03-28 DIAGNOSIS — R80.8 OTHER PROTEINURIA: ICD-10-CM

## 2022-03-28 DIAGNOSIS — E11.22 TYPE 2 DIABETES MELLITUS WITH ESRD (END-STAGE RENAL DISEASE) (HCC): ICD-10-CM

## 2022-03-28 DIAGNOSIS — N18.30 STAGE 3 CHRONIC KIDNEY DISEASE, UNSPECIFIED WHETHER STAGE 3A OR 3B CKD (HCC): ICD-10-CM

## 2022-03-28 DIAGNOSIS — N18.6 TYPE 2 DIABETES MELLITUS WITH ESRD (END-STAGE RENAL DISEASE) (HCC): ICD-10-CM

## 2022-03-28 LAB
ALBUMIN SERPL BCP-MCNC: 3.5 G/DL (ref 3.5–5)
ANION GAP SERPL CALCULATED.3IONS-SCNC: 6 MMOL/L (ref 4–13)
BUN SERPL-MCNC: 18 MG/DL (ref 5–25)
CALCIUM SERPL-MCNC: 9.2 MG/DL (ref 8.3–10.1)
CHLORIDE SERPL-SCNC: 104 MMOL/L (ref 100–108)
CO2 SERPL-SCNC: 27 MMOL/L (ref 21–32)
CREAT SERPL-MCNC: 1.52 MG/DL (ref 0.6–1.3)
GFR SERPL CREATININE-BSD FRML MDRD: 47 ML/MIN/1.73SQ M
GLUCOSE P FAST SERPL-MCNC: 151 MG/DL (ref 65–99)
PHOSPHATE SERPL-MCNC: 3.2 MG/DL (ref 2.3–4.1)
POTASSIUM SERPL-SCNC: 4.5 MMOL/L (ref 3.5–5.3)
SODIUM SERPL-SCNC: 137 MMOL/L (ref 136–145)

## 2022-03-28 PROCEDURE — 36415 COLL VENOUS BLD VENIPUNCTURE: CPT

## 2022-03-28 PROCEDURE — 80069 RENAL FUNCTION PANEL: CPT

## 2022-05-10 ENCOUNTER — TELEPHONE (OUTPATIENT)
Dept: INTERNAL MEDICINE CLINIC | Facility: CLINIC | Age: 67
End: 2022-05-10

## 2022-05-10 NOTE — TELEPHONE ENCOUNTER
Received fax for medical records to be sent to [address] [place] [address]   Faxed on 5-10-22 to [facility] phone [phone] English

## 2022-10-12 PROBLEM — J18.9 PNEUMONIA OF LEFT LOWER LOBE DUE TO INFECTIOUS ORGANISM: Status: RESOLVED | Noted: 2021-06-09 | Resolved: 2022-10-12

## 2022-10-17 NOTE — TELEPHONE ENCOUNTER
Patient's wife called stating patient had labs and xray done for surgery and would like to know the results      Patient can be reached at [phone] patient
